# Patient Record
Sex: MALE | Race: WHITE | NOT HISPANIC OR LATINO | Employment: FULL TIME | RURAL
[De-identification: names, ages, dates, MRNs, and addresses within clinical notes are randomized per-mention and may not be internally consistent; named-entity substitution may affect disease eponyms.]

---

## 2017-08-09 ENCOUNTER — OFFICE VISIT (OUTPATIENT)
Dept: FAMILY MEDICINE CLINIC | Facility: CLINIC | Age: 27
End: 2017-08-09

## 2017-08-09 VITALS
SYSTOLIC BLOOD PRESSURE: 122 MMHG | TEMPERATURE: 97.5 F | HEART RATE: 74 BPM | HEIGHT: 71 IN | DIASTOLIC BLOOD PRESSURE: 82 MMHG | WEIGHT: 242 LBS | BODY MASS INDEX: 33.88 KG/M2

## 2017-08-09 DIAGNOSIS — H60.331 ACUTE SWIMMER'S EAR OF RIGHT SIDE: Primary | ICD-10-CM

## 2017-08-09 PROCEDURE — 99213 OFFICE O/P EST LOW 20 MIN: CPT | Performed by: NURSE PRACTITIONER

## 2017-08-09 NOTE — PROGRESS NOTES
"Subjective   Rupert Bean is a 27 y.o. male.   Chief Complaint   Patient presents with   • Earache     Earache    There is pain in the right ear. This is a new problem. The current episode started in the past 7 days. The problem has been waxing and waning. There has been no fever. Pertinent negatives include no abdominal pain, coughing, diarrhea, ear discharge, headaches, neck pain, rash, rhinorrhea, sore throat or vomiting. He has tried ear drops for the symptoms. The treatment provided no relief.        The following portions of the patient's history were reviewed and updated as appropriate: allergies, current medications, past family history, past medical history, past social history, past surgical history and problem list.  /82  Pulse 74  Temp 97.5 °F (36.4 °C) (Oral)   Ht 71\" (180.3 cm)  Wt 242 lb (110 kg)  BMI 33.75 kg/m2  Review of Systems   Constitutional: Negative for chills, fatigue and fever.   HENT: Positive for ear pain. Negative for congestion, ear discharge, rhinorrhea and sore throat.    Respiratory: Negative for cough, shortness of breath and wheezing.    Cardiovascular: Negative for chest pain, palpitations and leg swelling.   Gastrointestinal: Negative for abdominal pain, diarrhea, nausea and vomiting.   Genitourinary: Negative for dysuria.   Musculoskeletal: Negative for back pain, myalgias and neck pain.   Skin: Negative for rash and wound.   Neurological: Negative for dizziness, light-headedness and headaches.   Psychiatric/Behavioral: Negative for sleep disturbance. The patient is not nervous/anxious.        Objective   Physical Exam   Constitutional: He is oriented to person, place, and time. He appears well-developed and well-nourished.   HENT:   Head: Normocephalic and atraumatic.   Left Ear: External ear normal.   Right canal erythematous, edematous, TM NL   Cardiovascular: Normal rate, regular rhythm and normal heart sounds.    Pulmonary/Chest: Effort normal and breath " sounds normal.   Abdominal: Soft. Bowel sounds are normal.   Musculoskeletal: Normal range of motion.   Neurological: He is alert and oriented to person, place, and time.   Skin: Skin is warm and dry.   Psychiatric: He has a normal mood and affect. His behavior is normal.       Assessment/Plan   Rupert was seen today for earache.    Diagnoses and all orders for this visit:    Acute swimmer's ear of right side  -     neomycin-polymyxin-hydrocortisone (CORTISPORIN) 3.5-70856-0 otic solution; Administer 3 drops to the right ear 3 (Three) Times a Day.      Findings consistent with Otitis Externa. Will treat with topical antibiotic. Administration and SE discussed.  OTherwise keep the ear clean and dry. Follow up in 2-3 days if no improvement or if symptoms worsen.

## 2018-04-05 ENCOUNTER — OFFICE VISIT (OUTPATIENT)
Dept: FAMILY MEDICINE CLINIC | Facility: CLINIC | Age: 28
End: 2018-04-05

## 2018-04-05 VITALS
BODY MASS INDEX: 34.64 KG/M2 | HEART RATE: 78 BPM | WEIGHT: 247.4 LBS | TEMPERATURE: 97.8 F | HEIGHT: 71 IN | DIASTOLIC BLOOD PRESSURE: 67 MMHG | SYSTOLIC BLOOD PRESSURE: 123 MMHG

## 2018-04-05 DIAGNOSIS — N50.819 ORCHALGIA: Primary | ICD-10-CM

## 2018-04-05 PROCEDURE — 99213 OFFICE O/P EST LOW 20 MIN: CPT | Performed by: FAMILY MEDICINE

## 2018-04-05 NOTE — PROGRESS NOTES
"Subjective     Chief Complaint   Patient presents with   • Discuss male issues       Rupert Bean is a 27 y.o. male.     History of Present Illness a few weeks of episodic pelvic discomfort with perception of left testicle being smaller maybe a little tender.  Has had no urinary flow symptoms.  No bowel pattern symptoms.  No recent heavy lifting.  Interestingly did have an episode of scrotal trauma a few years ago.    The following portions of the patient's history were reviewed and updated as appropriate: allergies, past family history, past medical history, past social history, past surgical history and problem list.    Review of Systems see the history of present illness    Objective   Physical Exam   Constitutional: He is oriented to person, place, and time. He appears well-developed and well-nourished.   Abdominal: Soft. There is no tenderness. There is no guarding. Hernia confirmed negative in the right inguinal area and confirmed negative in the left inguinal area.   Genitourinary: Penis normal. Right testis shows no mass, no swelling and no tenderness. Left testis shows tenderness. Left testis shows no mass.   Genitourinary Comments: Left testicle is smaller than right testicle.  Less firm in consistency.   Lymphadenopathy: No inguinal adenopathy noted on the right or left side.   Neurological: He is alert and oriented to person, place, and time.   Psychiatric: He has a normal mood and affect.     /67 (BP Location: Left arm, Patient Position: Sitting, Cuff Size: Adult)   Pulse 78   Temp 97.8 °F (36.6 °C) (Oral)   Ht 180.3 cm (70.98\")   Wt 112 kg (247 lb 6.4 oz)   BMI 34.52 kg/m²   Assessment/Plan   Rupert was seen today for discuss male issues.    Diagnoses and all orders for this visit:    Orchalgia     I think you deserve a scrotal ultrasound.  Use OTC analgesia as desired.  You agree but cost is a factor.  Will try to sort out financial considerations and make you aware.  Follow-up " pending.

## 2018-04-06 DIAGNOSIS — N50.819 ORCHALGIA: Primary | ICD-10-CM

## 2018-04-24 ENCOUNTER — HOSPITAL ENCOUNTER (OUTPATIENT)
Dept: ULTRASOUND IMAGING | Facility: HOSPITAL | Age: 28
Discharge: HOME | End: 2018-04-24
Admitting: FAMILY MEDICINE

## 2018-04-24 DIAGNOSIS — N50.819 ORCHALGIA: ICD-10-CM

## 2018-04-24 PROCEDURE — 76870 US EXAM SCROTUM: CPT | Performed by: RADIOLOGY

## 2018-04-24 PROCEDURE — 76870 US EXAM SCROTUM: CPT

## 2018-04-26 NOTE — PROGRESS NOTES
Let him know that the ultrasound was unremarkable.  Did not show problems.  If he continues to have symptoms would recommend referral to urologist for check.

## 2018-09-14 ENCOUNTER — OFFICE VISIT (OUTPATIENT)
Dept: UROLOGY | Facility: CLINIC | Age: 28
End: 2018-09-14

## 2018-09-14 VITALS — HEIGHT: 71 IN | BODY MASS INDEX: 33.6 KG/M2 | WEIGHT: 240 LBS

## 2018-09-14 DIAGNOSIS — N50.819 TESTALGIA: ICD-10-CM

## 2018-09-14 DIAGNOSIS — N20.0 RENAL CALCULUS: Primary | ICD-10-CM

## 2018-09-14 PROCEDURE — 99204 OFFICE O/P NEW MOD 45 MIN: CPT | Performed by: UROLOGY

## 2018-09-14 NOTE — PROGRESS NOTES
Chief Complaint:          Chief Complaint   Patient presents with   • Testicle Pain       HPI:   28 y.o. male.  28-year-old white male is referred for evaluation of testicular pain left greater than right.  He had a normal testicular ultrasound which I personally reviewed.  It has  been off and on for 2 years.  He has pain in the groin.  He has normal erections and ejaculations.  He has no allergies, he's had wisdom teeth in his legs fixed from a fracture, he has a sit down job.  He has no other complaints or problems just a slight disparity of size of left smaller than right.  He's never had an upper tract study he's never had a nerve injury he has normal bowel movements he's been to urologist years ago and was diagnosed with BPH.  He has no stones is no history of significant trauma no history of infections and no sexually-transmitted diseases that he knows of.    Past Medical History:        Past Medical History:   Diagnosis Date   • Broken leg          Current Meds:     Current Outpatient Prescriptions   Medication Sig Dispense Refill   • Aspirin-Acetaminophen-Caffeine (EXCEDRIN PO) Take  by mouth.       No current facility-administered medications for this visit.         Allergies:      No Known Allergies     Past Surgical History:     Past Surgical History:   Procedure Laterality Date   • FRACTURE SURGERY           Social History:     Social History     Social History   • Marital status: Single     Spouse name: N/A   • Number of children: N/A   • Years of education: N/A     Occupational History   • Not on file.     Social History Main Topics   • Smoking status: Never Smoker   • Smokeless tobacco: Never Used   • Alcohol use 0.6 oz/week     1 Cans of beer per week   • Drug use: No   • Sexual activity: Not on file     Other Topics Concern   • Not on file     Social History Narrative   • No narrative on file       Family History:     Family History   Problem Relation Age of Onset   • Hypertension Mother    •  Anuerysm Father    • Diabetes Maternal Grandfather    • Heart disease Maternal Grandfather        Review of Systems:     Review of Systems   Constitutional: Negative.    HENT: Negative.    Eyes: Negative.    Respiratory: Negative.    Cardiovascular: Negative.    Gastrointestinal: Negative.    Endocrine: Negative.    Genitourinary: Positive for testicular pain.   Musculoskeletal: Negative.    Allergic/Immunologic: Negative.    Neurological: Negative.    Hematological: Negative.    Psychiatric/Behavioral: Negative.        Physical Exam:     Physical Exam   Constitutional: He is oriented to person, place, and time. He appears well-developed and well-nourished.   HENT:   Head: Normocephalic and atraumatic.   Eyes: Pupils are equal, round, and reactive to light. Conjunctivae and EOM are normal.   Neck: Normal range of motion.   Cardiovascular: Normal rate, regular rhythm, normal heart sounds and intact distal pulses.    Pulmonary/Chest: Effort normal and breath sounds normal.   Abdominal: Soft. Bowel sounds are normal.   Genitourinary: Penis normal.   Genitourinary Comments: Normal uncircumcised phallus normal testes left smaller than right no varicocele no epididymal cysts or masses.  Normal testis parenchyma   Musculoskeletal: Normal range of motion.   Neurological: He is alert and oriented to person, place, and time. He has normal reflexes.   Skin: Skin is warm and dry.   Psychiatric: He has a normal mood and affect. His behavior is normal. Judgment and thought content normal.   Nursing note and vitals reviewed.      I have reviewed the following portions of the patient's history: allergies, current medications, past family history, past medical history, past social history, past surgical history, problem list and ROS and confirm it's accurate.      Procedure:       Assessment/Plan:   Testalgia-he has a normal ultrasound.  This is very likely referred pain and to complete the workup I'm going to recommend a stone  study which may be part of the differential diagnosis if this is negative I'm do recommend observation and investigation of a probable lumbosacral nerve root irritation.     Patient's Body mass index is 33.49 kg/m². BMI is above normal parameters. Recommendations include: educational material.          This document has been electronically signed by NICKY KANG MD September 14, 2018 8:33 AM

## 2018-09-19 ENCOUNTER — APPOINTMENT (OUTPATIENT)
Dept: CT IMAGING | Facility: HOSPITAL | Age: 28
End: 2018-09-19
Attending: UROLOGY

## 2018-09-25 ENCOUNTER — OFFICE VISIT (OUTPATIENT)
Dept: RETAIL CLINIC | Facility: CLINIC | Age: 28
End: 2018-09-25

## 2018-09-25 VITALS
OXYGEN SATURATION: 99 % | RESPIRATION RATE: 18 BRPM | HEART RATE: 86 BPM | BODY MASS INDEX: 34.19 KG/M2 | WEIGHT: 245 LBS | DIASTOLIC BLOOD PRESSURE: 86 MMHG | SYSTOLIC BLOOD PRESSURE: 142 MMHG | TEMPERATURE: 98.3 F

## 2018-09-25 DIAGNOSIS — H66.93 OTITIS OF BOTH EARS: Primary | ICD-10-CM

## 2018-09-25 DIAGNOSIS — H60.92 OTITIS EXTERNA OF LEFT EAR, UNSPECIFIED CHRONICITY, UNSPECIFIED TYPE: ICD-10-CM

## 2018-09-25 PROCEDURE — 99213 OFFICE O/P EST LOW 20 MIN: CPT | Performed by: NURSE PRACTITIONER

## 2018-09-25 RX ORDER — AMOXICILLIN 875 MG/1
875 TABLET, COATED ORAL 2 TIMES DAILY
Qty: 20 TABLET | Refills: 0 | Status: SHIPPED | OUTPATIENT
Start: 2018-09-25 | End: 2018-10-05

## 2018-09-25 RX ORDER — CIPROFLOXACIN HYDROCHLORIDE 3.5 MG/ML
4 SOLUTION/ DROPS TOPICAL 2 TIMES DAILY
Qty: 10 ML | Refills: 0 | Status: SHIPPED | OUTPATIENT
Start: 2018-09-25 | End: 2018-09-28

## 2018-09-25 NOTE — PATIENT INSTRUCTIONS
"Otitis Externa  Otitis externa is an infection of the outer ear canal. The outer ear canal is the area between the outside of the ear and the eardrum. Otitis externa is sometimes called \"swimmer's ear.\"  Follow these instructions at home:  · If you were given antibiotic ear drops, use them as told by your doctor. Do not stop using them even if your condition gets better.  · Take over-the-counter and prescription medicines only as told by your doctor.  · Keep all follow-up visits as told by your doctor. This is important.  How is this prevented?  · Keep your ear dry. Use the corner of a towel to dry your ear after you swim or bathe.  · Try not to scratch or put things in your ear. Doing these things makes it easier for germs to grow in your ear.  · Avoid swimming in lakes, dirty water, or pools that may not have the right amount of a chemical called chlorine.  · Consider making ear drops and putting 3 or 4 drops in each ear after you swim. Ask your doctor about how you can make ear drops.  Contact a doctor if:  · You have a fever.  · After 3 days your ear is still red, swollen, or painful.  · After 3 days you still have pus coming from your ear.  · Your redness, swelling, or pain gets worse.  · You have a really bad headache.  · You have redness, swelling, pain, or tenderness behind your ear.  This information is not intended to replace advice given to you by your health care provider. Make sure you discuss any questions you have with your health care provider.  Document Released: 06/05/2009 Document Revised: 01/12/2017 Document Reviewed: 09/26/2016  [employer] Interactive Patient Education © 2018 [employer]    Otitis Media, Adult  Otitis media is redness, soreness, and puffiness (swelling) in the space just behind your eardrum (middle ear). It may be caused by allergies or infection. It often happens along with a cold.  Follow these instructions at home:  · Take your medicine as told. Finish it even if you start to " feel better.  · Only take over-the-counter or prescription medicines for pain, discomfort, or fever as told by your doctor.  · Follow up with your doctor as told.  Contact a doctor if:  · You have otitis media only in one ear, or bleeding from your nose, or both.  · You notice a lump on your neck.  · You are not getting better in 3-5 days.  · You feel worse instead of better.  Get help right away if:  · You have pain that is not helped with medicine.  · You have puffiness, redness, or pain around your ear.  · You get a stiff neck.  · You cannot move part of your face (paralysis).  · You notice that the bone behind your ear hurts when you touch it.  This information is not intended to replace advice given to you by your health care provider. Make sure you discuss any questions you have with your health care provider.  Document Released: 06/05/2009 Document Revised: 05/25/2017 Document Reviewed: 07/15/2014  [employer] Interactive Patient Education © 2017 [employer]

## 2018-09-25 NOTE — PROGRESS NOTES
Subjective   Rupert Bean is a 28 y.o. male.   Chief Complaint   Patient presents with   • Earache      Earache    There is pain in both ears. This is a new problem. Episode onset: two days. The problem occurs constantly. The problem has been gradually worsening. There has been no fever. The pain is at a severity of 8/10. The pain is severe. Associated symptoms include ear discharge. He has tried ear drops and NSAIDs for the symptoms. The treatment provided mild relief.        The following portions of the patient's history were reviewed and updated as appropriate: allergies, current medications, past family history, past medical history, past social history, past surgical history and problem list.    Review of Systems   Constitutional: Negative.    HENT: Positive for ear discharge and ear pain.    Eyes: Negative.    Respiratory: Negative.    Gastrointestinal: Negative.    Genitourinary: Negative.    Skin: Negative.    Allergic/Immunologic: Negative.    Psychiatric/Behavioral: Negative.    All other systems reviewed and are negative.      Objective   No Known Allergies    Physical Exam   Constitutional: He is oriented to person, place, and time. He appears well-developed and well-nourished.   HENT:   Right Ear: Tympanic membrane is injected and erythematous.   Left Ear: There is drainage, swelling and tenderness. Tympanic membrane is injected and erythematous.   Nose: Nose normal.   Mouth/Throat: Oropharynx is clear and moist.   Lt EAC macerated and edematous, tragal tenderness   Eyes: Pupils are equal, round, and reactive to light.   Neck: Neck supple.   Cardiovascular: Normal rate and regular rhythm.    Pulmonary/Chest: Effort normal and breath sounds normal.   Lymphadenopathy:     He has no cervical adenopathy.   Neurological: He is alert and oriented to person, place, and time.   Skin: Skin is warm and dry. No rash noted.   Psychiatric: He has a normal mood and affect.   Vitals reviewed.      Assessment/Plan    Rupert was seen today for earache.    Diagnoses and all orders for this visit:    Otitis of both ears    Otitis externa of left ear, unspecified chronicity, unspecified type    Other orders  -     amoxicillin (AMOXIL) 875 MG tablet; Take 1 tablet by mouth 2 (Two) Times a Day for 10 days.  -     ciprofloxacin (CILOXAN) 0.3 % ophthalmic solution; Administer 4 drops into ear(s) as directed by provider 2 (Two) Times a Day for 10 days.                 This document has been electronically signed by MARY Puente September 25, 2018 2:45 PM

## 2018-09-27 ENCOUNTER — HOSPITAL ENCOUNTER (OUTPATIENT)
Dept: CT IMAGING | Facility: HOSPITAL | Age: 28
Discharge: HOME | End: 2018-09-27
Attending: UROLOGY | Admitting: UROLOGY

## 2018-09-27 DIAGNOSIS — N20.0 RENAL CALCULUS: ICD-10-CM

## 2018-09-27 PROCEDURE — 74176 CT ABD & PELVIS W/O CONTRAST: CPT

## 2018-09-27 PROCEDURE — 74176 CT ABD & PELVIS W/O CONTRAST: CPT | Performed by: RADIOLOGY

## 2018-09-28 ENCOUNTER — OFFICE VISIT (OUTPATIENT)
Dept: UROLOGY | Facility: CLINIC | Age: 28
End: 2018-09-28

## 2018-09-28 VITALS — WEIGHT: 245 LBS | HEIGHT: 71 IN | BODY MASS INDEX: 34.3 KG/M2

## 2018-09-28 DIAGNOSIS — N50.819 TESTALGIA: Primary | ICD-10-CM

## 2018-09-28 PROCEDURE — 99213 OFFICE O/P EST LOW 20 MIN: CPT | Performed by: UROLOGY

## 2018-09-28 NOTE — PROGRESS NOTES
Chief Complaint:          Chief Complaint   Patient presents with   • Testalgia     f/u        HPI:   28 y.o. male.  28-year-old white male with testalgia the CT and scrotal US was negative  I believe it's lumbosacral testicular irritation I discussed stretching exercises    Past Medical History:        Past Medical History:   Diagnosis Date   • Broken leg    • Bronchitis    • History of ear infections          Current Meds:     Current Outpatient Prescriptions   Medication Sig Dispense Refill   • amoxicillin (AMOXIL) 875 MG tablet Take 1 tablet by mouth 2 (Two) Times a Day for 10 days. 20 tablet 0   • ciprofloxacin (CILOXAN) 0.3 % ophthalmic solution Administer 4 drops into ear(s) as directed by provider 2 (Two) Times a Day for 10 days. 10 mL 0     No current facility-administered medications for this visit.         Allergies:      No Known Allergies     Past Surgical History:     Past Surgical History:   Procedure Laterality Date   • FRACTURE SURGERY Right 1994         Social History:     Social History     Social History   • Marital status: Single     Spouse name: N/A   • Number of children: N/A   • Years of education: N/A     Occupational History   • Not on file.     Social History Main Topics   • Smoking status: Never Smoker   • Smokeless tobacco: Never Used   • Alcohol use 0.6 oz/week     1 Cans of beer per week   • Drug use: No   • Sexual activity: Defer     Other Topics Concern   • Not on file     Social History Narrative   • No narrative on file       Family History:     Family History   Problem Relation Age of Onset   • Hypertension Mother    • Anuerysm Father    • Diabetes Maternal Grandfather    • Heart disease Maternal Grandfather        Review of Systems:     Review of Systems   Constitutional: Negative.    HENT: Negative.    Eyes: Negative.    Respiratory: Negative.    Cardiovascular: Negative.    Gastrointestinal: Negative.    Endocrine: Negative.    Musculoskeletal: Negative.     Allergic/Immunologic: Negative.    Neurological: Negative.    Hematological: Negative.    Psychiatric/Behavioral: Negative.        Physical Exam:     Physical Exam   Constitutional: He is oriented to person, place, and time. He appears well-developed and well-nourished.   HENT:   Head: Normocephalic and atraumatic.   Eyes: Pupils are equal, round, and reactive to light. Conjunctivae and EOM are normal.   Neck: Normal range of motion.   Cardiovascular: Normal rate, regular rhythm, normal heart sounds and intact distal pulses.    Pulmonary/Chest: Effort normal and breath sounds normal.   Abdominal: Soft. Bowel sounds are normal.   Musculoskeletal: Normal range of motion.   Neurological: He is alert and oriented to person, place, and time. He has normal reflexes.   Skin: Skin is warm and dry.   Psychiatric: He has a normal mood and affect. His behavior is normal. Judgment and thought content normal.   Nursing note and vitals reviewed.      I have reviewed the following portions of the patient's history: allergies, current medications, past family history, past medical history, past social history, past surgical history, problem list and ROS and confirm it's accurate.      Procedure:       Assessment/Plan:   Testalgia-is likely referred pain     Patient's Body mass index is 34.19 kg/m². BMI is above normal parameters. Recommendations include: educational material.          This document has been electronically signed by NICKY KANG MD September 28, 2018 8:15 AM

## 2019-02-11 ENCOUNTER — OFFICE VISIT (OUTPATIENT)
Dept: FAMILY MEDICINE CLINIC | Facility: CLINIC | Age: 29
End: 2019-02-11

## 2019-02-11 VITALS
TEMPERATURE: 97.6 F | BODY MASS INDEX: 33.46 KG/M2 | SYSTOLIC BLOOD PRESSURE: 119 MMHG | WEIGHT: 239 LBS | HEIGHT: 71 IN | DIASTOLIC BLOOD PRESSURE: 69 MMHG | HEART RATE: 76 BPM

## 2019-02-11 DIAGNOSIS — Z83.3 FAMILY HISTORY OF DIABETES MELLITUS (DM): ICD-10-CM

## 2019-02-11 DIAGNOSIS — Z13.220 SCREENING FOR HYPERLIPIDEMIA: ICD-10-CM

## 2019-02-11 DIAGNOSIS — R53.83 FATIGUE, UNSPECIFIED TYPE: Primary | ICD-10-CM

## 2019-02-11 DIAGNOSIS — L98.9 SKIN LESION OF FACE: ICD-10-CM

## 2019-02-11 LAB
25(OH)D3 SERPL-MCNC: 17 NG/ML
ALBUMIN SERPL-MCNC: 4.6 G/DL (ref 3.5–5)
ALBUMIN/GLOB SERPL: 1.4 G/DL (ref 1.5–2.5)
ALP SERPL-CCNC: 80 U/L (ref 40–129)
ALT SERPL W P-5'-P-CCNC: 30 U/L (ref 10–44)
ANION GAP SERPL CALCULATED.3IONS-SCNC: 3.8 MMOL/L (ref 3.6–11.2)
AST SERPL-CCNC: 21 U/L (ref 10–34)
BASOPHILS # BLD AUTO: 0.04 10*3/MM3 (ref 0–0.3)
BASOPHILS NFR BLD AUTO: 0.5 % (ref 0–2)
BILIRUB SERPL-MCNC: 0.4 MG/DL (ref 0.2–1.8)
BUN BLD-MCNC: 14 MG/DL (ref 7–21)
BUN/CREAT SERPL: 14.1 (ref 7–25)
CALCIUM SPEC-SCNC: 9.3 MG/DL (ref 7.7–10)
CHLORIDE SERPL-SCNC: 107 MMOL/L (ref 99–112)
CHOLEST SERPL-MCNC: 158 MG/DL (ref 0–200)
CO2 SERPL-SCNC: 27.2 MMOL/L (ref 24.3–31.9)
CREAT BLD-MCNC: 0.99 MG/DL (ref 0.43–1.29)
DEPRECATED RDW RBC AUTO: 38.6 FL (ref 37–54)
EOSINOPHIL # BLD AUTO: 0.26 10*3/MM3 (ref 0–0.7)
EOSINOPHIL NFR BLD AUTO: 3.1 % (ref 0–5)
ERYTHROCYTE [DISTWIDTH] IN BLOOD BY AUTOMATED COUNT: 12.6 % (ref 11.5–14.5)
GFR SERPL CREATININE-BSD FRML MDRD: 91 ML/MIN/1.73
GLOBULIN UR ELPH-MCNC: 3.4 GM/DL
GLUCOSE BLD-MCNC: 90 MG/DL (ref 70–110)
HBA1C MFR BLD: 5.7 % (ref 4.5–5.7)
HCT VFR BLD AUTO: 43 % (ref 42–52)
HDLC SERPL-MCNC: 35 MG/DL (ref 60–100)
HGB BLD-MCNC: 14.3 G/DL (ref 14–18)
IMM GRANULOCYTES # BLD AUTO: 0.02 10*3/MM3 (ref 0–0.03)
IMM GRANULOCYTES NFR BLD AUTO: 0.2 % (ref 0–0.5)
LDLC SERPL CALC-MCNC: 85 MG/DL (ref 0–100)
LDLC/HDLC SERPL: 2.42 {RATIO}
LYMPHOCYTES # BLD AUTO: 1.89 10*3/MM3 (ref 1–3)
LYMPHOCYTES NFR BLD AUTO: 22.7 % (ref 21–51)
MCH RBC QN AUTO: 28.1 PG (ref 27–33)
MCHC RBC AUTO-ENTMCNC: 33.3 G/DL (ref 33–37)
MCV RBC AUTO: 84.5 FL (ref 80–94)
MONOCYTES # BLD AUTO: 0.69 10*3/MM3 (ref 0.1–0.9)
MONOCYTES NFR BLD AUTO: 8.3 % (ref 0–10)
NEUTROPHILS # BLD AUTO: 5.43 10*3/MM3 (ref 1.4–6.5)
NEUTROPHILS NFR BLD AUTO: 65.2 % (ref 30–70)
OSMOLALITY SERPL CALC.SUM OF ELEC: 275.7 MOSM/KG (ref 273–305)
PLATELET # BLD AUTO: 391 10*3/MM3 (ref 130–400)
PMV BLD AUTO: 9 FL (ref 6–10)
POTASSIUM BLD-SCNC: 4 MMOL/L (ref 3.5–5.3)
PROT SERPL-MCNC: 8 G/DL (ref 6–8)
RBC # BLD AUTO: 5.09 10*6/MM3 (ref 4.7–6.1)
SODIUM BLD-SCNC: 138 MMOL/L (ref 135–153)
TRIGL SERPL-MCNC: 192 MG/DL (ref 0–150)
TSH SERPL DL<=0.05 MIU/L-ACNC: 2.1 MIU/ML (ref 0.55–4.78)
VIT B12 BLD-MCNC: 423 PG/ML (ref 211–911)
VLDLC SERPL-MCNC: 38.4 MG/DL
WBC NRBC COR # BLD: 8.33 10*3/MM3 (ref 4.5–12.5)

## 2019-02-11 PROCEDURE — 36415 COLL VENOUS BLD VENIPUNCTURE: CPT | Performed by: NURSE PRACTITIONER

## 2019-02-11 PROCEDURE — 83036 HEMOGLOBIN GLYCOSYLATED A1C: CPT | Performed by: NURSE PRACTITIONER

## 2019-02-11 PROCEDURE — 84443 ASSAY THYROID STIM HORMONE: CPT | Performed by: NURSE PRACTITIONER

## 2019-02-11 PROCEDURE — 80053 COMPREHEN METABOLIC PANEL: CPT | Performed by: NURSE PRACTITIONER

## 2019-02-11 PROCEDURE — 80061 LIPID PANEL: CPT | Performed by: NURSE PRACTITIONER

## 2019-02-11 PROCEDURE — 99214 OFFICE O/P EST MOD 30 MIN: CPT | Performed by: NURSE PRACTITIONER

## 2019-02-11 PROCEDURE — 82306 VITAMIN D 25 HYDROXY: CPT | Performed by: NURSE PRACTITIONER

## 2019-02-11 PROCEDURE — 85025 COMPLETE CBC W/AUTO DIFF WBC: CPT | Performed by: NURSE PRACTITIONER

## 2019-02-11 PROCEDURE — 82607 VITAMIN B-12: CPT | Performed by: NURSE PRACTITIONER

## 2019-02-11 RX ORDER — NYSTATIN AND TRIAMCINOLONE ACETONIDE 100000; 1 [USP'U]/G; MG/G
OINTMENT TOPICAL 2 TIMES DAILY
Qty: 1 G | Refills: 0 | Status: SHIPPED | OUTPATIENT
Start: 2019-02-11 | End: 2020-01-24

## 2019-02-11 NOTE — PROGRESS NOTES
Subjective   Rupert Bean is a 28 y.o. male.     Patient is presenting today with some new onset symptoms.  He has report of some increased fatigue over the last year. His reporting concerns due to family history of diabetes.  He reports Eats bad diet with fast foods, greasy foods.  Drinks soda sometimes.  On the road and works a lot.  Busy life causes him not to be able to adhere to healthier lifestyle.  He denies any hypoglycemic episodes, however does become irritable and anxious at times.  He has history of anxiety, however is never taken any medication for this  And will exercise or go for run when he feels anxious..  He is also reporting Some blurred vision over the last several months.  Not seen opthalmology. He will schedule.  Also having lesion to right nare for several months.  Has treated with topical ointments and area persists.  Denies any drainage from site or pain or itching.  He has no other complaints today.         The following portions of the patient's history were reviewed and updated as appropriate: allergies, current medications, past family history, past medical history, past surgical history and problem list.    Review of Systems   Constitutional: Positive for fatigue.   HENT: Negative.    Eyes: Positive for visual disturbance (blurring vision ).   Respiratory: Negative.    Cardiovascular: Negative.    Musculoskeletal: Negative.    Skin: Positive for rash (right nostril ).   Allergic/Immunologic: Negative.    Neurological: Negative.    Hematological: Negative.    Psychiatric/Behavioral: The patient is nervous/anxious.        Objective   Physical Exam   Constitutional: He appears well-developed. No distress.   HENT:   Head: Normocephalic and atraumatic.   Eyes: Conjunctivae and EOM are normal.   Neck: Neck supple. No JVD present.   Cardiovascular: Normal rate and normal heart sounds.   Pulmonary/Chest: Effort normal and breath sounds normal. No respiratory distress.   Abdominal: Soft.  Bowel sounds are normal. He exhibits no distension.   Musculoskeletal: Normal range of motion. He exhibits no edema.   Neurological: He is alert.   Skin: Skin is warm. No rash noted.   Psychiatric: He has a normal mood and affect.   Vitals reviewed.      Assessment/Plan   Rupert was seen today for fatigue, blurred vision and skin lesion.  Discussed patient's medical history and complaints at length today.  We will order fasting labs today and also evaluate further complaints of fatigue.  Discussed history of anxiety.  We will await blood work results to discuss any further treatment.  He does not wish to start any medications at this point.  We will order Mycolog ointment for lesion to his nose.  He takes no other medications.  We will follow-up as needed based on lab results.  Instructed on importance of attempting to maintain healthy diet and exercise activity despite lifestyle and schedule.  Discussed importance of taking care of self.  Discussed that he will need routine eye exam related to his blurred vision complaints.  he will schedule  He verbalizes understanding.  Diagnoses and all orders for this visit:    Fatigue, unspecified type  -     CBC & Differential  -     Comprehensive Metabolic Panel  -     TSH  -     Vitamin D 25 Hydroxy  -     Vitamin B12  -     CBC Auto Differential    Family history of diabetes mellitus (DM)  -     CBC & Differential  -     Comprehensive Metabolic Panel  -     Hemoglobin A1c  -     CBC Auto Differential    Screening for hyperlipidemia  -     CBC & Differential  -     Comprehensive Metabolic Panel  -     Lipid Panel  -     CBC Auto Differential    Skin lesion of face    Other orders  -     nystatin-triamcinolone (MYCOLOG) 880037-3.1 UNIT/GM-% ointment; Apply  topically to the appropriate area as directed 2 (Two) Times a Day.        Patient's Body mass index is 33.35 kg/m². BMI is above normal parameters. Recommendations include: exercise counseling and nutrition  counseling.

## 2019-02-12 RX ORDER — ERGOCALCIFEROL 1.25 MG/1
50000 CAPSULE ORAL WEEKLY
Qty: 4 CAPSULE | Refills: 6 | Status: SHIPPED | OUTPATIENT
Start: 2019-02-12 | End: 2021-09-02

## 2019-10-04 ENCOUNTER — OFFICE VISIT (OUTPATIENT)
Dept: FAMILY MEDICINE CLINIC | Facility: CLINIC | Age: 29
End: 2019-10-04

## 2019-10-04 VITALS
BODY MASS INDEX: 35.48 KG/M2 | DIASTOLIC BLOOD PRESSURE: 88 MMHG | HEART RATE: 93 BPM | TEMPERATURE: 98 F | HEIGHT: 71 IN | SYSTOLIC BLOOD PRESSURE: 142 MMHG | WEIGHT: 253.4 LBS | OXYGEN SATURATION: 98 %

## 2019-10-04 DIAGNOSIS — N39.0 URINARY TRACT INFECTION WITHOUT HEMATURIA, SITE UNSPECIFIED: Primary | ICD-10-CM

## 2019-10-04 DIAGNOSIS — N41.0 ACUTE PROSTATITIS: ICD-10-CM

## 2019-10-04 LAB
BILIRUB BLD-MCNC: NEGATIVE MG/DL
CLARITY, POC: CLEAR
COLOR UR: ABNORMAL
GLUCOSE UR STRIP-MCNC: NEGATIVE MG/DL
KETONES UR QL: NEGATIVE
LEUKOCYTE EST, POC: NEGATIVE
NITRITE UR-MCNC: NEGATIVE MG/ML
PH UR: 6.5 [PH] (ref 5–8)
PROT UR STRIP-MCNC: ABNORMAL MG/DL
RBC # UR STRIP: NEGATIVE /UL
SP GR UR: 1.01 (ref 1–1.03)
UROBILINOGEN UR QL: NORMAL

## 2019-10-04 PROCEDURE — 81002 URINALYSIS NONAUTO W/O SCOPE: CPT | Performed by: NURSE PRACTITIONER

## 2019-10-04 PROCEDURE — 99214 OFFICE O/P EST MOD 30 MIN: CPT | Performed by: NURSE PRACTITIONER

## 2019-10-04 RX ORDER — SULFAMETHOXAZOLE AND TRIMETHOPRIM 800; 160 MG/1; MG/1
1 TABLET ORAL 2 TIMES DAILY
Qty: 20 TABLET | Refills: 0 | Status: SHIPPED | OUTPATIENT
Start: 2019-10-04 | End: 2020-01-24

## 2019-10-04 NOTE — PROGRESS NOTES
Subjective   Rupert Bean is a 29 y.o. male.     Chief Complaint   Patient presents with   • Urinary Tract Infection   • Testicle Pain       He presents with c/o dysuria for the past week or so. He c/o clear discharge. He states it feels like he has to push to pee a little bit. He c/o testicular pain mostly on the right in the mornings when he gets up to take a shower. He states he has not taken any medicine. He states he tried to drink some cranberry juice and he drinks a lot of water. He states he has a history of prostate problems. He states he sits all day at work and isn't very active when he gets home. He reports he has a poor diet. He denies fever and chills.     He also presents with c/o rash on his left arm for a couple weeks. He denies itching, pain, and drainage. He states they just stay there and stay red. He states it started at his dad's house over the summer and they use a different detergent.          The following portions of the patient's history were reviewed and updated as appropriate: allergies, current medications, past family history, past medical history, past social history, past surgical history and problem list.    Review of Systems   Constitutional: Negative for fever and unexpected weight change.   HENT: Negative for ear pain, rhinorrhea, sore throat and trouble swallowing.    Eyes: Negative for visual disturbance.   Respiratory: Negative for cough, shortness of breath and wheezing.    Cardiovascular: Negative for chest pain and palpitations.   Gastrointestinal: Positive for abdominal pain (right lower quadrant). Negative for blood in stool, constipation, diarrhea, nausea and vomiting.   Endocrine: Negative for polydipsia, polyphagia and polyuria.   Genitourinary: Positive for difficulty urinating, discharge, dysuria and testicular pain. Negative for penile swelling and scrotal swelling.   Musculoskeletal: Negative for arthralgias and myalgias.   Skin: Positive for rash. Negative for  "color change.   Allergic/Immunologic: Negative for environmental allergies.   Neurological: Negative for dizziness, seizures, syncope and headaches.   Hematological: Negative for adenopathy.   Psychiatric/Behavioral: Negative for sleep disturbance and suicidal ideas. The patient is not nervous/anxious.        Objective     /88 (BP Location: Left arm, Patient Position: Sitting, Cuff Size: Adult)   Pulse 93   Temp 98 °F (36.7 °C) (Oral)   Ht 180.3 cm (70.98\")   Wt 115 kg (253 lb 6.4 oz)   SpO2 98%   BMI 35.36 kg/m²     Physical Exam   Constitutional: He is oriented to person, place, and time. He appears well-developed and well-nourished. No distress.   HENT:   Head: Normocephalic and atraumatic.   Cardiovascular: Normal rate, regular rhythm, normal heart sounds and intact distal pulses. Exam reveals no gallop and no friction rub.   No murmur heard.  Pulmonary/Chest: Effort normal and breath sounds normal. No respiratory distress.   Abdominal: Soft. Bowel sounds are normal. He exhibits no distension. There is no tenderness.   Genitourinary: Testes normal and penis normal. Cremasteric reflex is present.   Genitourinary Comments: Small amount clear discharge noted. Discharge cultured.    Neurological: He is alert and oriented to person, place, and time.   Skin: Skin is warm and dry. Rash noted. Rash is papular. He is not diaphoretic.        Erythematous papular rash left fore arm. Scattered.    Psychiatric: He has a normal mood and affect. His behavior is normal. Judgment and thought content normal.   Vitals reviewed.      Assessment/Plan     Problem List Items Addressed This Visit     None      Visit Diagnoses     Urinary tract infection without hematuria, site unspecified    -  Primary    Relevant Medications    sulfamethoxazole-trimethoprim (BACTRIM DS) 800-160 MG per tablet    Other Relevant Orders    POCT urinalysis dipstick, manual (Completed)    Acute prostatitis              Plan: Urinalysis has a " trace of protein, otherwise is normal. Get genital culture. Bactrim ordered for suspected prostatitis. Drink lots of water. Follow up if no improvement.     Patient's Body mass index is 35.36 kg/m². BMI is above normal parameters. Recommendations include: educational material.   (Normal BMI:  18.5-24.9, OW 25-29.9, Obesity 30 or greater)        Understands disease processes and need for medications.  Understands reasons for urgent and emergent care.  Patient (& family) verbalized agreement for treatment plan.   Emotional support and active listening provided.  Patient provided time to verbalize feelings.               This document has been electronically signed by MARY Rivera   October 4, 2019 10:37 AM

## 2019-10-04 NOTE — PATIENT INSTRUCTIONS

## 2019-10-08 LAB
BACTERIA GENITAL AEROBE CULT: NORMAL
BACTERIA SPEC CULT: NORMAL

## 2020-01-10 ENCOUNTER — OFFICE VISIT (OUTPATIENT)
Dept: UROLOGY | Facility: CLINIC | Age: 30
End: 2020-01-10

## 2020-01-10 DIAGNOSIS — N50.819 TESTALGIA: Primary | ICD-10-CM

## 2020-01-10 PROCEDURE — 99212 OFFICE O/P EST SF 10 MIN: CPT | Performed by: UROLOGY

## 2020-01-10 NOTE — PROGRESS NOTES
Chief Complaint:       Testalgia    HPI:   29 y.o. male returns today with known testalgia he had a CT and a scrotal ultrasound.  I thought it was lumbar his urine is negative he feels great I will see him back on an as-needed basis    Past Medical History:        Past Medical History:   Diagnosis Date   • Broken leg    • Bronchitis    • History of ear infections          Current Meds:     Current Outpatient Medications   Medication Sig Dispense Refill   • nystatin-triamcinolone (MYCOLOG) 588010-7.1 UNIT/GM-% ointment Apply  topically to the appropriate area as directed 2 (Two) Times a Day. 1 g 0   • sulfamethoxazole-trimethoprim (BACTRIM DS) 800-160 MG per tablet Take 1 tablet by mouth 2 (Two) Times a Day. 20 tablet 0   • vitamin D (ERGOCALCIFEROL) 79025 units capsule capsule Take 1 capsule by mouth 1 (One) Time Per Week. 4 capsule 6     No current facility-administered medications for this visit.         Allergies:      No Known Allergies     Past Surgical History:     Past Surgical History:   Procedure Laterality Date   • FRACTURE SURGERY Right 1994         Social History:     Social History     Socioeconomic History   • Marital status: Single     Spouse name: Not on file   • Number of children: Not on file   • Years of education: Not on file   • Highest education level: Not on file   Tobacco Use   • Smoking status: Never Smoker   • Smokeless tobacco: Never Used   Substance and Sexual Activity   • Alcohol use: Yes     Alcohol/week: 1.0 standard drinks     Types: 1 Cans of beer per week   • Drug use: No   • Sexual activity: Defer       Family History:     Family History   Problem Relation Age of Onset   • Hypertension Mother    • Anuerysm Father    • Diabetes Maternal Grandfather    • Heart disease Maternal Grandfather        Review of Systems:     Review of Systems   Constitutional: Negative.  Negative for chills, fatigue and fever.   HENT: Negative.  Negative for congestion and sinus pressure.    Eyes: Negative.     Respiratory: Negative.  Negative for chest tightness and shortness of breath.    Cardiovascular: Negative.  Negative for chest pain.   Gastrointestinal: Negative.  Negative for abdominal pain, constipation, diarrhea, nausea and vomiting.   Endocrine: Negative.    Genitourinary: Positive for testicular pain. Negative for flank pain, frequency, hematuria and urgency.   Musculoskeletal: Negative.  Negative for back pain and neck pain.   Skin: Negative for rash.   Allergic/Immunologic: Negative.    Neurological: Negative.  Negative for dizziness and headaches.   Hematological: Negative.  Does not bruise/bleed easily.   Psychiatric/Behavioral: Negative.  The patient is not nervous/anxious.        Physical Exam:     Physical Exam   Constitutional: He is oriented to person, place, and time. He appears well-developed and well-nourished.   HENT:   Head: Normocephalic and atraumatic.   Eyes: Pupils are equal, round, and reactive to light. Conjunctivae and EOM are normal.   Neck: Normal range of motion.   Cardiovascular: Normal rate, regular rhythm, normal heart sounds and intact distal pulses.   Pulmonary/Chest: Effort normal and breath sounds normal.   Abdominal: Soft. Bowel sounds are normal.   Genitourinary: Penis normal.   Musculoskeletal: Normal range of motion.   Neurological: He is alert and oriented to person, place, and time. He has normal reflexes.   Skin: Skin is warm and dry.   Psychiatric: He has a normal mood and affect. His behavior is normal. Judgment and thought content normal.   Nursing note and vitals reviewed.      I have reviewed the following portions of the patient's history: allergies, current medications, past family history, past medical history, past social history, past surgical history, problem list and ROS and confirm it's accurate.      Procedure:       Assessment/Plan:   Testalgia- resolved we will see back on an as needed basis            Patient's There is no height or weight on file to  calculate BMI. BMI is above normal parameters. Recommendations include: educational material.              This document has been electronically signed by NICKY KANG MD January 10, 2020 1:04 PM

## 2020-01-24 ENCOUNTER — TELEPHONE (OUTPATIENT)
Dept: FAMILY MEDICINE CLINIC | Facility: CLINIC | Age: 30
End: 2020-01-24

## 2020-01-24 ENCOUNTER — OFFICE VISIT (OUTPATIENT)
Dept: FAMILY MEDICINE CLINIC | Facility: CLINIC | Age: 30
End: 2020-01-24

## 2020-01-24 VITALS
TEMPERATURE: 97.1 F | OXYGEN SATURATION: 97 % | HEIGHT: 71 IN | HEART RATE: 92 BPM | SYSTOLIC BLOOD PRESSURE: 122 MMHG | DIASTOLIC BLOOD PRESSURE: 84 MMHG | BODY MASS INDEX: 36.06 KG/M2 | WEIGHT: 257.6 LBS

## 2020-01-24 DIAGNOSIS — R29.818 SUSPECTED SLEEP APNEA: Primary | ICD-10-CM

## 2020-01-24 DIAGNOSIS — F51.3 SLEEP WALKING: ICD-10-CM

## 2020-01-24 PROCEDURE — 99213 OFFICE O/P EST LOW 20 MIN: CPT | Performed by: NURSE PRACTITIONER

## 2020-01-24 NOTE — PROGRESS NOTES
Subjective   Rupert Bean is a 29 y.o. male.     Chief Complaint   Patient presents with   • Sleeping Problem       He presents with c/o sleep walking for the past couple weeks. He states last Saturday he was sleep walking and fell on a sewing table and cut his left arm. He states it happened again Monday. He states he woke up and was sitting in the floor and his head was laying on the chair. He states it has happened in the past. He states sometimes he wakes up trying to scream and can't. He states it is usually a night terror. He doesn't know if it is triggered by stress. He states it happened in 2014. He states he will be asleep and wake up trying to holler. He states he can't really remember what he is dreaming about. He states he has been told he snores. He states he is sleepy during the day. He states he has been sleeping better since taking magnesium and tea. He states he usually goes to bed at 1am and gets up at 6, so typically he does not get a lot of sleep. He states he tries to take a nap when he gets home from work around 5:30. He states he was drinking a lot of coffee throughout the day and he has cut that back. He states he has light palpitations when he drinks alcohol that he has always kind of had, so he has cut back on alcohol.        The following portions of the patient's history were reviewed and updated as appropriate: allergies, current medications, past family history, past medical history, past social history, past surgical history and problem list.    Review of Systems   Constitutional: Positive for fatigue. Negative for fever and unexpected weight change.   HENT: Negative for ear pain, rhinorrhea, sore throat and trouble swallowing.    Eyes: Negative for visual disturbance.   Respiratory: Negative for cough, shortness of breath and wheezing.    Cardiovascular: Positive for palpitations. Negative for chest pain.   Gastrointestinal: Negative for abdominal pain, blood in stool,  "constipation, diarrhea, nausea and vomiting.   Genitourinary: Negative for dysuria and hematuria.   Musculoskeletal: Positive for arthralgias. Negative for myalgias.   Skin: Negative for color change.   Allergic/Immunologic: Negative for environmental allergies.   Neurological: Negative for dizziness, seizures, syncope and headaches.   Hematological: Negative for adenopathy.   Psychiatric/Behavioral: Positive for sleep disturbance. Negative for suicidal ideas. The patient is nervous/anxious.        Objective     /84 (BP Location: Right arm, Patient Position: Sitting, Cuff Size: Adult)   Pulse 92   Temp 97.1 °F (36.2 °C) (Oral)   Ht 179.1 cm (70.5\")   Wt 117 kg (257 lb 9.6 oz)   SpO2 97%   BMI 36.44 kg/m²     Physical Exam   Constitutional: He is oriented to person, place, and time. Vital signs are normal. He appears well-developed and well-nourished. No distress.   HENT:   Head: Normocephalic and atraumatic.   Cardiovascular: Normal rate, regular rhythm, normal heart sounds and intact distal pulses. Exam reveals no gallop and no friction rub.   No murmur heard.  Pulmonary/Chest: Effort normal and breath sounds normal. No respiratory distress.   Abdominal: Soft. Bowel sounds are normal. He exhibits no distension. There is no tenderness.   Neurological: He is alert and oriented to person, place, and time.   Skin: Skin is warm and dry. He is not diaphoretic.   Psychiatric: He has a normal mood and affect. His behavior is normal. Judgment and thought content normal.       Assessment/Plan     Problem List Items Addressed This Visit     None      Visit Diagnoses     Suspected sleep apnea    -  Primary    Relevant Orders    Ambulatory Referral to Sleep Medicine    Sleep walking        Relevant Orders    Ambulatory Referral to Sleep Medicine          Plan: Refer to sleep medicine for sleep study. Follow up as needed.     Patient's Body mass index is 36.44 kg/m². BMI is above normal parameters. Recommendations " include: educational material.   (Normal BMI:  18.5-24.9, OW 25-29.9, Obesity 30 or greater)        Understands disease processes and need for medications.  Understands reasons for urgent and emergent care.  Patient (& family) verbalized agreement for treatment plan.   Emotional support and active listening provided.  Patient provided time to verbalize feelings.               This document has been electronically signed by MARY Rivera   January 24, 2020 10:10 AM

## 2020-01-24 NOTE — PATIENT INSTRUCTIONS

## 2020-01-24 NOTE — TELEPHONE ENCOUNTER
Will you let him know that after doing some research, his problem could have several causes, one of which being sleep deprivation. Medications are not recommended but otc melatonin may help. He should make sure his home is safe and nothing sharp is out in the open for him to fall on if he falls. We will proceed with the referral to sleep medicine.

## 2020-07-17 ENCOUNTER — OFFICE VISIT (OUTPATIENT)
Dept: FAMILY MEDICINE CLINIC | Facility: CLINIC | Age: 30
End: 2020-07-17

## 2020-07-17 VITALS
WEIGHT: 254.8 LBS | OXYGEN SATURATION: 97 % | DIASTOLIC BLOOD PRESSURE: 78 MMHG | SYSTOLIC BLOOD PRESSURE: 120 MMHG | BODY MASS INDEX: 35.67 KG/M2 | HEART RATE: 117 BPM | HEIGHT: 71 IN | TEMPERATURE: 97.8 F

## 2020-07-17 DIAGNOSIS — H65.192 OTHER NON-RECURRENT ACUTE NONSUPPURATIVE OTITIS MEDIA OF LEFT EAR: Primary | ICD-10-CM

## 2020-07-17 PROCEDURE — 99213 OFFICE O/P EST LOW 20 MIN: CPT | Performed by: NURSE PRACTITIONER

## 2020-07-17 RX ORDER — AMOXICILLIN AND CLAVULANATE POTASSIUM 875; 125 MG/1; MG/1
1 TABLET, FILM COATED ORAL 2 TIMES DAILY
Qty: 20 TABLET | Refills: 0 | Status: SHIPPED | OUTPATIENT
Start: 2020-07-17 | End: 2020-07-27

## 2020-07-17 NOTE — PROGRESS NOTES
"Subjective   Rupert Bean is a 30 y.o. male.     Chief Complaint   Patient presents with   • Earache       He presents with c/o earache in the left ear for the past few days. He states he tried some otc meds for swimmers ear that didn't help. He states he couldn't open his jaw fully last night.        The following portions of the patient's history were reviewed and updated as appropriate: allergies, current medications, past family history, past medical history, past social history, past surgical history and problem list.    Review of Systems   Constitutional: Negative for fever and unexpected weight change.   HENT: Positive for ear pain. Negative for rhinorrhea, sore throat and trouble swallowing.    Eyes: Negative for visual disturbance.   Respiratory: Negative for cough, shortness of breath and wheezing.    Cardiovascular: Negative for chest pain and palpitations.   Gastrointestinal: Negative for abdominal pain, blood in stool, constipation, diarrhea, nausea and vomiting.   Genitourinary: Negative for dysuria and hematuria.   Musculoskeletal: Negative for arthralgias and myalgias.   Skin: Negative for color change.   Allergic/Immunologic: Negative for environmental allergies.   Neurological: Negative for dizziness and headaches.   Hematological: Negative for adenopathy.   Psychiatric/Behavioral: Negative for sleep disturbance and suicidal ideas. The patient is not nervous/anxious.        Objective     /78 (BP Location: Right arm, Patient Position: Sitting, Cuff Size: Adult)   Pulse 117   Temp 97.8 °F (36.6 °C) (Temporal)   Ht 179.1 cm (70.51\")   Wt 116 kg (254 lb 12.8 oz)   SpO2 97%   BMI 36.03 kg/m²     Physical Exam   Constitutional: He is oriented to person, place, and time. He appears well-developed and well-nourished. No distress.   HENT:   Head: Normocephalic and atraumatic.   Right Ear: Hearing, tympanic membrane, external ear and ear canal normal.   Left Ear: Hearing, external ear and ear " canal normal. There is tenderness. Tympanic membrane is erythematous and bulging.   Nose: Nose normal. Right sinus exhibits no maxillary sinus tenderness and no frontal sinus tenderness. Left sinus exhibits no maxillary sinus tenderness and no frontal sinus tenderness.   Mouth/Throat: Uvula is midline, oropharynx is clear and moist and mucous membranes are normal.   Eyes: Pupils are equal, round, and reactive to light. Conjunctivae, EOM and lids are normal.   Neck: Trachea normal. No tracheal tenderness present. No tracheal deviation present.   Cardiovascular: Normal rate, regular rhythm, S1 normal, S2 normal, normal heart sounds and intact distal pulses. Exam reveals no gallop and no friction rub.   No murmur heard.  Pulmonary/Chest: Effort normal and breath sounds normal. No respiratory distress.   Abdominal: Soft. Bowel sounds are normal. He exhibits no distension. There is no tenderness.   Neurological: He is alert and oriented to person, place, and time.   Skin: Skin is warm and dry. He is not diaphoretic.   Psychiatric: He has a normal mood and affect. His behavior is normal. Judgment and thought content normal.   Vitals reviewed.      Assessment/Plan     Problem List Items Addressed This Visit     None      Visit Diagnoses     Other non-recurrent acute nonsuppurative otitis media of left ear    -  Primary    Relevant Medications    amoxicillin-clavulanate (Augmentin) 875-125 MG per tablet          Plan: Augmentin ordered for otitis media. Use tylenol for pain. Follow up as needed.     Patient's Body mass index is 36.03 kg/m². BMI is above normal parameters. Recommendations include: educational material.   (Normal BMI:  18.5-24.9, OW 25-29.9, Obesity 30 or greater)             Understands disease processes and need for medications.  Understands reasons for urgent and emergent care.  Patient (& family) verbalized agreement for treatment plan.   Emotional support and active listening provided.  Patient provided  time to verbalize feelings.               This document has been electronically signed by MARY Rivera   July 17, 2020 16:17

## 2020-07-17 NOTE — PATIENT INSTRUCTIONS

## 2020-10-09 ENCOUNTER — OFFICE VISIT (OUTPATIENT)
Dept: FAMILY MEDICINE CLINIC | Facility: CLINIC | Age: 30
End: 2020-10-09

## 2020-10-09 VITALS
BODY MASS INDEX: 35 KG/M2 | WEIGHT: 250 LBS | HEIGHT: 71 IN | OXYGEN SATURATION: 98 % | SYSTOLIC BLOOD PRESSURE: 120 MMHG | RESPIRATION RATE: 18 BRPM | TEMPERATURE: 96.8 F | HEART RATE: 98 BPM | DIASTOLIC BLOOD PRESSURE: 80 MMHG

## 2020-10-09 DIAGNOSIS — H60.501 ACUTE OTITIS EXTERNA OF RIGHT EAR, UNSPECIFIED TYPE: Primary | ICD-10-CM

## 2020-10-09 PROCEDURE — 99213 OFFICE O/P EST LOW 20 MIN: CPT | Performed by: NURSE PRACTITIONER

## 2020-10-09 RX ORDER — AMOXICILLIN AND CLAVULANATE POTASSIUM 875; 125 MG/1; MG/1
1 TABLET, FILM COATED ORAL 2 TIMES DAILY
Qty: 20 TABLET | Refills: 0 | Status: SHIPPED | OUTPATIENT
Start: 2020-10-09 | End: 2020-10-19

## 2020-10-09 RX ORDER — OFLOXACIN 3 MG/ML
5 SOLUTION AURICULAR (OTIC) 2 TIMES DAILY
Qty: 10 ML | Refills: 0 | Status: SHIPPED | OUTPATIENT
Start: 2020-10-09 | End: 2021-09-02

## 2020-10-09 NOTE — PROGRESS NOTES
"Subjective   Rupert Bean is a 30 y.o. male.     Chief Complaint   Patient presents with   • Earache       He presents with c/o an earache that started a couple days ago, worse on the right side. He denies fever or chills. He has recurrent ear infections.       The following portions of the patient's history were reviewed and updated as appropriate: allergies, current medications, past family history, past medical history, past social history, past surgical history and problem list.    Review of Systems   Constitutional: Negative for fever and unexpected weight change.   HENT: Positive for ear pain. Negative for rhinorrhea, sore throat and trouble swallowing.    Eyes: Negative for visual disturbance.   Respiratory: Negative for cough, shortness of breath and wheezing.    Cardiovascular: Negative for chest pain and palpitations.   Gastrointestinal: Negative for abdominal pain, blood in stool, constipation, diarrhea, nausea and vomiting.   Genitourinary: Negative for dysuria and hematuria.   Musculoskeletal: Negative for arthralgias and myalgias.   Skin: Negative for color change.   Allergic/Immunologic: Negative for environmental allergies.   Neurological: Negative for dizziness and headaches.   Hematological: Negative for adenopathy.   Psychiatric/Behavioral: Negative for sleep disturbance and suicidal ideas. The patient is not nervous/anxious.        Objective     /80 (BP Location: Right arm, Patient Position: Sitting, Cuff Size: Adult)   Pulse 98   Temp 96.8 °F (36 °C) (Temporal)   Resp 18   Ht 179.1 cm (70.51\")   Wt 113 kg (250 lb)   SpO2 98%   BMI 35.35 kg/m²     Physical Exam  Vitals signs reviewed.   Constitutional:       General: He is not in acute distress.     Appearance: He is well-developed. He is not diaphoretic.   HENT:      Head: Normocephalic and atraumatic.      Right Ear: Hearing, tympanic membrane and external ear normal. Drainage and swelling present.      Left Ear: Hearing, " tympanic membrane, ear canal and external ear normal.      Nose: Nose normal.      Right Sinus: No maxillary sinus tenderness or frontal sinus tenderness.      Left Sinus: No maxillary sinus tenderness or frontal sinus tenderness.      Mouth/Throat:      Pharynx: Uvula midline.   Eyes:      General: Lids are normal.      Conjunctiva/sclera: Conjunctivae normal.      Pupils: Pupils are equal, round, and reactive to light.   Neck:      Trachea: Trachea normal. No tracheal tenderness or tracheal deviation.   Cardiovascular:      Rate and Rhythm: Normal rate and regular rhythm.      Heart sounds: Normal heart sounds, S1 normal and S2 normal. No murmur. No friction rub. No gallop.    Pulmonary:      Effort: Pulmonary effort is normal. No respiratory distress.      Breath sounds: Normal breath sounds.   Abdominal:      General: Bowel sounds are normal. There is no distension.      Palpations: Abdomen is soft.      Tenderness: There is no abdominal tenderness.   Skin:     General: Skin is warm and dry.   Neurological:      Mental Status: He is alert and oriented to person, place, and time.   Psychiatric:         Behavior: Behavior normal.         Thought Content: Thought content normal.         Judgment: Judgment normal.         Assessment/Plan     Problem List Items Addressed This Visit     None      Visit Diagnoses     Acute otitis externa of right ear, unspecified type    -  Primary    Relevant Medications    amoxicillin-clavulanate (Augmentin) 875-125 MG per tablet    ofloxacin (FLOXIN) 0.3 % otic solution          Plan: Augmentin and floxin otic ordered for otitis externa. You may use sweet oil for pain. Follow up as needed.     Patient's Body mass index is 35.35 kg/m². BMI is above normal parameters. Recommendations include: educational material.   (Normal BMI:  18.5-24.9, OW 25-29.9, Obesity 30 or greater)             Understands disease processes and need for medications.  Understands reasons for urgent and  emergent care.  Patient (& family) verbalized agreement for treatment plan.   Emotional support and active listening provided.  Patient provided time to verbalize feelings.               This document has been electronically signed by MARY Rivera   October 9, 2020 15:36 EDT

## 2020-11-09 ENCOUNTER — OFFICE VISIT (OUTPATIENT)
Dept: PULMONOLOGY | Facility: CLINIC | Age: 30
End: 2020-11-09

## 2020-11-09 VITALS
WEIGHT: 250 LBS | HEIGHT: 70 IN | SYSTOLIC BLOOD PRESSURE: 126 MMHG | HEART RATE: 74 BPM | BODY MASS INDEX: 35.79 KG/M2 | OXYGEN SATURATION: 97 % | DIASTOLIC BLOOD PRESSURE: 86 MMHG | TEMPERATURE: 97.8 F

## 2020-11-09 DIAGNOSIS — G47.33 OSA (OBSTRUCTIVE SLEEP APNEA): Primary | ICD-10-CM

## 2020-11-09 DIAGNOSIS — E66.9 OBESITY (BMI 30-39.9): ICD-10-CM

## 2020-11-09 PROCEDURE — 99213 OFFICE O/P EST LOW 20 MIN: CPT | Performed by: NURSE PRACTITIONER

## 2020-11-09 RX ORDER — CETIRIZINE HYDROCHLORIDE 10 MG/1
10 TABLET ORAL DAILY
COMMUNITY
End: 2021-09-02

## 2020-11-09 NOTE — PROGRESS NOTES
Do you smoke? no      Lung Function Test? no  Chest X-Ray? yes    CT Chest? no Allergy Test? no    Family hx of Lung disease or Lung Cancer?yes    If FHx is posivitive for lung cancer, what is the relationship of the family member? father    Shortness of breath? no    How far can you walk without getting short of breath? No shortness of breath.    Any coughing? no    Any wheezing? no    Acid Reflux? no    Do you snore? yes    Daytime Fatigue? 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 no    What inhalers are you currently using? None    Have you had the Influenza Vaccine? no   Would you like to receive this Vaccine today? no    Have you had the Pneumonia Vaccine?  no  Would you like to receive this Vaccine today? no      Subjective    Rupert Bean presents for the following Sleep Apnea      History of Present Illness     Mr. Bean is a 30-year-old male with no significant medical history.    He presents today for evaluation of sleep apnea.  He complains of daytime fatigue and daytime sleepiness.  He also complains that he snores at night.  He reports waking up several times a night feeling as if he is smothering.  He states that he has nonrestorative sleep.  He also reports falling asleep frequently throughout the day.  He has no witnessed episodes of apnea, however he does live alone.  He is a lifelong non-smoker and voices no other complaints at today's visit.      Review of Systems   Constitutional: Positive for fatigue. Negative for activity change, appetite change, chills and unexpected weight change.   HENT: Negative for congestion, postnasal drip and rhinorrhea.    Respiratory: Negative for apnea, cough, chest tightness, shortness of breath and wheezing.    Cardiovascular: Negative for chest pain, palpitations and leg swelling.   Gastrointestinal: Negative for nausea.   Musculoskeletal: Negative for gait problem.   Skin: Negative for pallor.  "  Allergic/Immunologic: Negative for environmental allergies.   Neurological: Negative for syncope.   Psychiatric/Behavioral: Negative for confusion. The patient is not nervous/anxious.        Active Problems:  Problem List Items Addressed This Visit     None      Visit Diagnoses     DALLIN (obstructive sleep apnea)    -  Primary    Relevant Orders    Home Sleep Study    Obesity (BMI 30-39.9)              Past Medical History:  Past Medical History:   Diagnosis Date   • Broken leg    • Bronchitis    • History of ear infections        Family History:  Family History   Problem Relation Age of Onset   • Hypertension Mother    • Anuerysm Father    • Diabetes Maternal Grandfather    • Heart disease Maternal Grandfather        Social History:  Social History     Tobacco Use   • Smoking status: Never Smoker   • Smokeless tobacco: Never Used   Substance Use Topics   • Alcohol use: Yes     Alcohol/week: 1.0 standard drinks     Types: 1 Cans of beer per week       Current Medications:  Current Outpatient Medications   Medication Sig Dispense Refill   • cetirizine (zyrTEC) 10 MG tablet Take 10 mg by mouth Daily.     • ofloxacin (FLOXIN) 0.3 % otic solution Administer 5 drops to the right ear 2 (Two) Times a Day. 10 mL 0   • vitamin D (ERGOCALCIFEROL) 92857 units capsule capsule Take 1 capsule by mouth 1 (One) Time Per Week. 4 capsule 6     No current facility-administered medications for this visit.        Allergies:  No Known Allergies    Vitals:  /86   Pulse 74   Temp 97.8 °F (36.6 °C) (Temporal)   Ht 177.8 cm (70\")   Wt 113 kg (250 lb)   SpO2 97%   BMI 35.87 kg/m²     Imaging:    Imaging Results (Most Recent)     None          Pulmonary Functions Testing Results:    No results found for: FEV1, FVC, VHU7AOC, TLC, DLCO    Results for orders placed or performed in visit on 10/04/19   Genital Culture - Swab, Penis    Specimen: Penis; Swab    SWAB   Result Value Ref Range    Culture Final report     Result 1 Mixed skin " srini    POCT urinalysis dipstick, manual    Specimen: Urine   Result Value Ref Range    Color Dark Yellow Yellow, Straw, Dark Yellow, Karissa    Clarity, UA Clear Clear    Glucose, UA Negative (A) Negative, 1000 mg/dL (3+) mg/dL    Bilirubin Negative Negative    Ketones, UA Negative Negative    Specific Gravity  1.015 1.005 - 1.030    Blood, UA Negative Negative    pH, Urine 6.5 5.0 - 8.0    Protein, POC Trace (A) Negative mg/dL    Urobilinogen, UA Normal Normal    Leukocytes Negative Negative    Nitrite, UA Negative Negative       Objective   Physical Exam     GENERAL APPEARANCE: Well developed, well nourished, alert and cooperative, and appears to be in no acute distress.    HEAD: normocephalic. Atraumatic.    EYES: PERRL, EOMI. Vision is grossly intact.    THROAT: Oral cavity and pharynx normal. No inflammation, swelling, exudate, or lesions.     NECK: Neck supple.  No thyromegaly.    CARDIAC: Normal S1 and S2. No S3, S4 or murmurs. Rhythm is regular.     RESPIRATORY:Bilateral air entry positive. Bilateral diminished breath sounds. No wheezing, crackles or rhonchi noted.    GI: Positive bowel sounds. Soft, nondistended, nontender.     MUSCULOSKELETAL: No significant deformity or joint abnormality. No edema. Peripheral pulses intact. No varicosities.    NEUROLOGICAL: Strength and sensation symmetric and intact throughout.     PSYCHIATRIC: The mental examination revealed the patient was oriented to person, place, and time.       Assessment/Plan      DALLIN:  -Ordered a home sleep study to evaluate for sleep apnea..  -Patient's Body mass index is 35.87 kg/m². BMI is above normal parameters. Recommendations include: exercise counseling and nutrition counseling.  - Patient was educated on positive airway pressure treatment.  As per CMS guidelines, more than 4 hours on 70% of observed nights is considered adherence. Patient was strongly encouraged to use CPAP as much as possible during sleep as more CPAP use is equal to  more benefit. Use of heated humidification in positive airway pressure treatment to improve the adherence to the device.  In case of claustrophobia, we will provide the patient cognitive behavioral therapy and desensitization. Oral appliances use will be discussed with the patient in case of mild to moderate sleep apnea or if the patient with severe disease fail positive airway pressure treatment.       The patient was extensively educated on the consequences of untreated obstructive sleep apnea namely cardiovascular/metabolic disorder, neurocognitive deficit, daytime sleepiness, motor vehicle accidents, depression, mood disorders and reduced quality of life.  At the end of conversation, the patient voices understanding of the disease process and treatment modality.  Patient also understands the risk of untreated obstructive sleep apnea and benefit benefits of the treatment.    Counseling time was greater than 10 minutes.        ICD-10-CM ICD-9-CM   1. DALLIN (obstructive sleep apnea)  G47.33 327.23   2. Obesity (BMI 30-39.9)  E66.9 278.00       Return in about 3 months (around 2/9/2021).

## 2021-01-04 ENCOUNTER — TELEPHONE (OUTPATIENT)
Dept: PULMONOLOGY | Facility: CLINIC | Age: 31
End: 2021-01-04

## 2021-01-04 DIAGNOSIS — G47.33 OSA (OBSTRUCTIVE SLEEP APNEA): Primary | ICD-10-CM

## 2021-01-04 NOTE — TELEPHONE ENCOUNTER
Patient called and I spoke with him to report his sleep study results and to inquire about DME company. Order was forwarded to [facility] for set up.

## 2021-03-18 ENCOUNTER — BULK ORDERING (OUTPATIENT)
Dept: CASE MANAGEMENT | Facility: OTHER | Age: 31
End: 2021-03-18

## 2021-03-18 DIAGNOSIS — Z23 IMMUNIZATION DUE: ICD-10-CM

## 2021-04-14 ENCOUNTER — IMMUNIZATION (OUTPATIENT)
Dept: VACCINE CLINIC | Facility: HOSPITAL | Age: 31
End: 2021-04-14

## 2021-04-14 PROCEDURE — 91300 HC SARSCOV02 VAC 30MCG/0.3ML IM: CPT | Performed by: INTERNAL MEDICINE

## 2021-04-14 PROCEDURE — 0001A: CPT | Performed by: INTERNAL MEDICINE

## 2021-05-05 ENCOUNTER — IMMUNIZATION (OUTPATIENT)
Dept: VACCINE CLINIC | Facility: HOSPITAL | Age: 31
End: 2021-05-05

## 2021-05-05 PROCEDURE — 91300 HC SARSCOV02 VAC 30MCG/0.3ML IM: CPT | Performed by: INTERNAL MEDICINE

## 2021-05-05 PROCEDURE — 0002A: CPT | Performed by: INTERNAL MEDICINE

## 2021-09-02 ENCOUNTER — OFFICE VISIT (OUTPATIENT)
Dept: FAMILY MEDICINE CLINIC | Facility: CLINIC | Age: 31
End: 2021-09-02

## 2021-09-02 VITALS
SYSTOLIC BLOOD PRESSURE: 130 MMHG | HEART RATE: 114 BPM | BODY MASS INDEX: 36.59 KG/M2 | HEIGHT: 70 IN | DIASTOLIC BLOOD PRESSURE: 82 MMHG | WEIGHT: 255.6 LBS | TEMPERATURE: 97.5 F | OXYGEN SATURATION: 96 %

## 2021-09-02 DIAGNOSIS — Z99.89 OSA ON CPAP: Chronic | ICD-10-CM

## 2021-09-02 DIAGNOSIS — G47.33 OSA ON CPAP: Chronic | ICD-10-CM

## 2021-09-02 DIAGNOSIS — R07.9 CHEST PAIN, UNSPECIFIED TYPE: Primary | ICD-10-CM

## 2021-09-02 PROBLEM — N50.819 TESTALGIA: Status: RESOLVED | Noted: 2018-09-14 | Resolved: 2021-09-02

## 2021-09-02 PROCEDURE — 99214 OFFICE O/P EST MOD 30 MIN: CPT | Performed by: FAMILY MEDICINE

## 2021-09-02 PROCEDURE — 36415 COLL VENOUS BLD VENIPUNCTURE: CPT | Performed by: FAMILY MEDICINE

## 2021-09-02 NOTE — PROGRESS NOTES
Subjective   Rupert Bean is a 31 y.o. male.     History of Present Illness about a 2-month history of episodic evening chest discomfort.  Usually when lies down and in the presleep phase.  Unfortunately because of equipment being recalled has had no use of BiPAP machine for a while.  Confident that it has contributed to the sleep problems.  The chest discomfort occasionally can radiate up toward the jaw.  Nonexertional not associated with palpitations GI respiratory.  Can sometimes positionally move to left shoulder but that is with certain recumbent positions.    The following portions of the patient's history were reviewed and updated as appropriate: allergies, current medications, past medical history, past social history, past surgical history and problem list.    Review of Systems  See history of Present Illness     Objective     Physical Exam  Vitals reviewed.   Constitutional:       Appearance: Normal appearance.   HENT:      Head: Normocephalic.   Eyes:      Conjunctiva/sclera: Conjunctivae normal.   Cardiovascular:      Rate and Rhythm: Normal rate and regular rhythm.      Heart sounds: Normal heart sounds.   Pulmonary:      Effort: Pulmonary effort is normal.      Breath sounds: Normal breath sounds.      Comments: No chest wall tenderness on palpation.  Abdominal:      Tenderness: There is no abdominal tenderness.   Musculoskeletal:         General: Normal range of motion.      Cervical back: Normal range of motion and neck supple.   Skin:     General: Skin is warm and dry.   Neurological:      Mental Status: He is alert and oriented to person, place, and time.   Psychiatric:         Mood and Affect: Mood normal.         PHQ-9 Total Score: 0    Body mass index is 36.67 kg/m².       Assessment/Plan     Diagnoses and all orders for this visit:    1. Chest pain, unspecified type (Primary)  -     CBC & Differential  -     Comprehensive Metabolic Panel  -     Lipid Panel  -     TSH  -     ECG 12 Lead;  Future    2. DALLIN on CPAP    Will check labs and EKG as noted.  Potentially multifactorial based on timing in relationship with sleep challenges.  Family history of heart disease remotely and grandmother.  Will notify of results and make further recommendations.  Maintain pandemic response.                     This document has been electronically signed by Marquis Rahman MD   September 2, 2021 15:47 EDT    Part of this note may be an electronic transcription/translation of spoken language to printed text using the Dragon Dictation System.

## 2021-09-03 LAB
ALBUMIN SERPL-MCNC: 4.9 G/DL (ref 3.5–5.2)
ALBUMIN/GLOB SERPL: 1.6 G/DL
ALP SERPL-CCNC: 75 U/L (ref 39–117)
ALT SERPL-CCNC: 40 U/L (ref 1–41)
AST SERPL-CCNC: 24 U/L (ref 1–40)
BASOPHILS # BLD AUTO: 0.07 10*3/MM3 (ref 0–0.2)
BASOPHILS NFR BLD AUTO: 0.7 % (ref 0–1.5)
BILIRUB SERPL-MCNC: 0.3 MG/DL (ref 0–1.2)
BUN SERPL-MCNC: 17 MG/DL (ref 6–20)
BUN/CREAT SERPL: 17.3 (ref 7–25)
CALCIUM SERPL-MCNC: 9.4 MG/DL (ref 8.6–10.5)
CHLORIDE SERPL-SCNC: 100 MMOL/L (ref 98–107)
CHOLEST SERPL-MCNC: 192 MG/DL (ref 0–200)
CO2 SERPL-SCNC: 22.6 MMOL/L (ref 22–29)
CREAT SERPL-MCNC: 0.98 MG/DL (ref 0.76–1.27)
EOSINOPHIL # BLD AUTO: 0.15 10*3/MM3 (ref 0–0.4)
EOSINOPHIL NFR BLD AUTO: 1.5 % (ref 0.3–6.2)
ERYTHROCYTE [DISTWIDTH] IN BLOOD BY AUTOMATED COUNT: 12.9 % (ref 12.3–15.4)
GLOBULIN SER CALC-MCNC: 3 GM/DL
GLUCOSE SERPL-MCNC: 84 MG/DL (ref 65–99)
HCT VFR BLD AUTO: 46.9 % (ref 37.5–51)
HDLC SERPL-MCNC: 41 MG/DL (ref 40–60)
HGB BLD-MCNC: 14.8 G/DL (ref 13–17.7)
IMM GRANULOCYTES # BLD AUTO: 0.03 10*3/MM3 (ref 0–0.05)
IMM GRANULOCYTES NFR BLD AUTO: 0.3 % (ref 0–0.5)
LDLC SERPL CALC-MCNC: 118 MG/DL (ref 0–100)
LYMPHOCYTES # BLD AUTO: 2.23 10*3/MM3 (ref 0.7–3.1)
LYMPHOCYTES NFR BLD AUTO: 22.1 % (ref 19.6–45.3)
MCH RBC QN AUTO: 27.5 PG (ref 26.6–33)
MCHC RBC AUTO-ENTMCNC: 31.6 G/DL (ref 31.5–35.7)
MCV RBC AUTO: 87.2 FL (ref 79–97)
MONOCYTES # BLD AUTO: 0.71 10*3/MM3 (ref 0.1–0.9)
MONOCYTES NFR BLD AUTO: 7 % (ref 5–12)
NEUTROPHILS # BLD AUTO: 6.92 10*3/MM3 (ref 1.7–7)
NEUTROPHILS NFR BLD AUTO: 68.4 % (ref 42.7–76)
NRBC BLD AUTO-RTO: 0 /100 WBC (ref 0–0.2)
PLATELET # BLD AUTO: 419 10*3/MM3 (ref 140–450)
POTASSIUM SERPL-SCNC: 4.2 MMOL/L (ref 3.5–5.2)
PROT SERPL-MCNC: 7.9 G/DL (ref 6–8.5)
RBC # BLD AUTO: 5.38 10*6/MM3 (ref 4.14–5.8)
SODIUM SERPL-SCNC: 135 MMOL/L (ref 136–145)
TRIGL SERPL-MCNC: 187 MG/DL (ref 0–150)
TSH SERPL DL<=0.005 MIU/L-ACNC: 2.27 UIU/ML (ref 0.27–4.2)
VLDLC SERPL CALC-MCNC: 33 MG/DL (ref 5–40)
WBC # BLD AUTO: 10.11 10*3/MM3 (ref 3.4–10.8)

## 2021-09-03 NOTE — PROGRESS NOTES
Lab testing unremarkable.  Cholesterol profile not ideal with LDL fraction higher than desirable.  Less meats fats dairy foods.  We will notify you of EKG results.

## 2021-11-15 ENCOUNTER — HOSPITAL ENCOUNTER (OUTPATIENT)
Dept: ULTRASOUND IMAGING | Facility: HOSPITAL | Age: 31
Discharge: HOME | End: 2021-11-15
Admitting: INTERNAL MEDICINE

## 2021-11-15 ENCOUNTER — OFFICE VISIT (OUTPATIENT)
Dept: FAMILY MEDICINE CLINIC | Facility: CLINIC | Age: 31
End: 2021-11-15

## 2021-11-15 VITALS
TEMPERATURE: 98.2 F | BODY MASS INDEX: 36.36 KG/M2 | HEART RATE: 70 BPM | OXYGEN SATURATION: 96 % | DIASTOLIC BLOOD PRESSURE: 79 MMHG | HEIGHT: 70 IN | WEIGHT: 254 LBS | SYSTOLIC BLOOD PRESSURE: 116 MMHG

## 2021-11-15 DIAGNOSIS — N50.811 TESTICULAR PAIN, RIGHT: Primary | ICD-10-CM

## 2021-11-15 DIAGNOSIS — N50.811 TESTICULAR PAIN, RIGHT: ICD-10-CM

## 2021-11-15 PROCEDURE — 99214 OFFICE O/P EST MOD 30 MIN: CPT | Performed by: INTERNAL MEDICINE

## 2021-11-15 PROCEDURE — 76870 US EXAM SCROTUM: CPT | Performed by: RADIOLOGY

## 2021-11-15 PROCEDURE — 76870 US EXAM SCROTUM: CPT

## 2021-11-15 RX ORDER — CEFDINIR 300 MG/1
300 CAPSULE ORAL 2 TIMES DAILY
Qty: 20 CAPSULE | Refills: 0 | Status: SHIPPED | OUTPATIENT
Start: 2021-11-15 | End: 2022-05-12

## 2021-11-15 RX ORDER — AZITHROMYCIN 500 MG/1
1000 TABLET, FILM COATED ORAL DAILY
Qty: 2 TABLET | Refills: 0 | Status: SHIPPED | OUTPATIENT
Start: 2021-11-15 | End: 2022-05-12

## 2021-11-15 NOTE — PROGRESS NOTES
Patient Name: Rupert Bean Today's Date: 2021   Patient MRN / CSN: [id number] / [id number] Date of Encounter: 11/15/2021   Patient Age / : 31 y.o. / 1990 Encounter Provider: Carol Boswell DO   Referring Physician: No ref. provider found          Rupert is a 31 y.o. who is being seen today for Groin Swelling (Patient reports pain/swelling in testicle that radiates to right side of groin. Reports symptoms worsened over past 2 days )      History of Present Illness    Allergies include:Patient has no known allergies.  Current Outpatient Medications   Medication Sig Dispense Refill   • azithromycin (Zithromax) 500 MG tablet Take 2 tablets by mouth Daily. 2 tablet 0   • cefdinir (OMNICEF) 300 MG capsule Take 1 capsule by mouth 2 (Two) Times a Day. 20 capsule 0     No current facility-administered medications for this visit.     Past Medical History:   Diagnosis Date   • Broken leg    • Bronchitis    • History of ear infections      Family History   Problem Relation Age of Onset   • Hypertension Mother    • Anuerysm Father    • Diabetes Maternal Grandfather    • Heart disease Maternal Grandfather      Past Surgical History:   Procedure Laterality Date   • FRACTURE SURGERY Right      Social History     Substance and Sexual Activity   Alcohol Use Yes   • Alcohol/week: 1.0 standard drink   • Types: 1 Cans of beer per week     Social History     Tobacco Use   Smoking Status Never Smoker   Smokeless Tobacco Never Used     Social History     Substance and Sexual Activity   Drug Use No     Review of Systems   Constitutional: Positive for chills. Negative for fever.   Gastrointestinal: Negative for abdominal pain, nausea and vomiting.   Genitourinary: Positive for dysuria, frequency, testicular pain and urgency. Negative for hematuria, penile discharge, penile pain and penile swelling.        Right groin pain        Depression Assessment Review:  PHQ-9 Total Score:    Vital Signs & Measurements Taken  "This Encounter  /79 (BP Location: Left arm, Patient Position: Sitting, Cuff Size: Adult)   Pulse 70   Temp 98.2 °F (36.8 °C) (Temporal)   Ht 177.8 cm (70\")   Wt 115 kg (254 lb)   SpO2 96%   BMI 36.45 kg/m²    SpO2 Percentage    11/15/21 1426   SpO2: 96%        Physical Exam         Assessment & Plan  Patient Active Problem List   Diagnosis   • DALLIN on CPAP       ICD-10-CM ICD-9-CM   1. Testicular pain, right  N50.811 608.9     Orders Placed This Encounter   Procedures   • US scrotum and testicles     Standing Status:   Future     Number of Occurrences:   1     Standing Expiration Date:   11/15/2022     Order Specific Question:   Reason for Exam:     Answer:   testicular pain     Order Specific Question:   Release to patient     Answer:   Immediate   • Urinalysis With Culture If Indicated - Urine, Random Void     Order Specific Question:   Release to patient     Answer:   Immediate       Meds Ordered During Visit:  New Medications Ordered This Visit   Medications   • cefdinir (OMNICEF) 300 MG capsule     Sig: Take 1 capsule by mouth 2 (Two) Times a Day.     Dispense:  20 capsule     Refill:  0   • azithromycin (Zithromax) 500 MG tablet     Sig: Take 2 tablets by mouth Daily.     Dispense:  2 tablet     Refill:  0       Return if symptoms worsen or fail to improve, for Next scheduled follow up.          Referring Provider (if known): No ref. provider found      This document has been electronically signed by Carol Boswell DO  November 16, 2021 09:51 EST    Carol Boswell DO, [facility]  [address] [place] [address]  [phone] (office)    Part of this note may be an electronic transcription/translation of spoken language to printed text using the Dragon Dictation System.  "

## 2021-11-15 NOTE — PROGRESS NOTES
Patient Name: Rupert Bean Today's Date: 2021   Patient MRN / CSN: [id number] / [id number] Date of Encounter: 11/15/2021   Patient Age / : 31 y.o. / 1990 Encounter Provider: Carol Boswell DO   Referring Physician: No ref. provider found          Rupert is a 31 y.o. who is being seen today for Groin Swelling (Patient reports pain/swelling in testicle that radiates to right side of groin. Reports symptoms worsened over past 2 days )      Rupert presents today for an acute visit with complaints of right groin swelling with testicular pain.  He reports having such symptoms in the past and has been evaluated by urology for this previously.  He reports the symptoms had been alleviated with exercise until recently.  He noted right testicular pain dramatically worsened over the past 2 days.  He denies any fever.  He denies dysuria or hematuria.  He reports the right testicle pain radiates up into his right groin area.  He denies any palpable masses on his testicle.  He denies any history or family history of testicular torsion.  He denies any discharge from his penis.  He denies any genital lesions.  He reports not being sexually active over the past 2 months.  His sexual activity 2 months ago was protected with condom use.      Allergies include:Patient has no known allergies.  Current Outpatient Medications   Medication Sig Dispense Refill   • azithromycin (Zithromax) 500 MG tablet Take 2 tablets by mouth Daily. 2 tablet 0   • cefdinir (OMNICEF) 300 MG capsule Take 1 capsule by mouth 2 (Two) Times a Day. 20 capsule 0     No current facility-administered medications for this visit.     Past Medical History:   Diagnosis Date   • Broken leg    • Bronchitis    • History of ear infections      Family History   Problem Relation Age of Onset   • Hypertension Mother    • Anuerysm Father    • Diabetes Maternal Grandfather    • Heart disease Maternal Grandfather      Past Surgical History:   Procedure  "Laterality Date   • FRACTURE SURGERY Right 1994     Social History     Substance and Sexual Activity   Alcohol Use Yes   • Alcohol/week: 1.0 standard drink   • Types: 1 Cans of beer per week     Social History     Tobacco Use   Smoking Status Never Smoker   Smokeless Tobacco Never Used     Social History     Substance and Sexual Activity   Drug Use No     Review of Systems   Constitutional: Negative for fever.   Gastrointestinal: Negative for nausea and vomiting.   Genitourinary: Positive for testicular pain. Negative for dysuria, hematuria, penile discharge, penile pain and penile swelling.        Depression Assessment Review:  PHQ-9 Total Score:    Vital Signs & Measurements Taken This Encounter  /79 (BP Location: Left arm, Patient Position: Sitting, Cuff Size: Adult)   Pulse 70   Temp 98.2 °F (36.8 °C) (Temporal)   Ht 177.8 cm (70\")   Wt 115 kg (254 lb)   SpO2 96%   BMI 36.45 kg/m²    SpO2 Percentage    11/15/21 1426   SpO2: 96%        Physical Exam  Vitals reviewed.   Constitutional:       General: He is not in acute distress.  HENT:      Head: Normocephalic and atraumatic.   Cardiovascular:      Rate and Rhythm: Normal rate and regular rhythm.   Pulmonary:      Effort: Pulmonary effort is normal. No respiratory distress.      Breath sounds: Normal breath sounds. No wheezing or rhonchi.   Genitourinary:     Penis: Normal.       Comments: Exam was performed in the presence of Claudia Lilly MA.  No inguinal hernias palpated on exam.  No genital lesions or discharge from penis.  No testicular nodules palpated.  Right testicle is tender to palpation.  Left testicle is nontender.  Musculoskeletal:         General: No swelling.   Skin:     General: Skin is warm and dry.      Coloration: Skin is not jaundiced.   Neurological:      Mental Status: He is alert.   Psychiatric:         Mood and Affect: Mood normal.         Behavior: Behavior normal.              Assessment & Plan  Patient Active Problem List "   Diagnosis   • DALLIN on CPAP       ICD-10-CM ICD-9-CM   1. Testicular pain, right  N50.811 608.9     Orders Placed This Encounter   Procedures   • US scrotum and testicles     Standing Status:   Future     Number of Occurrences:   1     Standing Expiration Date:   11/15/2022     Order Specific Question:   Reason for Exam:     Answer:   testicular pain     Order Specific Question:   Release to patient     Answer:   Immediate   • Urinalysis With Culture If Indicated - Urine, Random Void     Order Specific Question:   Release to patient     Answer:   Immediate       Meds Ordered During Visit:  New Medications Ordered This Visit   Medications   • cefdinir (OMNICEF) 300 MG capsule     Sig: Take 1 capsule by mouth 2 (Two) Times a Day.     Dispense:  20 capsule     Refill:  0   • azithromycin (Zithromax) 500 MG tablet     Sig: Take 2 tablets by mouth Daily.     Dispense:  2 tablet     Refill:  0       I recommended stat testicular ultrasound to rule out testicular torsion. Urine analysis with culture was obtained and pending.   I explained that I would send in antibiotics for him.  We will update him on urine culture results when available.    Return if symptoms worsen or fail to improve, for Next scheduled follow up.          Referring Provider (if known): No ref. provider found      This document has been electronically signed by Carol Boswell DO  November 16, 2021 09:51 EST    Carol Boswell DO, [facility]  [address] [place] [address]  [phone] (office)    Part of this note may be an electronic transcription/translation of spoken language to printed text using the Dragon Dictation System.

## 2021-11-16 LAB
APPEARANCE UR: CLEAR
BACTERIA #/AREA URNS HPF: NORMAL /HPF
BILIRUB UR QL STRIP: NEGATIVE
CASTS URNS QL MICRO: NORMAL /LPF
COLOR UR: YELLOW
EPI CELLS #/AREA URNS HPF: NORMAL /HPF (ref 0–10)
GLUCOSE UR QL: NEGATIVE
HGB UR QL STRIP: NEGATIVE
KETONES UR QL STRIP: NEGATIVE
LEUKOCYTE ESTERASE UR QL STRIP: NEGATIVE
MICRO URNS: NORMAL
MICRO URNS: NORMAL
NITRITE UR QL STRIP: NEGATIVE
PH UR STRIP: 7 [PH] (ref 5–7.5)
PROT UR QL STRIP: NEGATIVE
RBC #/AREA URNS HPF: NORMAL /HPF (ref 0–2)
SP GR UR: 1.02 (ref 1–1.03)
URINALYSIS REFLEX: NORMAL
UROBILINOGEN UR STRIP-MCNC: 1 MG/DL (ref 0.2–1)
WBC #/AREA URNS HPF: NORMAL /HPF (ref 0–5)

## 2022-02-07 ENCOUNTER — CLINICAL SUPPORT (OUTPATIENT)
Dept: FAMILY MEDICINE CLINIC | Facility: CLINIC | Age: 32
End: 2022-02-07

## 2022-02-07 DIAGNOSIS — Z20.822 EXPOSURE TO COVID-19 VIRUS: Primary | ICD-10-CM

## 2022-02-07 LAB
FLUAV SUBTYP SPEC NAA+PROBE: NOT DETECTED
FLUBV RNA ISLT QL NAA+PROBE: NOT DETECTED
SARS-COV-2 RNA PNL SPEC NAA+PROBE: NOT DETECTED

## 2022-02-07 PROCEDURE — 87636 SARSCOV2 & INF A&B AMP PRB: CPT | Performed by: FAMILY MEDICINE

## 2022-05-12 ENCOUNTER — OFFICE VISIT (OUTPATIENT)
Dept: FAMILY MEDICINE CLINIC | Facility: CLINIC | Age: 32
End: 2022-05-12

## 2022-05-12 VITALS
OXYGEN SATURATION: 98 % | HEART RATE: 73 BPM | TEMPERATURE: 98.2 F | HEIGHT: 70 IN | DIASTOLIC BLOOD PRESSURE: 93 MMHG | BODY MASS INDEX: 34.84 KG/M2 | WEIGHT: 243.4 LBS | SYSTOLIC BLOOD PRESSURE: 125 MMHG

## 2022-05-12 DIAGNOSIS — S99.911A INJURY OF RIGHT ANKLE, INITIAL ENCOUNTER: Primary | ICD-10-CM

## 2022-05-12 PROCEDURE — 99213 OFFICE O/P EST LOW 20 MIN: CPT | Performed by: FAMILY MEDICINE

## 2022-05-12 NOTE — PROGRESS NOTES
Subjective   Rupert Bean is a 32 y.o. male.     History of Present Illness right ankle discomfort that started several days ago after attempting to initiate walk around exercise routine.  Transient essentially now resolved discomfort that seem to be in the posterior lateral aspect radiating proximally.  No swelling redness.  No instability.  Felt may be perceived a pop.  No other current concerns.  Desires to try to exercise.    The following portions of the patient's history were reviewed and updated as appropriate: allergies, current medications, past family history, past social history, past surgical history and problem list.    Review of Systems  See history of Present Illness     Objective     Physical Exam  Constitutional:       Appearance: Normal appearance.   Musculoskeletal:      Comments: Exam of ankle shows good pulses.  No skin breakdown.  No swelling.  No redness.  No point tenderness today.  No instability appreciated.  Good proprioception and strength.   Skin:     General: Skin is warm and dry.   Neurological:      Mental Status: He is alert and oriented to person, place, and time.         PHQ-9 Total Score:                Assessment & Plan     Diagnoses and all orders for this visit:    1. Injury of right ankle, initial encounter (Primary)    Exam is presently totally unremarkable to me.  I would encourage you to resume walking activities and consideration of jogging eventually.  I believe you do well.  If you have any challenges whatsoever please contact us and we will be happy to make referral to orthopedist.  A subacute injury could become chronic and you are very desirous of instituting some exercise.  Follow-up pending.  Work on therapeutic lifestyle changes in regard to that mild elevated BP.                     This document has been electronically signed by Marquis Rahman MD   May 12, 2022 16:17 EDT    Part of this note may be an electronic transcription/translation of spoken  language to printed text using the Dragon Dictation System.

## 2022-09-14 ENCOUNTER — HOSPITAL ENCOUNTER (EMERGENCY)
Facility: HOSPITAL | Age: 32
Discharge: HOME | End: 2022-09-14
Attending: STUDENT IN AN ORGANIZED HEALTH CARE EDUCATION/TRAINING PROGRAM | Admitting: STUDENT IN AN ORGANIZED HEALTH CARE EDUCATION/TRAINING PROGRAM

## 2022-09-14 ENCOUNTER — APPOINTMENT (OUTPATIENT)
Dept: CT IMAGING | Facility: HOSPITAL | Age: 32
End: 2022-09-14

## 2022-09-14 ENCOUNTER — APPOINTMENT (OUTPATIENT)
Dept: GENERAL RADIOLOGY | Facility: HOSPITAL | Age: 32
End: 2022-09-14

## 2022-09-14 VITALS
RESPIRATION RATE: 18 BRPM | SYSTOLIC BLOOD PRESSURE: 136 MMHG | WEIGHT: 250 LBS | HEART RATE: 86 BPM | BODY MASS INDEX: 35.79 KG/M2 | TEMPERATURE: 97.5 F | HEIGHT: 70 IN | DIASTOLIC BLOOD PRESSURE: 85 MMHG | OXYGEN SATURATION: 96 %

## 2022-09-14 DIAGNOSIS — R20.0 LEFT ARM NUMBNESS: ICD-10-CM

## 2022-09-14 DIAGNOSIS — G43.809 OTHER MIGRAINE WITHOUT STATUS MIGRAINOSUS, NOT INTRACTABLE: Primary | ICD-10-CM

## 2022-09-14 DIAGNOSIS — R07.89 CHEST PRESSURE: ICD-10-CM

## 2022-09-14 LAB
ALBUMIN SERPL-MCNC: 4.52 G/DL (ref 3.5–5.2)
ALBUMIN/GLOB SERPL: 1.3 G/DL
ALP SERPL-CCNC: 76 U/L (ref 39–117)
ALT SERPL W P-5'-P-CCNC: 51 U/L (ref 1–41)
ANION GAP SERPL CALCULATED.3IONS-SCNC: 10.8 MMOL/L (ref 5–15)
AST SERPL-CCNC: 32 U/L (ref 1–40)
BASOPHILS # BLD AUTO: 0.05 10*3/MM3 (ref 0–0.2)
BASOPHILS NFR BLD AUTO: 0.6 % (ref 0–1.5)
BILIRUB SERPL-MCNC: 0.2 MG/DL (ref 0–1.2)
BUN SERPL-MCNC: 20 MG/DL (ref 6–20)
BUN/CREAT SERPL: 19.2 (ref 7–25)
CALCIUM SPEC-SCNC: 9.6 MG/DL (ref 8.6–10.5)
CHLORIDE SERPL-SCNC: 102 MMOL/L (ref 98–107)
CO2 SERPL-SCNC: 27.2 MMOL/L (ref 22–29)
CREAT SERPL-MCNC: 1.04 MG/DL (ref 0.76–1.27)
DEPRECATED RDW RBC AUTO: 37.2 FL (ref 37–54)
EGFRCR SERPLBLD CKD-EPI 2021: 98.4 ML/MIN/1.73
EOSINOPHIL # BLD AUTO: 0.25 10*3/MM3 (ref 0–0.4)
EOSINOPHIL NFR BLD AUTO: 2.8 % (ref 0.3–6.2)
ERYTHROCYTE [DISTWIDTH] IN BLOOD BY AUTOMATED COUNT: 12.4 % (ref 12.3–15.4)
FLUAV RNA RESP QL NAA+PROBE: NOT DETECTED
FLUBV RNA RESP QL NAA+PROBE: NOT DETECTED
GLOBULIN UR ELPH-MCNC: 3.4 GM/DL
GLUCOSE SERPL-MCNC: 98 MG/DL (ref 65–99)
HCT VFR BLD AUTO: 42.2 % (ref 37.5–51)
HGB BLD-MCNC: 14.3 G/DL (ref 13–17.7)
HOLD SPECIMEN: NORMAL
HOLD SPECIMEN: NORMAL
IMM GRANULOCYTES # BLD AUTO: 0.04 10*3/MM3 (ref 0–0.05)
IMM GRANULOCYTES NFR BLD AUTO: 0.4 % (ref 0–0.5)
LYMPHOCYTES # BLD AUTO: 2.34 10*3/MM3 (ref 0.7–3.1)
LYMPHOCYTES NFR BLD AUTO: 26.2 % (ref 19.6–45.3)
MCH RBC QN AUTO: 28.5 PG (ref 26.6–33)
MCHC RBC AUTO-ENTMCNC: 33.9 G/DL (ref 31.5–35.7)
MCV RBC AUTO: 84.2 FL (ref 79–97)
MONOCYTES # BLD AUTO: 0.82 10*3/MM3 (ref 0.1–0.9)
MONOCYTES NFR BLD AUTO: 9.2 % (ref 5–12)
NEUTROPHILS NFR BLD AUTO: 5.43 10*3/MM3 (ref 1.7–7)
NEUTROPHILS NFR BLD AUTO: 60.8 % (ref 42.7–76)
NRBC BLD AUTO-RTO: 0 /100 WBC (ref 0–0.2)
PLATELET # BLD AUTO: 341 10*3/MM3 (ref 140–450)
PMV BLD AUTO: 8.5 FL (ref 6–12)
POTASSIUM SERPL-SCNC: 4.4 MMOL/L (ref 3.5–5.2)
PROT SERPL-MCNC: 7.9 G/DL (ref 6–8.5)
QT INTERVAL: 374 MS
QTC INTERVAL: 439 MS
RBC # BLD AUTO: 5.01 10*6/MM3 (ref 4.14–5.8)
SARS-COV-2 RNA RESP QL NAA+PROBE: NOT DETECTED
SODIUM SERPL-SCNC: 140 MMOL/L (ref 136–145)
TROPONIN T SERPL-MCNC: <0.01 NG/ML (ref 0–0.03)
WBC NRBC COR # BLD: 8.93 10*3/MM3 (ref 3.4–10.8)
WHOLE BLOOD HOLD COAG: NORMAL
WHOLE BLOOD HOLD SPECIMEN: NORMAL

## 2022-09-14 PROCEDURE — 93010 ELECTROCARDIOGRAM REPORT: CPT | Performed by: INTERNAL MEDICINE

## 2022-09-14 PROCEDURE — 85025 COMPLETE CBC W/AUTO DIFF WBC: CPT | Performed by: STUDENT IN AN ORGANIZED HEALTH CARE EDUCATION/TRAINING PROGRAM

## 2022-09-14 PROCEDURE — 80053 COMPREHEN METABOLIC PANEL: CPT | Performed by: STUDENT IN AN ORGANIZED HEALTH CARE EDUCATION/TRAINING PROGRAM

## 2022-09-14 PROCEDURE — 99283 EMERGENCY DEPT VISIT LOW MDM: CPT

## 2022-09-14 PROCEDURE — 87636 SARSCOV2 & INF A&B AMP PRB: CPT | Performed by: STUDENT IN AN ORGANIZED HEALTH CARE EDUCATION/TRAINING PROGRAM

## 2022-09-14 PROCEDURE — 25010000002 KETOROLAC TROMETHAMINE PER 15 MG: Performed by: STUDENT IN AN ORGANIZED HEALTH CARE EDUCATION/TRAINING PROGRAM

## 2022-09-14 PROCEDURE — 70450 CT HEAD/BRAIN W/O DYE: CPT

## 2022-09-14 PROCEDURE — 84484 ASSAY OF TROPONIN QUANT: CPT | Performed by: STUDENT IN AN ORGANIZED HEALTH CARE EDUCATION/TRAINING PROGRAM

## 2022-09-14 PROCEDURE — 96374 THER/PROPH/DIAG INJ IV PUSH: CPT

## 2022-09-14 PROCEDURE — 71046 X-RAY EXAM CHEST 2 VIEWS: CPT

## 2022-09-14 PROCEDURE — 93005 ELECTROCARDIOGRAM TRACING: CPT | Performed by: STUDENT IN AN ORGANIZED HEALTH CARE EDUCATION/TRAINING PROGRAM

## 2022-09-14 RX ORDER — KETOROLAC TROMETHAMINE 30 MG/ML
15 INJECTION, SOLUTION INTRAMUSCULAR; INTRAVENOUS ONCE
Status: COMPLETED | OUTPATIENT
Start: 2022-09-14 | End: 2022-09-14

## 2022-09-14 RX ORDER — BUTALBITAL, ACETAMINOPHEN AND CAFFEINE 50; 325; 40 MG/1; MG/1; MG/1
1 TABLET ORAL ONCE
Status: COMPLETED | OUTPATIENT
Start: 2022-09-14 | End: 2022-09-14

## 2022-09-14 RX ORDER — KETOROLAC TROMETHAMINE 10 MG/1
10 TABLET, FILM COATED ORAL EVERY 6 HOURS PRN
Status: DISCONTINUED | OUTPATIENT
Start: 2022-09-14 | End: 2022-09-14

## 2022-09-14 RX ORDER — SODIUM CHLORIDE 0.9 % (FLUSH) 0.9 %
10 SYRINGE (ML) INJECTION AS NEEDED
Status: DISCONTINUED | OUTPATIENT
Start: 2022-09-14 | End: 2022-09-14 | Stop reason: HOSPADM

## 2022-09-14 RX ADMIN — BUTALBITAL, ACETAMINOPHEN, AND CAFFEINE 1 TABLET: 50; 325; 40 TABLET ORAL at 03:53

## 2022-09-14 RX ADMIN — SODIUM CHLORIDE 1000 ML: 9 INJECTION, SOLUTION INTRAVENOUS at 04:22

## 2022-09-14 RX ADMIN — KETOROLAC TROMETHAMINE 15 MG: 30 INJECTION, SOLUTION INTRAMUSCULAR; INTRAVENOUS at 04:21

## 2022-09-14 NOTE — ED PROVIDER NOTES
[facility]  emergency department encounter        Pt Name: Rupert Bean  MRN: [id number]  Birthdate 1990  Date of evaluation: 9/14/2022    Chief Complaint   Patient presents with   • Chest Pain   • Headache   • Nausea   • Numbness             HISTORY OF PRESENT ILLNESS:   Rupert Bean is a 32 y.o. male overweight otherwise previously healthy.  Reports taking aspirin frequently for stressor to help him sleep but no other medications.  Non-smoker.  Patient presents for headache, chest pressure left arm numbness.  Patient reports onset 1 hour prior to arrival.  Patient took an aspirin and symptoms have significantly improved.  Had nausea previously which has subsequently resolved.  Headache is predominantly left side, left periorbital radiating to the back.  Is prior headaches but none similar to this 1.  Also endorses numbness sensation throughout the left arm.  Denies weakness, vision changes.  No first-degree family history of cardiac disease at a young age.    No other exacerbating or alleviating factors other than as noted above.  Severity: Severe    PCP: Marquis Rahman MD          REVIEW OF SYSTEMS:     Review of Systems   Constitutional: Negative for fever.   HENT: Negative for congestion and rhinorrhea.    Eyes: Negative for visual disturbance.   Respiratory: Negative for cough and shortness of breath.    Cardiovascular: Negative for chest pain.        Chest pressure   Gastrointestinal: Positive for nausea (Resolved). Negative for abdominal pain and vomiting.   Genitourinary: Negative for dysuria.   Musculoskeletal: Negative for myalgias.   Skin: Negative for rash.   Neurological: Positive for numbness and headaches. Negative for weakness.   Psychiatric/Behavioral: Negative for confusion.       Review of systems otherwise as per HPI.          PREVIOUS HISTORY:         Past Medical History:   Diagnosis Date   • Broken leg    • Bronchitis    • History of ear infections             Past Surgical History:   Procedure Laterality Date   • FRACTURE SURGERY Right 1994           Social History     Socioeconomic History   • Marital status: Single   Tobacco Use   • Smoking status: Never Smoker   • Smokeless tobacco: Never Used   Vaping Use   • Vaping Use: Never used   Substance and Sexual Activity   • Alcohol use: Yes     Alcohol/week: 1.0 standard drink     Types: 1 Cans of beer per week   • Drug use: No   • Sexual activity: Defer           Family History   Problem Relation Age of Onset   • Hypertension Mother    • Anuerysm Father    • Diabetes Maternal Grandfather    • Heart disease Maternal Grandfather          No current outpatient medications      Allergies:  Patient has no known allergies.    Medications, allergies and past medical, surgical, family, and social history reviewed.        PHYSICAL EXAM:     Physical Exam  Vitals and nursing note reviewed.   Constitutional:       General: He is not in acute distress.     Appearance: Normal appearance.   HENT:      Head: Normocephalic and atraumatic.   Eyes:      Extraocular Movements: Extraocular movements intact.      Conjunctiva/sclera: Conjunctivae normal.   Cardiovascular:      Rate and Rhythm: Normal rate and regular rhythm.   Pulmonary:      Effort: Pulmonary effort is normal. No respiratory distress.   Abdominal:      General: Abdomen is flat. There is no distension.   Musculoskeletal:         General: No deformity. Normal range of motion.      Cervical back: Normal range of motion. No rigidity.   Neurological:      General: No focal deficit present.      Mental Status: He is alert and oriented to person, place, and time.      Comments: Alert and oriented including to age and month.  Speech clear, articulate, no aphasia, object naming and repetition intact.  Extraocular movement intact without nystagmus, visual fields full.  No facial asymmetry.  No drift in any extremity.  Sensation intact light touch throughout face and all  extremities without asymmetry.  No extinction.  Following commands well, blink  intact.   Psychiatric:         Mood and Affect: Mood normal.         Behavior: Behavior normal.             COMPLETED DIAGNOSTIC STUDIES AND INTERVENTIONS:     Lab Results (last 24 hours)     Procedure Component Value Units Date/Time    COVID PRE-OP / PRE-PROCEDURE SCREENING ORDER (NO ISOLATION) - Swab, Nasopharynx [[id number]]  (Normal) Collected: 09/14/22 0339    Specimen: Swab from Nasopharynx Updated: 09/14/22 0411    Narrative:      The following orders were created for panel order COVID PRE-OP / PRE-PROCEDURE SCREENING ORDER (NO ISOLATION) - Swab, Nasopharynx.  Procedure                               Abnormality         Status                     ---------                               -----------         ------                     COVID-19 and FLU A/B PCR...[[id number]]  Normal              Final result                 Please view results for these tests on the individual orders.    COVID-19 and FLU A/B PCR - Swab, Nasopharynx [[id number]]  (Normal) Collected: 09/14/22 0339    Specimen: Swab from Nasopharynx Updated: 09/14/22 0411     COVID19 Not Detected     Influenza A PCR Not Detected     Influenza B PCR Not Detected    Narrative:      Fact sheet for providers: https://www.fda.gov/media/066042/download    Fact sheet for patients: https://www.fda.gov/media/274733/download    Test performed by PCR.    CBC & Differential [[id number]]  (Normal) Collected: 09/14/22 0345    Specimen: Blood from Arm, Right Updated: 09/14/22 1110    Narrative:      The following orders were created for panel order CBC & Differential.  Procedure                               Abnormality         Status                     ---------                               -----------         ------                     CBC Auto Differential[[id number]]        Normal              Final result                 Please view results for these tests on the individual  orders.    Comprehensive Metabolic Panel [[id number]]  (Abnormal) Collected: 09/14/22 0345    Specimen: Blood from Arm, Right Updated: 09/14/22 0412     Glucose 98 mg/dL      BUN 20 mg/dL      Creatinine 1.04 mg/dL      Sodium 140 mmol/L      Potassium 4.4 mmol/L      Comment: Slight hemolysis detected by analyzer. Results may be affected.        Chloride 102 mmol/L      CO2 27.2 mmol/L      Calcium 9.6 mg/dL      Total Protein 7.9 g/dL      Albumin 4.52 g/dL      ALT (SGPT) 51 U/L      AST (SGOT) 32 U/L      Alkaline Phosphatase 76 U/L      Total Bilirubin 0.2 mg/dL      Globulin 3.4 gm/dL      A/G Ratio 1.3 g/dL      BUN/Creatinine Ratio 19.2     Anion Gap 10.8 mmol/L      eGFR 98.4 mL/min/1.73      Comment: National Kidney Foundation and American Society of Nephrology (ASN) Task Force recommended calculation based on the Chronic Kidney Disease Epidemiology Collaboration (CKD-EPI) equation refit without adjustment for race.       Narrative:      GFR Normal >60  Chronic Kidney Disease <60  Kidney Failure <15      Troponin [[id number]]  (Normal) Collected: 09/14/22 0345    Specimen: Blood from Arm, Right Updated: 09/14/22 0412     Troponin T <0.010 ng/mL     Narrative:      Troponin T Reference Range:  <= 0.03 ng/mL-   Negative for AMI  >0.03 ng/mL-     Abnormal for myocardial necrosis.  Clinicians would have to utilize clinical acumen, EKG, Troponin and serial changes to determine if it is an Acute Myocardial Infarction or myocardial injury due to an underlying chronic condition.       Results may be falsely decreased if patient taking Biotin.      CBC Auto Differential [[id number]]  (Normal) Collected: 09/14/22 0345    Specimen: Blood from Arm, Right Updated: 09/14/22 0353     WBC 8.93 10*3/mm3      RBC 5.01 10*6/mm3      Hemoglobin 14.3 g/dL      Hematocrit 42.2 %      MCV 84.2 fL      MCH 28.5 pg      MCHC 33.9 g/dL      RDW 12.4 %      RDW-SD 37.2 fl      MPV 8.5 fL      Platelets 341 10*3/mm3      Neutrophil %  60.8 %      Lymphocyte % 26.2 %      Monocyte % 9.2 %      Eosinophil % 2.8 %      Basophil % 0.6 %      Immature Grans % 0.4 %      Neutrophils, Absolute 5.43 10*3/mm3      Lymphocytes, Absolute 2.34 10*3/mm3      Monocytes, Absolute 0.82 10*3/mm3      Eosinophils, Absolute 0.25 10*3/mm3      Basophils, Absolute 0.05 10*3/mm3      Immature Grans, Absolute 0.04 10*3/mm3      nRBC 0.0 /100 WBC             CT Head Without Contrast   Final Result   Normal, negative unenhanced head CT.      Signer Name: Andrew Matamoros MD    Signed: 9/14/2022 2:36 AM    Workstation Name: [facility]     [facility]      XR Chest 2 View   Final Result   No acute cardiopulmonary findings.      Signer Name: Andrew Matamoros MD    Signed: 9/14/2022 2:33 AM    Workstation Name: [facility]     [facility]            New Medications Ordered This Visit   Medications   • sodium chloride 0.9 % flush 10 mL   • butalbital-acetaminophen-caffeine (FIORICET, ESGIC) -40 MG per tablet 1 tablet   • ketorolac (TORADOL) injection 15 mg   • sodium chloride 0.9 % bolus 1,000 mL         Procedures            MEDICAL DECISION-MAKING AND ED COURSE:     ED Course as of 09/14/22 0443   Wed Sep 14, 2022   0201 EKG at 1:52 AM NSR sinus arrhythmia, 83 bpm, , QRS 80, QTc 439, regular axis, no significant ST deviation or T wave abnormalities concerning for acute ischemia.  Delayed R wave progression. [KP]   0406 MDM: 32-year-old male presents with left-sided headache, chest pressure, left arm numbness.  Onset 1 hour prior to arrival.  Likely complex migraine.  NIH score 1 for subjective numbness.  CT head negative.  No significant concern for stroke and patient is not candidate for tPA or thrombectomy due to limited symptoms.  Heart score 1.  Patient is overweight but otherwise no significant risk factors for ACS.  Treat headache with Fioricet and Toradol.  Will reassess. [KP]   3662 COVID19: Not Detected [KP]    0442 Troponin T: <0.010 []   0442 Creatinine: 1.04 []   0442 Patient reports symptoms have significantly improved.  I discussed with him regarding second cardiac enzyme troponin had not been assessed yet.  Patient was feeling significantly better and he is low risk, prefers to go home rather than wait a further work-up.  Believe patient is sufficiently low risk given his age and lack of risk factors that he is safe to follow-up with his primary care provider to discuss further work-up.  Patient is agreeable plan for outpatient follow-up and return here for any new onset or worsening symptoms.  All questions answered. []      ED Course User Index  [] Kg Platt MD       ?      FINAL IMPRESSION:       1. Other migraine without status migrainosus, not intractable    2. Chest pressure    3. Left arm numbness         The complaints listed here are new problems to this examiner.      FOLLOW-UP  Marquis Rahman MD  [address] [address]  [phone]    Schedule an appointment as soon as possible for a visit       [facility]  [address] [place] [id number] [phone]    If symptoms worsen      DISPOSITION  ED Disposition     ED Disposition   Discharge    Condition   Stable    Comment   --                   This care is provided during an unprecedented national emergency due to the Novel Coronavirus (COVID-19). COVID-19 infections and transmission risks place heavy strains on healthcare resources. As this pandemic evolves, the Hospital and providers strive to respond fluidly, to remain operational, and to provide care relative to available resources and information. Outcomes are unpredictable and treatments are without well-defined guidelines. Further, the impact of COVID-19 on all aspects of emergency care, including the impact to patients seeking care for reasons other than COVID-19, is unavoidable during this national  emergency.    This note was dictated using a gitxvp-cm-jcnq tool. Occasional wrong-word or 'sound-a-like' substitutions may have occurred due to the inherent limitations of voice recognition software. ?Read the chart carefully and recognize, using context, where substitutions have occurred.    Kg Platt MD  04:43 EDT  9/14/2022             Kg Platt MD  09/14/22 0443

## 2022-09-14 NOTE — DISCHARGE INSTRUCTIONS
Please follow-up with your primary care provider at the next available appointment.  Recommend discuss potential further work-up for your symptoms.  Call 911 or return here for any new onset or worsening symptoms.

## 2022-10-29 ENCOUNTER — APPOINTMENT (OUTPATIENT)
Dept: CT IMAGING | Facility: HOSPITAL | Age: 32
End: 2022-10-29

## 2022-10-29 ENCOUNTER — APPOINTMENT (OUTPATIENT)
Dept: ULTRASOUND IMAGING | Facility: HOSPITAL | Age: 32
End: 2022-10-29

## 2022-10-29 ENCOUNTER — HOSPITAL ENCOUNTER (EMERGENCY)
Facility: HOSPITAL | Age: 32
Discharge: HOME | End: 2022-10-29
Attending: EMERGENCY MEDICINE | Admitting: EMERGENCY MEDICINE

## 2022-10-29 VITALS
BODY MASS INDEX: 35.79 KG/M2 | RESPIRATION RATE: 16 BRPM | HEIGHT: 70 IN | WEIGHT: 250 LBS | TEMPERATURE: 97.3 F | HEART RATE: 70 BPM | OXYGEN SATURATION: 97 % | SYSTOLIC BLOOD PRESSURE: 138 MMHG | DIASTOLIC BLOOD PRESSURE: 73 MMHG

## 2022-10-29 DIAGNOSIS — N50.812 PAIN IN LEFT TESTICLE: Primary | ICD-10-CM

## 2022-10-29 LAB
ALBUMIN SERPL-MCNC: 4.49 G/DL (ref 3.5–5.2)
ALBUMIN/GLOB SERPL: 1.2 G/DL
ALP SERPL-CCNC: 74 U/L (ref 39–117)
ALT SERPL W P-5'-P-CCNC: 55 U/L (ref 1–41)
ANION GAP SERPL CALCULATED.3IONS-SCNC: 12.9 MMOL/L (ref 5–15)
AST SERPL-CCNC: 34 U/L (ref 1–40)
BASOPHILS # BLD AUTO: 0.06 10*3/MM3 (ref 0–0.2)
BASOPHILS NFR BLD AUTO: 0.7 % (ref 0–1.5)
BILIRUB SERPL-MCNC: 0.4 MG/DL (ref 0–1.2)
BILIRUB UR QL STRIP: NEGATIVE
BUN SERPL-MCNC: 15 MG/DL (ref 6–20)
BUN/CREAT SERPL: 15.5 (ref 7–25)
C TRACH RRNA CVX QL NAA+PROBE: NOT DETECTED
CALCIUM SPEC-SCNC: 9.3 MG/DL (ref 8.6–10.5)
CHLORIDE SERPL-SCNC: 100 MMOL/L (ref 98–107)
CLARITY UR: CLEAR
CO2 SERPL-SCNC: 25.1 MMOL/L (ref 22–29)
COLOR UR: YELLOW
CREAT SERPL-MCNC: 0.97 MG/DL (ref 0.76–1.27)
DEPRECATED RDW RBC AUTO: 38.3 FL (ref 37–54)
EGFRCR SERPLBLD CKD-EPI 2021: 107 ML/MIN/1.73
EOSINOPHIL # BLD AUTO: 0.17 10*3/MM3 (ref 0–0.4)
EOSINOPHIL NFR BLD AUTO: 2 % (ref 0.3–6.2)
ERYTHROCYTE [DISTWIDTH] IN BLOOD BY AUTOMATED COUNT: 12.5 % (ref 12.3–15.4)
GLOBULIN UR ELPH-MCNC: 3.7 GM/DL
GLUCOSE SERPL-MCNC: 82 MG/DL (ref 65–99)
GLUCOSE UR STRIP-MCNC: NEGATIVE MG/DL
HCT VFR BLD AUTO: 43.6 % (ref 37.5–51)
HGB BLD-MCNC: 14.5 G/DL (ref 13–17.7)
HGB UR QL STRIP.AUTO: NEGATIVE
HOLD SPECIMEN: NORMAL
HOLD SPECIMEN: NORMAL
IMM GRANULOCYTES # BLD AUTO: 0.03 10*3/MM3 (ref 0–0.05)
IMM GRANULOCYTES NFR BLD AUTO: 0.4 % (ref 0–0.5)
KETONES UR QL STRIP: NEGATIVE
LEUKOCYTE ESTERASE UR QL STRIP.AUTO: NEGATIVE
LYMPHOCYTES # BLD AUTO: 2.2 10*3/MM3 (ref 0.7–3.1)
LYMPHOCYTES NFR BLD AUTO: 25.9 % (ref 19.6–45.3)
MCH RBC QN AUTO: 28.2 PG (ref 26.6–33)
MCHC RBC AUTO-ENTMCNC: 33.3 G/DL (ref 31.5–35.7)
MCV RBC AUTO: 84.8 FL (ref 79–97)
MONOCYTES # BLD AUTO: 0.69 10*3/MM3 (ref 0.1–0.9)
MONOCYTES NFR BLD AUTO: 8.1 % (ref 5–12)
N GONORRHOEA RRNA SPEC QL NAA+PROBE: NOT DETECTED
NEUTROPHILS NFR BLD AUTO: 5.34 10*3/MM3 (ref 1.7–7)
NEUTROPHILS NFR BLD AUTO: 62.9 % (ref 42.7–76)
NITRITE UR QL STRIP: NEGATIVE
NRBC BLD AUTO-RTO: 0 /100 WBC (ref 0–0.2)
PH UR STRIP.AUTO: 5.5 [PH] (ref 5–8)
PLATELET # BLD AUTO: 378 10*3/MM3 (ref 140–450)
PMV BLD AUTO: 8.4 FL (ref 6–12)
POTASSIUM SERPL-SCNC: 4.2 MMOL/L (ref 3.5–5.2)
PROT SERPL-MCNC: 8.2 G/DL (ref 6–8.5)
PROT UR QL STRIP: NEGATIVE
RBC # BLD AUTO: 5.14 10*6/MM3 (ref 4.14–5.8)
SODIUM SERPL-SCNC: 138 MMOL/L (ref 136–145)
SP GR UR STRIP: 1.02 (ref 1–1.03)
TRICHOMONAS VAGINALIS PCR: NOT DETECTED
UROBILINOGEN UR QL STRIP: NORMAL
WBC NRBC COR # BLD: 8.49 10*3/MM3 (ref 3.4–10.8)
WHOLE BLOOD HOLD COAG: NORMAL
WHOLE BLOOD HOLD SPECIMEN: NORMAL

## 2022-10-29 PROCEDURE — 81003 URINALYSIS AUTO W/O SCOPE: CPT | Performed by: PHYSICIAN ASSISTANT

## 2022-10-29 PROCEDURE — 87491 CHLMYD TRACH DNA AMP PROBE: CPT | Performed by: PHYSICIAN ASSISTANT

## 2022-10-29 PROCEDURE — 25010000002 IOPAMIDOL 61 % SOLUTION: Performed by: EMERGENCY MEDICINE

## 2022-10-29 PROCEDURE — 87661 TRICHOMONAS VAGINALIS AMPLIF: CPT | Performed by: PHYSICIAN ASSISTANT

## 2022-10-29 PROCEDURE — 85025 COMPLETE CBC W/AUTO DIFF WBC: CPT | Performed by: PHYSICIAN ASSISTANT

## 2022-10-29 PROCEDURE — 76870 US EXAM SCROTUM: CPT

## 2022-10-29 PROCEDURE — 74177 CT ABD & PELVIS W/CONTRAST: CPT

## 2022-10-29 PROCEDURE — 87591 N.GONORRHOEAE DNA AMP PROB: CPT | Performed by: PHYSICIAN ASSISTANT

## 2022-10-29 PROCEDURE — 80053 COMPREHEN METABOLIC PANEL: CPT | Performed by: PHYSICIAN ASSISTANT

## 2022-10-29 PROCEDURE — 99283 EMERGENCY DEPT VISIT LOW MDM: CPT

## 2022-10-29 RX ORDER — SODIUM CHLORIDE 0.9 % (FLUSH) 0.9 %
10 SYRINGE (ML) INJECTION AS NEEDED
Status: DISCONTINUED | OUTPATIENT
Start: 2022-10-29 | End: 2022-10-29 | Stop reason: HOSPADM

## 2022-10-29 RX ADMIN — IOPAMIDOL 82 ML: 612 INJECTION, SOLUTION INTRAVENOUS at 17:20

## 2023-03-16 ENCOUNTER — OFFICE VISIT (OUTPATIENT)
Dept: FAMILY MEDICINE CLINIC | Facility: CLINIC | Age: 33
End: 2023-03-16
Payer: COMMERCIAL

## 2023-03-16 VITALS
BODY MASS INDEX: 36.35 KG/M2 | HEART RATE: 92 BPM | SYSTOLIC BLOOD PRESSURE: 126 MMHG | TEMPERATURE: 97.1 F | DIASTOLIC BLOOD PRESSURE: 88 MMHG | WEIGHT: 253.32 LBS | OXYGEN SATURATION: 96 %

## 2023-03-16 DIAGNOSIS — Z00.00 HEALTH CARE MAINTENANCE: ICD-10-CM

## 2023-03-16 DIAGNOSIS — N50.82 SCROTAL PAIN: ICD-10-CM

## 2023-03-16 DIAGNOSIS — L98.9 LESION OF SKIN OF NOSE: ICD-10-CM

## 2023-03-16 DIAGNOSIS — R30.0 DYSURIA: ICD-10-CM

## 2023-03-16 DIAGNOSIS — S86.011D STRAIN OF RIGHT ACHILLES TENDON, SUBSEQUENT ENCOUNTER: ICD-10-CM

## 2023-03-16 DIAGNOSIS — G47.33 OSA ON CPAP: Primary | Chronic | ICD-10-CM

## 2023-03-16 DIAGNOSIS — Z99.89 OSA ON CPAP: Primary | Chronic | ICD-10-CM

## 2023-03-16 LAB
BILIRUB BLD-MCNC: NEGATIVE MG/DL
CLARITY, POC: CLEAR
COLOR UR: YELLOW
GLUCOSE UR STRIP-MCNC: NEGATIVE MG/DL
KETONES UR QL: NEGATIVE
LEUKOCYTE EST, POC: NEGATIVE
NITRITE UR-MCNC: NEGATIVE MG/ML
PH UR: 6 [PH] (ref 5–8)
PROT UR STRIP-MCNC: ABNORMAL MG/DL
RBC # UR STRIP: NEGATIVE /UL
SP GR UR: 1.03 (ref 1–1.03)
UROBILINOGEN UR QL: NORMAL

## 2023-03-16 PROCEDURE — 36415 COLL VENOUS BLD VENIPUNCTURE: CPT | Performed by: INTERNAL MEDICINE

## 2023-03-16 PROCEDURE — 81002 URINALYSIS NONAUTO W/O SCOPE: CPT | Performed by: INTERNAL MEDICINE

## 2023-03-16 PROCEDURE — 99214 OFFICE O/P EST MOD 30 MIN: CPT | Performed by: INTERNAL MEDICINE

## 2023-03-16 PROCEDURE — 87086 URINE CULTURE/COLONY COUNT: CPT | Performed by: INTERNAL MEDICINE

## 2023-03-16 PROCEDURE — 85027 COMPLETE CBC AUTOMATED: CPT | Performed by: INTERNAL MEDICINE

## 2023-03-16 PROCEDURE — 80061 LIPID PANEL: CPT | Performed by: INTERNAL MEDICINE

## 2023-03-16 PROCEDURE — 80053 COMPREHEN METABOLIC PANEL: CPT | Performed by: INTERNAL MEDICINE

## 2023-03-16 PROCEDURE — 86803 HEPATITIS C AB TEST: CPT | Performed by: INTERNAL MEDICINE

## 2023-03-16 PROCEDURE — 84443 ASSAY THYROID STIM HORMONE: CPT | Performed by: INTERNAL MEDICINE

## 2023-03-16 NOTE — PROGRESS NOTES
"I will have you.  Please let Oj Torres  Patient Name: Rupert Bean Today's Date: 3/16/2023   Patient MRN / CSN: [id number] / [id number] Date of Encounter: 3/16/2023   Patient Age / : 32 y.o. / 1990 Encounter Provider: Carol Boswell DO   Referring Physician: No ref. provider found          Rupert is a 32 y.o. male who is being seen today for Urinary Tract Infection and Skin Problem      History of Present Illness    Rupert presents today for a follow up with complaints of urinary symptoms. He reports burning upon urination over the past 2 weeks. No fever or hematuria. He reports suprapubic pain at times with some radiation to the testicles. He reports this is an intermittent problem that he has had for years. He has seen urology in the past, twice, but not seen them in a couple of years. He has had 3 scrotal ultrasounds in the past which were unrevealing for culprit of this problem.      Rupert complains of a skin problem on his right nose and right ear.  He reports that this has been going on for many months.  He has tried multiple creams including moisturizers, lotions, and steroid cream.  He reports the steroid cream will cause the lesion to become more scaly and peel off within it comes right back.  He has noticed the red skin lesion to be rubbing on his nose.  He has not seen a dermatologist for this but is interested in doing so.      Rupert complains of right ankle and Achilles pain since May 2022.  He recalls running on the track in May 2022 when he had sudden pain in his right Achilles region.  He recalls hearing a \"pop\" from this region and having to limp off the track back to his car.  He saw Dr. Rahman at that time without imaging done and supportive care is recommended.  Patient reports that he has been able to walk on his ankle since but he feels that he cannot run or jog because of this intermittent, dull pain in his right Achilles tendon.  He is interested in Ortho evaluation " before trying to increase his activity.    Rupert has obstructive sleep apnea.  He has a CPAP but reports not wearing it regularly recently as he has some difficulty and tolerating it.  He reports having it on APAP settings.    Allergies include:Patient has no known allergies.  No current outpatient medications on file.     No current facility-administered medications for this visit.     Past Medical History:   Diagnosis Date   • Broken leg    • Bronchitis    • History of ear infections      Family History   Problem Relation Age of Onset   • Hypertension Mother    • Anuerysm Father    • Diabetes Brother    • Diabetes Maternal Grandfather    • Heart disease Maternal Grandfather      Past Surgical History:   Procedure Laterality Date   • FRACTURE SURGERY Right 1994     Social History     Substance and Sexual Activity   Alcohol Use Yes   • Alcohol/week: 1.0 standard drink   • Types: 1 Cans of beer per week    Comment: not weekly, once a month     Social History     Tobacco Use   Smoking Status Never   Smokeless Tobacco Never     Social History     Substance and Sexual Activity   Drug Use No     Review of Systems   Constitutional: Negative for fever.   Gastrointestinal: Positive for abdominal pain. Negative for nausea.   Genitourinary: Positive for dysuria and testicular pain. Negative for hematuria.        Depression Assessment Review:  PHQ-9 Total Score: 0  Vital Signs & Measurements Taken This Encounter  /88 (BP Location: Left arm, Patient Position: Sitting, Cuff Size: Adult)   Pulse 92   Temp 97.1 °F (36.2 °C) (Temporal)   Wt 115 kg (253 lb 5.1 oz)   SpO2 96%   BMI 36.35 kg/m²    SpO2 Percentage    03/16/23 0845   SpO2: 96%        Class 2 Severe Obesity (BMI >=35 and <=39.9). Obesity-related health conditions include the following: obstructive sleep apnea. Obesity is worsening. BMI is is above average; BMI management plan is completed.  Nutritional recommendations were given at checkout.      Physical  Exam  Vitals reviewed.   Constitutional:       General: He is not in acute distress.  HENT:      Head: Normocephalic and atraumatic.      Nose:      Comments: Erythematous, macular, scaly rash on the right nose.  He has a similar lesion on his right ear  Eyes:      General: No scleral icterus.     Extraocular Movements: Extraocular movements intact.      Conjunctiva/sclera: Conjunctivae normal.      Pupils: Pupils are equal, round, and reactive to light.   Cardiovascular:      Rate and Rhythm: Normal rate and regular rhythm.   Pulmonary:      Effort: Pulmonary effort is normal. No respiratory distress.      Breath sounds: Normal breath sounds. No wheezing or rhonchi.   Abdominal:      Palpations: Abdomen is soft.      Tenderness: There is no abdominal tenderness. There is no right CVA tenderness, left CVA tenderness, guarding or rebound.   Genitourinary:     Comments: Patient deferred genital exam today.  Musculoskeletal:         General: No swelling.      Cervical back: Neck supple. No tenderness.      Comments: Right ankle is without deformity, edema or warmth.  Patient demonstrates normal active range of motion of the right lower extremity and foot plantarflexion and dorsiflexion inversion and eversion.   Lymphadenopathy:      Cervical: No cervical adenopathy.   Skin:     General: Skin is warm and dry.      Coloration: Skin is not jaundiced.   Neurological:      Mental Status: He is alert.   Psychiatric:         Mood and Affect: Mood normal.         Behavior: Behavior normal.              Assessment & Plan  Patient Active Problem List   Diagnosis   • DALLIN on CPAP   • Health care maintenance   • Scrotal pain   • Dysuria   • Lesion of skin of nose       ICD-10-CM ICD-9-CM   1. DALLIN on CPAP  G47.33 327.23    Z99.89 V46.8   2. Dysuria  R30.0 788.1   3. Scrotal pain  N50.82 608.9   4. Lesion of skin of nose  L98.9 709.9   5. Health care maintenance  Z00.00 V70.0   6. Strain of right Achilles tendon, subsequent encounter   S86.011D V58.89     845.09     Orders Placed This Encounter   Procedures   • Urine Culture - Urine, Urine, Clean Catch   • Hepatitis C Antibody   • Comprehensive Metabolic Panel     Order Specific Question:   Release to patient     Answer:   Routine Release   • CBC (No Diff)     Order Specific Question:   Release to patient     Answer:   Routine Release   • Lipid Panel   • TSH     Order Specific Question:   Release to patient     Answer:   Routine Release   • Ambulatory Referral to Urology     Referral Priority:   Routine     Referral Type:   Consultation     Referral Reason:   Specialty Services Required     Requested Specialty:   Urology     Number of Visits Requested:   1   • Ambulatory Referral to Dermatology     Referral Priority:   Routine     Referral Type:   Consultation     Referral Reason:   Specialty Services Required     Requested Specialty:   Dermatology     Number of Visits Requested:   1   • Ambulatory Referral to Orthopedic Surgery     Referral Priority:   Routine     Referral Type:   Consultation     Referral Reason:   Specialty Services Required     Requested Specialty:   Orthopedic Surgery     Number of Visits Requested:   1   • POC Urinalysis Dipstick     Order Specific Question:   Release to patient     Answer:   Routine Release       Meds Ordered During Visit:  No orders of the defined types were placed in this encounter.    Urine analysis in office today showed trace protein but was negative for glucose, blood, leukocytes, or nitrites.  We will send urine for culture and hold on antibiotics pending culture.  I recommended patient avoid soft drinks and encouraged water intake.  I will refer to urology, dermatology, and Ortho as requested.  We will also update labs today.  Patient declined scrotal ultrasound at this time, given that he has had 3 normals in the past.  He also declined ankle imaging till after he sees Ortho.    Return in about 6 months (around 9/16/2023), or if symptoms worsen or  fail to improve, for Recheck.          Referring Provider (if known): No ref. provider found      This document has been electronically signed by Carol Boswell DO  March 16, 2023 10:07 EDT    Carol Boswell DO, FACOI  [address] [place] [address]  [phone] (office)    Part of this note may be an electronic transcription/translation of spoken language to printed text using the Dragon Dictation System.

## 2023-03-16 NOTE — PROGRESS NOTES
Venipuncture Blood Specimen Collection  Venipuncture performed in RIGHT ARM by Julianna Wallis RN with good hemostasis. Patient tolerated the procedure well without complications.   03/16/23   Julianna Wallis RN

## 2023-03-17 LAB
ALBUMIN SERPL-MCNC: 4.5 G/DL (ref 3.5–5.2)
ALBUMIN/GLOB SERPL: 1.2 G/DL
ALP SERPL-CCNC: 76 U/L (ref 39–117)
ALT SERPL W P-5'-P-CCNC: 51 U/L (ref 1–41)
ANION GAP SERPL CALCULATED.3IONS-SCNC: 11 MMOL/L (ref 5–15)
AST SERPL-CCNC: 27 U/L (ref 1–40)
BACTERIA SPEC AEROBE CULT: NO GROWTH
BILIRUB SERPL-MCNC: 0.2 MG/DL (ref 0–1.2)
BUN SERPL-MCNC: 20 MG/DL (ref 6–20)
BUN/CREAT SERPL: 21.3 (ref 7–25)
CALCIUM SPEC-SCNC: 9.3 MG/DL (ref 8.6–10.5)
CHLORIDE SERPL-SCNC: 102 MMOL/L (ref 98–107)
CHOLEST SERPL-MCNC: 192 MG/DL (ref 0–200)
CO2 SERPL-SCNC: 26 MMOL/L (ref 22–29)
CREAT SERPL-MCNC: 0.94 MG/DL (ref 0.76–1.27)
DEPRECATED RDW RBC AUTO: 39.7 FL (ref 37–54)
EGFRCR SERPLBLD CKD-EPI 2021: 111.1 ML/MIN/1.73
ERYTHROCYTE [DISTWIDTH] IN BLOOD BY AUTOMATED COUNT: 12.9 % (ref 12.3–15.4)
GLOBULIN UR ELPH-MCNC: 3.8 GM/DL
GLUCOSE SERPL-MCNC: 88 MG/DL (ref 65–99)
HCT VFR BLD AUTO: 44 % (ref 37.5–51)
HCV AB SER DONR QL: NORMAL
HDLC SERPL-MCNC: 39 MG/DL (ref 40–60)
HGB BLD-MCNC: 14.2 G/DL (ref 13–17.7)
LDLC SERPL CALC-MCNC: 137 MG/DL (ref 0–100)
LDLC/HDLC SERPL: 3.49 {RATIO}
MCH RBC QN AUTO: 27.2 PG (ref 26.6–33)
MCHC RBC AUTO-ENTMCNC: 32.3 G/DL (ref 31.5–35.7)
MCV RBC AUTO: 84.1 FL (ref 79–97)
PLATELET # BLD AUTO: 425 10*3/MM3 (ref 140–450)
PMV BLD AUTO: 9.3 FL (ref 6–12)
POTASSIUM SERPL-SCNC: 4.7 MMOL/L (ref 3.5–5.2)
PROT SERPL-MCNC: 8.3 G/DL (ref 6–8.5)
RBC # BLD AUTO: 5.23 10*6/MM3 (ref 4.14–5.8)
SODIUM SERPL-SCNC: 139 MMOL/L (ref 136–145)
TRIGL SERPL-MCNC: 85 MG/DL (ref 0–150)
TSH SERPL DL<=0.05 MIU/L-ACNC: 2.37 UIU/ML (ref 0.27–4.2)
VLDLC SERPL-MCNC: 16 MG/DL (ref 5–40)
WBC NRBC COR # BLD: 9.4 10*3/MM3 (ref 3.4–10.8)

## 2023-03-28 ENCOUNTER — OFFICE VISIT (OUTPATIENT)
Dept: ORTHOPEDIC SURGERY | Facility: CLINIC | Age: 33
End: 2023-03-28
Payer: COMMERCIAL

## 2023-03-28 ENCOUNTER — HOSPITAL ENCOUNTER (OUTPATIENT)
Dept: GENERAL RADIOLOGY | Facility: HOSPITAL | Age: 33
Discharge: HOME | End: 2023-03-28
Admitting: PHYSICIAN ASSISTANT
Payer: COMMERCIAL

## 2023-03-28 VITALS
BODY MASS INDEX: 36.3 KG/M2 | WEIGHT: 253.53 LBS | HEART RATE: 94 BPM | DIASTOLIC BLOOD PRESSURE: 79 MMHG | SYSTOLIC BLOOD PRESSURE: 125 MMHG | HEIGHT: 70 IN

## 2023-03-28 DIAGNOSIS — M25.571 RIGHT ANKLE PAIN, UNSPECIFIED CHRONICITY: Primary | ICD-10-CM

## 2023-03-28 DIAGNOSIS — M76.61 ACHILLES TENDINITIS OF RIGHT LOWER EXTREMITY: Primary | ICD-10-CM

## 2023-03-28 PROCEDURE — 73610 X-RAY EXAM OF ANKLE: CPT | Performed by: RADIOLOGY

## 2023-03-28 PROCEDURE — 99203 OFFICE O/P NEW LOW 30 MIN: CPT | Performed by: PHYSICIAN ASSISTANT

## 2023-03-28 PROCEDURE — 73610 X-RAY EXAM OF ANKLE: CPT

## 2023-03-28 RX ORDER — IBUPROFEN 200 MG
200 TABLET ORAL EVERY 6 HOURS PRN
COMMUNITY

## 2023-03-28 NOTE — PROGRESS NOTES
[facility] – [place], [place] Orthopaedic Surgery New Patient Visit          Patient: Rupert Bean  YOB: 1990  Date of Encounter: 03/28/2023  PCP: Carol Boswell DO      Subjective     Chief Complaint   Patient presents with   • Right Ankle - Pain, Edema, Initial Evaluation     DOI June 2022           History of Present Illness:     Rupert Bean is a 32 y.o. male presents today as result of right posterior ankle pain suffered as result of an acute injury in June 2022.  Patient states that he was performing a running routine whenever he felt a popping sensation to the posterior calf and ankle.  He had swelling and redness following this however he was able to continue to ambulate.  He did discontinue his activity and running which did not seem to help alleviate.  The patient states that he has been becoming more active and jujitsu and this is beginning to bother him over the last several weeks.  He feels the discomfort when prolonged walking or stretching.  He reports occasional irritation and redness and swelling into the posterior calf and ankle.  He denies any significant paresthesias.            Patient Active Problem List   Diagnosis   • DALLIN on CPAP   • Health care maintenance   • Scrotal pain   • Dysuria   • Lesion of skin of nose     Past Medical History:   Diagnosis Date   • Broken leg    • Bronchitis    • History of ear infections      Past Surgical History:   Procedure Laterality Date   • FRACTURE SURGERY Right 1994     Social History     Occupational History   • Not on file   Tobacco Use   • Smoking status: Never   • Smokeless tobacco: Never   Vaping Use   • Vaping Use: Never used   Substance and Sexual Activity   • Alcohol use: Yes     Alcohol/week: 1.0 standard drink     Types: 1 Cans of beer per week     Comment: not weekly, once a month   • Drug use: No   • Sexual activity: Not Currently    Rupert Bean  reports that he has never smoked. He has never used smokeless tobacco.. I have educated  "him on the risk of diseases from using tobacco products such as cancer, COPD and heart disease.        Social History     Social History Narrative   • Not on file     Family History   Problem Relation Age of Onset   • Cancer Mother    • Hypertension Mother    • Cancer Father    • Anuerysm Father    • Diabetes Brother    • Diabetes Maternal Grandfather    • Heart disease Maternal Grandfather      Current Outpatient Medications   Medication Sig Dispense Refill   • ibuprofen (ADVIL,MOTRIN) 200 MG tablet Take 1 tablet by mouth Every 6 (Six) Hours As Needed for Mild Pain.       No current facility-administered medications for this visit.     No Known Allergies         Review of Systems   Constitutional: Negative.   HENT: Negative.    Eyes: Negative.    Cardiovascular: Positive for leg swelling.   Respiratory: Negative.    Endocrine: Negative.    Hematologic/Lymphatic: Negative.    Skin: Negative.    Musculoskeletal:        Pertinent positives listed in HPI   Gastrointestinal: Negative.    Genitourinary: Negative.    Neurological: Negative.    Psychiatric/Behavioral: Negative.    Allergic/Immunologic: Negative.          Objective      Vitals:    03/28/23 1314   BP: 125/79   Pulse: 94   Weight: 115 kg (253 lb 8.5 oz)   Height: 177.8 cm (70\")      Class 2 Severe Obesity (BMI >=35 and <=39.9). Obesity-related health conditions include the following: listed in PMH. Obesity is newly identified. BMI is is above average; BMI management plan is completed. We discussed portion control and increasing exercise.      Physical Exam  Vitals and nursing note reviewed.   Constitutional:       General: He is not in acute distress.     Appearance: Normal appearance. He is not ill-appearing.   HENT:      Head: Normocephalic and atraumatic.      Right Ear: External ear normal.      Left Ear: External ear normal.      Nose: Nose normal.      Mouth/Throat:      Mouth: Mucous membranes are moist.      Pharynx: Oropharynx is clear.   Eyes:      " Extraocular Movements: Extraocular movements intact.      Conjunctiva/sclera: Conjunctivae normal.      Pupils: Pupils are equal, round, and reactive to light.   Cardiovascular:      Rate and Rhythm: Normal rate.      Pulses: Normal pulses.   Pulmonary:      Effort: Pulmonary effort is normal.   Abdominal:      General: There is no distension.   Musculoskeletal:      Cervical back: Normal range of motion. No rigidity.      Comments: Examination today of patient's right lower extremity reveals musculotendinous gastrocnemius Achilles tendon pain with palpation with no significant swelling, ecchymosis or erythema.  Canales test negative.  Patient has palpable Achilles tendon on exam.  The patient has full range of motion with no significant swelling, ecchymosis or erythema.  Neurovascular status grossly intact right lower extremity.   Skin:     General: Skin is warm and dry.      Capillary Refill: Capillary refill takes less than 2 seconds.   Neurological:      General: No focal deficit present.      Mental Status: He is alert and oriented to person, place, and time.   Psychiatric:         Mood and Affect: Mood normal.         Behavior: Behavior normal.                 Radiology:      XR Ankle 3+ View Right    Result Date: 3/28/2023    No acute findings in the right ankle.  This report was finalized on 3/28/2023 1:07 PM by Dr. Stone Lewis MD.              Assessment/Plan        ICD-10-CM ICD-9-CM   1. Achilles tendinitis of right lower extremity  M76.61 726.71       32-year-old male with recurrent musculotendinous junction Achilles tendinitis following a previous acute injury in June 2022 with most recent recurrence exacerbation.  The patient has been taking subtherapeutic ibuprofen with no alleviation.  The patient will implement formal outpatient physical therapy over the course of the next 6 weeks with instructions to return back in 4 weeks.  Modalities as deemed appropriate.  Patient was also counseled on usage  of therapeutic dosage 800 mg ibuprofen 3 times daily with food.  Patient is agreeable to current treatment plan of care.  He will return back in 4 weeks for further evaluation.                      This document was signed by Wilmer Starkey PA-C March 28, 2023     CC: Carol Boswell DO      Dictated Utilizing Dragon Dictation:   Please note that portions of this note were completed with a voice recognition program.   Part of this note may be an electronic transcription/translation of spoken language to printed text using the Dragon Dictation System.

## 2023-04-04 ENCOUNTER — OFFICE VISIT (OUTPATIENT)
Dept: UROLOGY | Facility: CLINIC | Age: 33
End: 2023-04-04
Payer: COMMERCIAL

## 2023-04-04 VITALS
DIASTOLIC BLOOD PRESSURE: 101 MMHG | WEIGHT: 253 LBS | HEIGHT: 70 IN | BODY MASS INDEX: 36.22 KG/M2 | HEART RATE: 76 BPM | SYSTOLIC BLOOD PRESSURE: 122 MMHG

## 2023-04-04 DIAGNOSIS — N41.0 ACUTE PROSTATITIS: Primary | ICD-10-CM

## 2023-04-04 LAB
BILIRUB BLD-MCNC: NEGATIVE MG/DL
CLARITY, POC: CLEAR
COLOR UR: YELLOW
EXPIRATION DATE: NORMAL
GLUCOSE UR STRIP-MCNC: NEGATIVE MG/DL
KETONES UR QL: NEGATIVE
LEUKOCYTE EST, POC: NEGATIVE
Lab: NORMAL
NITRITE UR-MCNC: NEGATIVE MG/ML
PH UR: 6 [PH] (ref 5–8)
PROT UR STRIP-MCNC: NEGATIVE MG/DL
RBC # UR STRIP: NEGATIVE /UL
SP GR UR: 1.02 (ref 1–1.03)
UROBILINOGEN UR QL: NORMAL

## 2023-04-04 PROCEDURE — 99203 OFFICE O/P NEW LOW 30 MIN: CPT

## 2023-04-04 PROCEDURE — 81003 URINALYSIS AUTO W/O SCOPE: CPT

## 2023-04-04 RX ORDER — SULFAMETHOXAZOLE AND TRIMETHOPRIM 800; 160 MG/1; MG/1
1 TABLET ORAL 2 TIMES DAILY
Qty: 28 TABLET | Refills: 0 | Status: SHIPPED | OUTPATIENT
Start: 2023-04-04

## 2023-04-04 NOTE — PROGRESS NOTES
"Chief Complaint:    Chief Complaint   Patient presents with   • Difficulty Urinating       Vital Signs:   BP (!) 122/101 (BP Location: Right arm, Patient Position: Sitting)   Pulse 76   Ht 177.8 cm (70\")   Wt 115 kg (253 lb)   BMI 36.30 kg/m²   Body mass index is 36.3 kg/m².      HPI:  Rupert Bean is a 32 y.o. male who presents today for initial evaluation     History of Present Illness  Mr. Bean presents the clinic for difficulty urinating and right testicular pain.  He has been seen by Dr. Raymundo in the past and had multiple scrotal and testicular ultrasounds that have been normal.  States that his testicular pain is intermittent and comes and goes.  Its worse on the right but can radiate to the left.  He endorses some perineum pain as well associated with dysuria.  Denies any scrotal or testicular trauma.  Does report a history of prostatitis in the past.  Last ultrasound in October showed no abnormalities.  He also had a CT completed shortly after that showed no acute findings.  Physical exam today reveals normal penis, scrotum and testicular is bilaterally.  On digital rectal exam patient has significant tenderness to palpation of the prostate with bogginess noted.  This time we will start him on a 2-week course of Bactrim twice daily for acute prostatitis.      Past Medical History:  Past Medical History:   Diagnosis Date   • Broken leg    • Bronchitis    • History of ear infections        Current Meds:  Current Outpatient Medications   Medication Sig Dispense Refill   • ibuprofen (ADVIL,MOTRIN) 200 MG tablet Take 1 tablet by mouth Every 6 (Six) Hours As Needed for Mild Pain.     • sulfamethoxazole-trimethoprim (Bactrim DS) 800-160 MG per tablet Take 1 tablet by mouth 2 (Two) Times a Day. 28 tablet 0     No current facility-administered medications for this visit.        Allergies:   No Known Allergies     Past Surgical History:  Past Surgical History:   Procedure Laterality Date   • FRACTURE " SURGERY Right 1994       Social History:  Social History     Socioeconomic History   • Marital status: Single   Tobacco Use   • Smoking status: Never   • Smokeless tobacco: Never   Vaping Use   • Vaping Use: Never used   Substance and Sexual Activity   • Alcohol use: Yes     Alcohol/week: 1.0 standard drink     Types: 1 Cans of beer per week     Comment: not weekly, once a month   • Drug use: No   • Sexual activity: Not Currently       Family History:  Family History   Problem Relation Age of Onset   • Cancer Mother    • Hypertension Mother    • Cancer Father    • Anuerysm Father    • Diabetes Brother    • Diabetes Maternal Grandfather    • Heart disease Maternal Grandfather        Review of Systems:  Review of Systems   Constitutional: Negative for fatigue, fever and unexpected weight change.   Respiratory: Negative for chest tightness and shortness of breath.    Cardiovascular: Negative for chest pain.   Gastrointestinal: Positive for abdominal pain. Negative for constipation, diarrhea, nausea and vomiting.   Genitourinary: Positive for dysuria and testicular pain. Negative for difficulty urinating, frequency and urgency.   Skin: Negative for rash.   Psychiatric/Behavioral: Negative for confusion and suicidal ideas.       Physical Exam:  Physical Exam  Constitutional:       General: He is not in acute distress.     Appearance: Normal appearance.   HENT:      Head: Normocephalic and atraumatic.      Nose: Nose normal.      Mouth/Throat:      Mouth: Mucous membranes are moist.   Eyes:      Conjunctiva/sclera: Conjunctivae normal.   Cardiovascular:      Rate and Rhythm: Normal rate and regular rhythm.      Pulses: Normal pulses.      Heart sounds: Normal heart sounds.   Pulmonary:      Effort: Pulmonary effort is normal.      Breath sounds: Normal breath sounds.   Abdominal:      General: Bowel sounds are normal.      Palpations: Abdomen is soft.      Tenderness: There is no right CVA tenderness or left CVA  tenderness.   Genitourinary:     Penis: Normal.       Testes: Normal.      Comments: Tender and boggy prostate  Musculoskeletal:         General: Normal range of motion.      Cervical back: Normal range of motion.   Skin:     General: Skin is warm.   Neurological:      General: No focal deficit present.      Mental Status: He is alert and oriented to person, place, and time.   Psychiatric:         Mood and Affect: Mood normal.         Behavior: Behavior normal.         Thought Content: Thought content normal.         Judgment: Judgment normal.         Recent Image (CT and/or KUB):   CT Abdomen and Pelvis: No results found for this or any previous visit.     CT Stone Protocol: Results for orders placed during the hospital encounter of 09/27/18    CT Abdomen Pelvis Stone Protocol    Narrative  CT ABDOMEN PELVIS STONE PROTOCOL-    REASON FOR EXAM: Flank pain, stone disease suspected; N20.0-Calculus of  kidney.    FINDINGS: Spiral scans were obtained through the kidneys, ureter and  bladder. The kidneys were normal in size and shape. There were no  calcifications in the intrarenal collecting systems of the kidneys. The  intrarenal collecting systems were not dilated. The ureters course  normally through the abdomen and pelvis. There were no stones along the  course of the ureters. The urinary bladder was smooth in contour.    Impression  No evidence of stone or obstruction was seen involving the  collecting system of either kidney. A source for the patient's left  flank pain was not identified.    937.14 mGy.cm  The radiation dose reduction device was utilized for each scan per the  ALARA (as low as reasonably achievable) protocol.    This report was finalized on 9/27/2018 9:18 AM by Dr. Fish Coreas II, MD.     KUB: No results found for this or any previous visit.       Labs:  Brief Urine Lab Results  (Last result in the past 365 days)      Color   Clarity   Blood   Leuk Est   Nitrite   Protein   CREAT   Urine HCG         04/04/23 0956 Yellow   Clear   Negative   Negative   Negative   Negative               Office Visit on 04/04/2023   Component Date Value Ref Range Status   • Color 04/04/2023 Yellow  Yellow, Straw, Dark Yellow, Karissa Final   • Clarity, UA 04/04/2023 Clear  Clear Final   • Specific Gravity  04/04/2023 1.025  1.005 - 1.030 Final   • pH, Urine 04/04/2023 6.0  5.0 - 8.0 Final   • Leukocytes 04/04/2023 Negative  Negative Final   • Nitrite, UA 04/04/2023 Negative  Negative Final   • Protein, POC 04/04/2023 Negative  Negative mg/dL Final   • Glucose, UA 04/04/2023 Negative  Negative mg/dL Final   • Ketones, UA 04/04/2023 Negative  Negative Final   • Urobilinogen, UA 04/04/2023 Normal  Normal, 0.2 E.U./dL Final   • Bilirubin 04/04/2023 Negative  Negative Final   • Blood, UA 04/04/2023 Negative  Negative Final   • Lot Number 04/04/2023 [id number]   Final   • Expiration Date 04/04/2023 2/8/24   Final   Office Visit on 03/16/2023   Component Date Value Ref Range Status   • Color 03/16/2023 Yellow  Yellow, Straw, Dark Yellow, Karissa Final   • Clarity, UA 03/16/2023 Clear  Clear Final   • Glucose, UA 03/16/2023 Negative  Negative mg/dL Final   • Bilirubin 03/16/2023 Negative  Negative Final   • Ketones, UA 03/16/2023 Negative  Negative Final   • Specific Gravity  03/16/2023 1.030  1.005 - 1.030 Final   • Blood, UA 03/16/2023 Negative  Negative Final   • pH, Urine 03/16/2023 6.0  5.0 - 8.0 Final   • Protein, POC 03/16/2023 Trace (A)  Negative mg/dL Final   • Urobilinogen, UA 03/16/2023 Normal  Normal, 0.2 E.U./dL Final   • Leukocytes 03/16/2023 Negative  Negative Final   • Nitrite, UA 03/16/2023 Negative  Negative Final   • Urine Culture 03/16/2023 No growth   Final   • Hepatitis C Ab 03/16/2023 Non-Reactive  Non-Reactive Final   • Glucose 03/16/2023 88  65 - 99 mg/dL Final   • BUN 03/16/2023 20  6 - 20 mg/dL Final   • Creatinine 03/16/2023 0.94  0.76 - 1.27 mg/dL Final   • Sodium 03/16/2023 139  136 - 145 mmol/L Final    • Potassium 03/16/2023 4.7  3.5 - 5.2 mmol/L Final   • Chloride 03/16/2023 102  98 - 107 mmol/L Final   • CO2 03/16/2023 26.0  22.0 - 29.0 mmol/L Final   • Calcium 03/16/2023 9.3  8.6 - 10.5 mg/dL Final   • Total Protein 03/16/2023 8.3  6.0 - 8.5 g/dL Final   • Albumin 03/16/2023 4.5  3.5 - 5.2 g/dL Final   • ALT (SGPT) 03/16/2023 51 (H)  1 - 41 U/L Final   • AST (SGOT) 03/16/2023 27  1 - 40 U/L Final   • Alkaline Phosphatase 03/16/2023 76  39 - 117 U/L Final   • Total Bilirubin 03/16/2023 0.2  0.0 - 1.2 mg/dL Final   • Globulin 03/16/2023 3.8  gm/dL Final   • A/G Ratio 03/16/2023 1.2  g/dL Final   • BUN/Creatinine Ratio 03/16/2023 21.3  7.0 - 25.0 Final   • Anion Gap 03/16/2023 11.0  5.0 - 15.0 mmol/L Final   • eGFR 03/16/2023 111.1  >60.0 mL/min/1.73 Final   • WBC 03/16/2023 9.40  3.40 - 10.80 10*3/mm3 Final   • RBC 03/16/2023 5.23  4.14 - 5.80 10*6/mm3 Final   • Hemoglobin 03/16/2023 14.2  13.0 - 17.7 g/dL Final   • Hematocrit 03/16/2023 44.0  37.5 - 51.0 % Final   • MCV 03/16/2023 84.1  79.0 - 97.0 fL Final   • MCH 03/16/2023 27.2  26.6 - 33.0 pg Final   • MCHC 03/16/2023 32.3  31.5 - 35.7 g/dL Final   • RDW 03/16/2023 12.9  12.3 - 15.4 % Final   • RDW-SD 03/16/2023 39.7  37.0 - 54.0 fl Final   • MPV 03/16/2023 9.3  6.0 - 12.0 fL Final   • Platelets 03/16/2023 425  140 - 450 10*3/mm3 Final   • Total Cholesterol 03/16/2023 192  0 - 200 mg/dL Final   • Triglycerides 03/16/2023 85  0 - 150 mg/dL Final   • HDL Cholesterol 03/16/2023 39 (L)  40 - 60 mg/dL Final   • LDL Cholesterol  03/16/2023 137 (H)  0 - 100 mg/dL Final   • VLDL Cholesterol 03/16/2023 16  5 - 40 mg/dL Final   • LDL/HDL Ratio 03/16/2023 3.49   Final   • TSH 03/16/2023 2.370  0.270 - 4.200 uIU/mL Final        Procedure: None  Procedures     I have reviewed and agree with the above PMH, PSH, FMH, social history, medications, allergies, and labs.      Assessment/Plan:   Problem List Items Addressed This Visit        Genitourinary and Reproductive      Acute prostatitis - Primary    Relevant Medications    sulfamethoxazole-trimethoprim (Bactrim DS) 800-160 MG per tablet    Other Relevant Orders    POC Urinalysis Dipstick, Automated (Completed)       Health Maintenance:   Health Maintenance Due   Topic Date Due   • ANNUAL PHYSICAL  Never done   • COVID-19 Vaccine (3 - Booster for Pfizer series) 06/30/2021   • TDAP/TD VACCINES (2 - Td or Tdap) 03/08/2023        Smoking Counseling: Never smoked or use smokeless tobacco    Urine Incontinence: Patient reports that he is not currently experiencing any symptoms of urinary incontinence.    Patient was given instructions and counseling regarding his condition or for health maintenance advice. Please see specific information pulled into the AVS if appropriate.    Patient Education:   Acute prostatitis -I discussed with the patient the pathophysiology of prostatitis.  Discussed with him acute versus chronic.  Advised patient clinical symptoms versus a positive prostate exam is warranted for antimicrobial and conservative treatment methods.  Educated patient on the use of antibiotics such as sulfa versus fluoroquinolones.  Advised the patient the risks of fluoroquinolones associated with tendinitis/tendon rupture.  This time I am recommending to use warm soaks/compresses twice daily for 10 to 15 minutes at a time.  Also start the patient on Bactrim twice daily for 14 days.  Advised patient that if this is not treated appropriate can lead to chronic prostatitis and unwanted sequelae.  I like to follow-up with him in 2 to 4 weeks for reevaluation of symptoms.  Advised him return to clinic sooner if needed.  Patient verbalized understanding and agreed to plan of care    Visit Diagnoses:    ICD-10-CM ICD-9-CM   1. Acute prostatitis  N41.0 601.0       Meds Ordered During Visit:  New Medications Ordered This Visit   Medications   • sulfamethoxazole-trimethoprim (Bactrim DS) 800-160 MG per tablet     Sig: Take 1 tablet by mouth 2  (Two) Times a Day.     Dispense:  28 tablet     Refill:  0       Follow Up Appointment: 4 weeks  No follow-ups on file.      This document has been electronically signed by Angel Spring PA-C   April 4, 2023 13:29 EDT    Part of this note may be an electronic transcription/translation of spoken language to printed text using the Dragon Dictation System.

## 2023-06-14 ENCOUNTER — TELEPHONE (OUTPATIENT)
Dept: ORTHOPEDIC SURGERY | Facility: CLINIC | Age: 33
End: 2023-06-14
Payer: COMMERCIAL

## 2023-06-14 NOTE — TELEPHONE ENCOUNTER
Returned patients call and let him know that Wilmer may be able to see him for the neck that day and if he couldn't we could always make him an appt to be seen for it before he leaves.

## 2023-06-14 NOTE — TELEPHONE ENCOUNTER
Caller: Rupert Bean    Relationship to patient: Self    Best call back number: [phone]    Chief complaint: NECK PAIN    Type of visit: NEW PROBLEM    Requested date: ASAP    Additional notes:PATIENT IS SCHEDULED FOR A FOLLOW UP ON ANKLE ON 06/23/23. HE WOULD LIKE TO GET HIS NECK PAIN LOOKED AT & WONDERED IF ONE OF THE DOCTOR'S COULD SEE HIM FOR THIS. HE STATES HE CALLED EARLIER & SOMEONE WAS CHECKING FOR HIM. PLEASE CALL BACK TO ADVISE

## 2023-06-15 ENCOUNTER — APPOINTMENT (OUTPATIENT)
Dept: GENERAL RADIOLOGY | Facility: HOSPITAL | Age: 33
DRG: 872 | End: 2023-06-15
Payer: COMMERCIAL

## 2023-06-15 ENCOUNTER — HOSPITAL ENCOUNTER (INPATIENT)
Facility: HOSPITAL | Age: 33
LOS: 2 days | Discharge: HOME | DRG: 872 | End: 2023-06-18
Attending: STUDENT IN AN ORGANIZED HEALTH CARE EDUCATION/TRAINING PROGRAM | Admitting: STUDENT IN AN ORGANIZED HEALTH CARE EDUCATION/TRAINING PROGRAM
Payer: COMMERCIAL

## 2023-06-15 ENCOUNTER — OFFICE VISIT (OUTPATIENT)
Dept: FAMILY MEDICINE CLINIC | Facility: CLINIC | Age: 33
End: 2023-06-15
Payer: COMMERCIAL

## 2023-06-15 VITALS
OXYGEN SATURATION: 98 % | BODY MASS INDEX: 35.58 KG/M2 | TEMPERATURE: 98.7 F | DIASTOLIC BLOOD PRESSURE: 82 MMHG | HEART RATE: 116 BPM | WEIGHT: 248 LBS | SYSTOLIC BLOOD PRESSURE: 136 MMHG

## 2023-06-15 DIAGNOSIS — A87.9 VIRAL MENINGITIS: Primary | ICD-10-CM

## 2023-06-15 DIAGNOSIS — R50.9 FEVER, UNSPECIFIED FEVER CAUSE: ICD-10-CM

## 2023-06-15 DIAGNOSIS — R00.0 TACHYCARDIA: ICD-10-CM

## 2023-06-15 DIAGNOSIS — J06.9 URI, ACUTE: Primary | ICD-10-CM

## 2023-06-15 LAB
ALBUMIN SERPL-MCNC: 4.5 G/DL (ref 3.5–5.2)
ALBUMIN/GLOB SERPL: 1.1 G/DL
ALP SERPL-CCNC: 81 U/L (ref 39–117)
ALT SERPL W P-5'-P-CCNC: 26 U/L (ref 1–41)
ANION GAP SERPL CALCULATED.3IONS-SCNC: 13.5 MMOL/L (ref 5–15)
APTT PPP: 33 SECONDS (ref 26.5–34.5)
AST SERPL-CCNC: 23 U/L (ref 1–40)
BACTERIA UR QL AUTO: ABNORMAL /HPF
BASOPHILS # BLD AUTO: 0.05 10*3/MM3 (ref 0–0.2)
BASOPHILS NFR BLD AUTO: 0.3 % (ref 0–1.5)
BILIRUB SERPL-MCNC: 0.5 MG/DL (ref 0–1.2)
BILIRUB UR QL STRIP: ABNORMAL
BUN SERPL-MCNC: 15 MG/DL (ref 6–20)
BUN/CREAT SERPL: 14.4 (ref 7–25)
CALCIUM SPEC-SCNC: 9.7 MG/DL (ref 8.6–10.5)
CHLORIDE SERPL-SCNC: 102 MMOL/L (ref 98–107)
CLARITY UR: CLEAR
CO2 SERPL-SCNC: 22.5 MMOL/L (ref 22–29)
COLOR UR: ABNORMAL
CREAT SERPL-MCNC: 1.04 MG/DL (ref 0.76–1.27)
D-LACTATE SERPL-SCNC: 1.2 MMOL/L (ref 0.5–2)
DEPRECATED RDW RBC AUTO: 38.3 FL (ref 37–54)
EGFRCR SERPLBLD CKD-EPI 2021: 97.8 ML/MIN/1.73
EOSINOPHIL # BLD AUTO: 0.01 10*3/MM3 (ref 0–0.4)
EOSINOPHIL NFR BLD AUTO: 0.1 % (ref 0.3–6.2)
ERYTHROCYTE [DISTWIDTH] IN BLOOD BY AUTOMATED COUNT: 12.2 % (ref 12.3–15.4)
EXPIRATION DATE: NORMAL
FLUAV AG UPPER RESP QL IA.RAPID: NOT DETECTED
FLUBV AG UPPER RESP QL IA.RAPID: NOT DETECTED
GLOBULIN UR ELPH-MCNC: 4.1 GM/DL
GLUCOSE SERPL-MCNC: 115 MG/DL (ref 65–99)
GLUCOSE UR STRIP-MCNC: NEGATIVE MG/DL
HCT VFR BLD AUTO: 43.8 % (ref 37.5–51)
HGB BLD-MCNC: 14.4 G/DL (ref 13–17.7)
HGB UR QL STRIP.AUTO: NEGATIVE
HYALINE CASTS UR QL AUTO: ABNORMAL /LPF
IMM GRANULOCYTES # BLD AUTO: 0.08 10*3/MM3 (ref 0–0.05)
IMM GRANULOCYTES NFR BLD AUTO: 0.5 % (ref 0–0.5)
INR PPP: 0.99 (ref 0.9–1.1)
INTERNAL CONTROL: NORMAL
KETONES UR QL STRIP: ABNORMAL
LEUKOCYTE ESTERASE UR QL STRIP.AUTO: NEGATIVE
LYMPHOCYTES # BLD AUTO: 1.13 10*3/MM3 (ref 0.7–3.1)
LYMPHOCYTES NFR BLD AUTO: 6.9 % (ref 19.6–45.3)
Lab: NORMAL
MCH RBC QN AUTO: 28.1 PG (ref 26.6–33)
MCHC RBC AUTO-ENTMCNC: 32.9 G/DL (ref 31.5–35.7)
MCV RBC AUTO: 85.5 FL (ref 79–97)
MONOCYTES # BLD AUTO: 1.08 10*3/MM3 (ref 0.1–0.9)
MONOCYTES NFR BLD AUTO: 6.6 % (ref 5–12)
NEUTROPHILS NFR BLD AUTO: 14.13 10*3/MM3 (ref 1.7–7)
NEUTROPHILS NFR BLD AUTO: 85.6 % (ref 42.7–76)
NITRITE UR QL STRIP: NEGATIVE
NRBC BLD AUTO-RTO: 0 /100 WBC (ref 0–0.2)
PH UR STRIP.AUTO: 5.5 [PH] (ref 5–8)
PLATELET # BLD AUTO: 371 10*3/MM3 (ref 140–450)
PMV BLD AUTO: 8.4 FL (ref 6–12)
POTASSIUM SERPL-SCNC: 4.2 MMOL/L (ref 3.5–5.2)
PROCALCITONIN SERPL-MCNC: 0.08 NG/ML (ref 0–0.25)
PROT SERPL-MCNC: 8.6 G/DL (ref 6–8.5)
PROT UR QL STRIP: ABNORMAL
PROTHROMBIN TIME: 13.6 SECONDS (ref 12.1–14.7)
RBC # BLD AUTO: 5.12 10*6/MM3 (ref 4.14–5.8)
RBC # UR STRIP: ABNORMAL /HPF
REF LAB TEST METHOD: ABNORMAL
SARS-COV-2 AG UPPER RESP QL IA.RAPID: NOT DETECTED
SODIUM SERPL-SCNC: 138 MMOL/L (ref 136–145)
SP GR UR STRIP: >1.03 (ref 1–1.03)
SQUAMOUS #/AREA URNS HPF: ABNORMAL /HPF
TROPONIN T SERPL HS-MCNC: 6 NG/L
UROBILINOGEN UR QL STRIP: ABNORMAL
WBC # UR STRIP: ABNORMAL /HPF
WBC NRBC COR # BLD: 16.48 10*3/MM3 (ref 3.4–10.8)

## 2023-06-15 PROCEDURE — 85730 THROMBOPLASTIN TIME PARTIAL: CPT | Performed by: STUDENT IN AN ORGANIZED HEALTH CARE EDUCATION/TRAINING PROGRAM

## 2023-06-15 PROCEDURE — 85025 COMPLETE CBC W/AUTO DIFF WBC: CPT | Performed by: INTERNAL MEDICINE

## 2023-06-15 PROCEDURE — 81001 URINALYSIS AUTO W/SCOPE: CPT | Performed by: STUDENT IN AN ORGANIZED HEALTH CARE EDUCATION/TRAINING PROGRAM

## 2023-06-15 PROCEDURE — 83605 ASSAY OF LACTIC ACID: CPT | Performed by: STUDENT IN AN ORGANIZED HEALTH CARE EDUCATION/TRAINING PROGRAM

## 2023-06-15 PROCEDURE — 99285 EMERGENCY DEPT VISIT HI MDM: CPT

## 2023-06-15 PROCEDURE — 84484 ASSAY OF TROPONIN QUANT: CPT | Performed by: STUDENT IN AN ORGANIZED HEALTH CARE EDUCATION/TRAINING PROGRAM

## 2023-06-15 PROCEDURE — 93005 ELECTROCARDIOGRAM TRACING: CPT | Performed by: STUDENT IN AN ORGANIZED HEALTH CARE EDUCATION/TRAINING PROGRAM

## 2023-06-15 PROCEDURE — 84145 PROCALCITONIN (PCT): CPT | Performed by: STUDENT IN AN ORGANIZED HEALTH CARE EDUCATION/TRAINING PROGRAM

## 2023-06-15 PROCEDURE — 71045 X-RAY EXAM CHEST 1 VIEW: CPT

## 2023-06-15 PROCEDURE — 85025 COMPLETE CBC W/AUTO DIFF WBC: CPT | Performed by: STUDENT IN AN ORGANIZED HEALTH CARE EDUCATION/TRAINING PROGRAM

## 2023-06-15 PROCEDURE — 86757 RICKETTSIA ANTIBODY: CPT | Performed by: INTERNAL MEDICINE

## 2023-06-15 PROCEDURE — 36415 COLL VENOUS BLD VENIPUNCTURE: CPT

## 2023-06-15 PROCEDURE — 85610 PROTHROMBIN TIME: CPT | Performed by: STUDENT IN AN ORGANIZED HEALTH CARE EDUCATION/TRAINING PROGRAM

## 2023-06-15 PROCEDURE — 71045 X-RAY EXAM CHEST 1 VIEW: CPT | Performed by: RADIOLOGY

## 2023-06-15 PROCEDURE — 80053 COMPREHEN METABOLIC PANEL: CPT | Performed by: STUDENT IN AN ORGANIZED HEALTH CARE EDUCATION/TRAINING PROGRAM

## 2023-06-15 PROCEDURE — 80053 COMPREHEN METABOLIC PANEL: CPT | Performed by: INTERNAL MEDICINE

## 2023-06-15 PROCEDURE — 87040 BLOOD CULTURE FOR BACTERIA: CPT | Performed by: STUDENT IN AN ORGANIZED HEALTH CARE EDUCATION/TRAINING PROGRAM

## 2023-06-15 PROCEDURE — 86618 LYME DISEASE ANTIBODY: CPT | Performed by: INTERNAL MEDICINE

## 2023-06-15 RX ORDER — KETOROLAC TROMETHAMINE 30 MG/ML
30 INJECTION, SOLUTION INTRAMUSCULAR; INTRAVENOUS ONCE
Status: COMPLETED | OUTPATIENT
Start: 2023-06-15 | End: 2023-06-16

## 2023-06-15 RX ORDER — SODIUM CHLORIDE 0.9 % (FLUSH) 0.9 %
10 SYRINGE (ML) INJECTION AS NEEDED
Status: DISCONTINUED | OUTPATIENT
Start: 2023-06-15 | End: 2023-06-18 | Stop reason: HOSPADM

## 2023-06-15 RX ORDER — DOXYCYCLINE HYCLATE 100 MG/1
100 CAPSULE ORAL 2 TIMES DAILY
Qty: 20 CAPSULE | Refills: 0 | Status: SHIPPED | OUTPATIENT
Start: 2023-06-15 | End: 2023-06-16

## 2023-06-15 RX ORDER — ACETAMINOPHEN 500 MG
1000 TABLET ORAL ONCE
Status: COMPLETED | OUTPATIENT
Start: 2023-06-15 | End: 2023-06-16

## 2023-06-15 RX ORDER — DEXAMETHASONE SODIUM PHOSPHATE 10 MG/ML
5 INJECTION, SOLUTION INTRAMUSCULAR; INTRAVENOUS ONCE
Status: COMPLETED | OUTPATIENT
Start: 2023-06-15 | End: 2023-06-16

## 2023-06-15 RX ORDER — ACETAMINOPHEN 500 MG
500 TABLET ORAL EVERY 6 HOURS PRN
COMMUNITY
End: 2023-06-18 | Stop reason: HOSPADM

## 2023-06-15 NOTE — PROGRESS NOTES
Venipuncture Blood Specimen Collection  Venipuncture performed in Right Arm by Julianna Wallis RN with good hemostasis. Patient tolerated the procedure well without complications.   06/15/23   Julianna Wallis RN

## 2023-06-15 NOTE — PROGRESS NOTES
Patient Name: Rupert Bean Today's Date: 6/15/2023   Patient MRN / CSN: [id number] / [id number] Date of Encounter: 6/15/2023   Patient Age / : 33 y.o. / 1990 Encounter Provider: Carol Boswell DO   Referring Physician: No ref. provider found          Rupert is a 33 y.o. male who is being seen today for Neck Pain, Chills, and Fatigue          History of Present Illness        Rupert presents today for an acute visit with complaints of feeling poorly over the past 1 week.  He reports waking up a week ago with neck pain and stiffness on the right side.  At first, he thought he slept wrong creating this pain.  He had been taking ibuprofen with some relief but his symptoms worsened a couple days ago despite taking ibuprofen.  He noticed last night developing chills and fatigue.  He did not check his temperature but felt feverish.  He is continue to have the symptoms worsen today.  He denies headaches, coughing, congestion, arthralgias, chest pain, nausea/vomiting, or rash.  He has noted some mild shortness of air, especially last night.  He denies any vision changes or photophobia.  He denies any blood in urine or stool.  He denies any known exposure to recent illness.  He denies any known tick bites but reports that he does hike frequently.  No one else in his family is ill at this time.    Allergies include:Patient has no known allergies.  Current Outpatient Medications   Medication Sig Dispense Refill   • acetaminophen (TYLENOL) 500 MG tablet Take 1 tablet by mouth Every 6 (Six) Hours As Needed for Mild Pain.     • ibuprofen (ADVIL,MOTRIN) 200 MG tablet Take 4 tablets by mouth Every 6 (Six) Hours As Needed for Mild Pain.     • doxycycline (VIBRAMYCIN) 100 MG capsule Take 1 capsule by mouth 2 (Two) Times a Day. 20 capsule 0     No current facility-administered medications for this visit.     Past Medical History:   Diagnosis Date   • Broken leg    • Bronchitis    • History of ear infections      Family  History   Problem Relation Age of Onset   • Cancer Mother    • Hypertension Mother    • Cancer Father    • Anuerysm Father    • Diabetes Brother    • Diabetes Maternal Grandfather    • Heart disease Maternal Grandfather      Past Surgical History:   Procedure Laterality Date   • FRACTURE SURGERY Right 1994     Social History     Substance and Sexual Activity   Alcohol Use Yes   • Alcohol/week: 1.0 standard drink   • Types: 1 Cans of beer per week    Comment: not weekly, once a month     Social History     Tobacco Use   Smoking Status Never   Smokeless Tobacco Never     Social History     Substance and Sexual Activity   Drug Use No     Review of Systems   Constitutional: Positive for chills and fatigue.        Patient has not checked temp at home.    HENT: Negative for congestion, ear pain and sore throat.    Eyes: Negative for photophobia and visual disturbance.   Respiratory: Positive for shortness of breath. Negative for cough and wheezing.         A little shortness of air last night.    Cardiovascular: Negative for chest pain.   Gastrointestinal: Negative for abdominal pain, blood in stool, diarrhea, nausea and vomiting.   Genitourinary: Negative for hematuria.   Musculoskeletal: Positive for neck pain and neck stiffness. Negative for arthralgias.   Skin:        Unaware of any tick bites.    Neurological: Negative for light-headedness and headaches.   Psychiatric/Behavioral: Negative for behavioral problems and confusion.        Depression Assessment Review:  PHQ-9 Total Score:    Vital Signs & Measurements Taken This Encounter  /82 (BP Location: Left arm, Patient Position: Sitting, Cuff Size: Adult)   Pulse 116   Temp 98.7 °F (37.1 °C) (Temporal)   Wt 112 kg (248 lb)   SpO2 98%   BMI 35.58 kg/m²    SpO2 Percentage    06/15/23 1508   SpO2: 98%               Physical Exam  Vitals reviewed.   Constitutional:       General: He is not in acute distress.     Appearance: He is not diaphoretic.   HENT:       Head: Normocephalic and atraumatic.      Comments: No maxillary or frontal sinus tenderness to palpation.     Ears:      Comments: TMs have erythema, bulge, and mucoid effusions bilaterally.     Mouth/Throat:      Mouth: Mucous membranes are moist.      Pharynx: Posterior oropharyngeal erythema present. No oropharyngeal exudate.   Eyes:      General: No scleral icterus.     Extraocular Movements: Extraocular movements intact.      Conjunctiva/sclera: Conjunctivae normal.      Pupils: Pupils are equal, round, and reactive to light.   Neck:      Comments: Patient is able to touch chin to chest but reports right, posterior neck pain when doing so.  Cardiovascular:      Rate and Rhythm: Regular rhythm. Tachycardia present.   Pulmonary:      Effort: Pulmonary effort is normal. No respiratory distress.      Breath sounds: Normal breath sounds. No wheezing or rhonchi.   Abdominal:      Palpations: Abdomen is soft.      Tenderness: There is no abdominal tenderness. There is no guarding or rebound.   Musculoskeletal:         General: No swelling.      Cervical back: Neck supple. Tenderness present.   Lymphadenopathy:      Cervical: Cervical adenopathy present.   Skin:     General: Skin is warm and dry.      Coloration: Skin is not jaundiced.      Findings: No rash.   Neurological:      General: No focal deficit present.      Mental Status: He is alert.      Gait: Gait normal.   Psychiatric:         Mood and Affect: Mood normal.         Behavior: Behavior normal.         Thought Content: Thought content normal.         Judgment: Judgment normal.              Assessment & Plan  Patient Active Problem List   Diagnosis   • DALLIN on CPAP   • Health care maintenance   • Scrotal pain   • Dysuria   • Lesion of skin of nose   • Acute prostatitis       ICD-10-CM ICD-9-CM   1. URI, acute  J06.9 465.9   2. Tachycardia  R00.0 785.0   3. Fever, unspecified fever cause  R50.9 780.60     Orders Placed This Encounter   Procedures   •  Comprehensive Metabolic Panel     Order Specific Question:   Release to patient     Answer:   Routine Release   • Lyme Disease Total Antibody With Reflex to Immunoassay     Order Specific Question:   Release to patient     Answer:   Routine Release   • [place] SF (IgG / M)     Order Specific Question:   Release to patient     Answer:   Routine Release   • CBC Auto Differential   • POCT SARS-CoV-2 Antigen MOLLY + Flu     Order Specific Question:   Release to patient     Answer:   Routine Release   • ECG 12 Lead     Order Specific Question:   Reason for Exam:     Answer:   Tachycardia   • CBC & Differential     Order Specific Question:   Manual Differential     Answer:   No       Meds Ordered During Visit:  New Medications Ordered This Visit   Medications   • doxycycline (VIBRAMYCIN) 100 MG capsule     Sig: Take 1 capsule by mouth 2 (Two) Times a Day.     Dispense:  20 capsule     Refill:  0     ECG in office today showed sinus tachycardia, rate at 116 bpm, no axis deviation, no acute ST findings.  Compared to previous ECG done 9/14/2022, sinus tachycardia is now present.    Patient was negative for COVID and flu on today's evaluation.  I expressed my concerns of possible meningitis given patient's neck stiffness.  I discussed emergency department evaluation today.  Patient declines this at this time due to cost but agrees to consider going to the emergency department if symptoms worsen or do not improve.  I recommended starting doxycycline twice daily.  I encouraged him to alternate Tylenol and Motrin as needed for fever or body aches.  We will check labs, including CBC, CMP, Lyme and [place] spotted fever testing today as above.  I have urged patient to go to the emergency department if symptoms worsen or do not improve.      Return if symptoms worsen or fail to improve, for Next scheduled follow up.          Referring Provider (if known): No ref. provider found      This document has been electronically  signed by Carol Boswell DO  Jessica 15, 2023 16:14 EDT    Carol Boswell DO, FACOI  [address] [place] [address]  [phone] (office)    Part of this note may be an electronic transcription/translation of spoken language to printed text using the Dragon Dictation System.

## 2023-06-16 ENCOUNTER — APPOINTMENT (OUTPATIENT)
Dept: CT IMAGING | Facility: HOSPITAL | Age: 33
DRG: 872 | End: 2023-06-16
Payer: COMMERCIAL

## 2023-06-16 PROBLEM — A87.9 VIRAL MENINGITIS: Status: ACTIVE | Noted: 2023-06-16

## 2023-06-16 LAB
ALBUMIN SERPL-MCNC: 4.7 G/DL (ref 3.5–5.2)
ALBUMIN/GLOB SERPL: 1.3 G/DL
ALP SERPL-CCNC: 77 U/L (ref 39–117)
ALT SERPL W P-5'-P-CCNC: 22 U/L (ref 1–41)
ANION GAP SERPL CALCULATED.3IONS-SCNC: 9 MMOL/L (ref 5–15)
APPEARANCE CSF: CLEAR
AST SERPL-CCNC: 16 U/L (ref 1–40)
B PARAPERT DNA SPEC QL NAA+PROBE: NOT DETECTED
B PERT DNA SPEC QL NAA+PROBE: NOT DETECTED
BASOPHILS # BLD AUTO: 0.06 10*3/MM3 (ref 0–0.2)
BASOPHILS NFR BLD AUTO: 0.5 % (ref 0–1.5)
BILIRUB SERPL-MCNC: 0.3 MG/DL (ref 0–1.2)
BUN SERPL-MCNC: 18 MG/DL (ref 6–20)
BUN/CREAT SERPL: 18.8 (ref 7–25)
C GATTII+NEOFOR DNA CSF QL NAA+NON-PROBE: NOT DETECTED
C PNEUM DNA NPH QL NAA+NON-PROBE: NOT DETECTED
CALCIUM SPEC-SCNC: 9.5 MG/DL (ref 8.6–10.5)
CHLORIDE SERPL-SCNC: 103 MMOL/L (ref 98–107)
CK SERPL-CCNC: 160 U/L (ref 20–200)
CMV DNA CSF QL NAA+PROBE: NOT DETECTED
CO2 SERPL-SCNC: 26 MMOL/L (ref 22–29)
COLOR CSF: COLORLESS
CREAT SERPL-MCNC: 0.96 MG/DL (ref 0.76–1.27)
CRP SERPL-MCNC: 10.02 MG/DL (ref 0–0.5)
DEPRECATED RDW RBC AUTO: 37.2 FL (ref 37–54)
E COLI K1 DNA CSF QL NAA+NON-PROBE: NOT DETECTED
EGFRCR SERPLBLD CKD-EPI 2021: 107.7 ML/MIN/1.73
EOSINOPHIL # BLD AUTO: 0.09 10*3/MM3 (ref 0–0.4)
EOSINOPHIL NFR BLD AUTO: 0.7 % (ref 0.3–6.2)
ERYTHROCYTE [DISTWIDTH] IN BLOOD BY AUTOMATED COUNT: 12.5 % (ref 12.3–15.4)
ERYTHROCYTE [SEDIMENTATION RATE] IN BLOOD: 33 MM/HR (ref 0–15)
EV RNA CSF QL NAA+PROBE: NOT DETECTED
FERRITIN SERPL-MCNC: 149.9 NG/ML (ref 30–400)
FLUAV SUBTYP SPEC NAA+PROBE: NOT DETECTED
FLUBV RNA ISLT QL NAA+PROBE: NOT DETECTED
GEN 5 2HR TROPONIN T REFLEX: 6 NG/L
GLOBULIN UR ELPH-MCNC: 3.7 GM/DL
GLUCOSE CSF-MCNC: 71 MG/DL (ref 40–70)
GLUCOSE SERPL-MCNC: 84 MG/DL (ref 65–99)
GP B STREP DNA SPEC QL NAA+PROBE: NOT DETECTED
HADV DNA SPEC NAA+PROBE: NOT DETECTED
HAEM INFLU SEROTYP DNA SPEC NAA+PROBE: NOT DETECTED
HCOV 229E RNA SPEC QL NAA+PROBE: NOT DETECTED
HCOV HKU1 RNA SPEC QL NAA+PROBE: NOT DETECTED
HCOV NL63 RNA SPEC QL NAA+PROBE: NOT DETECTED
HCOV OC43 RNA SPEC QL NAA+PROBE: NOT DETECTED
HCT VFR BLD AUTO: 42.3 % (ref 37.5–51)
HGB BLD-MCNC: 14.1 G/DL (ref 13–17.7)
HHV6 DNA CSF QL NAA+PROBE: NOT DETECTED
HMPV RNA NPH QL NAA+NON-PROBE: NOT DETECTED
HOLD SPECIMEN: NORMAL
HOLD SPECIMEN: NORMAL
HPIV1 RNA ISLT QL NAA+PROBE: NOT DETECTED
HPIV2 RNA SPEC QL NAA+PROBE: NOT DETECTED
HPIV3 RNA NPH QL NAA+PROBE: NOT DETECTED
HPIV4 P GENE NPH QL NAA+PROBE: NOT DETECTED
HSV1 DNA CSF QL NAA+PROBE: NOT DETECTED
HSV2 DNA CSF QL NAA+PROBE: NOT DETECTED
IMM GRANULOCYTES # BLD AUTO: 0.03 10*3/MM3 (ref 0–0.05)
IMM GRANULOCYTES NFR BLD AUTO: 0.2 % (ref 0–0.5)
L MONOCYTOG RRNA SPEC QL PROBE: NOT DETECTED
LYMPHOCYTES # BLD AUTO: 1.4 10*3/MM3 (ref 0.7–3.1)
LYMPHOCYTES NFR BLD AUTO: 10.8 % (ref 19.6–45.3)
M PNEUMO IGG SER IA-ACNC: NOT DETECTED
MCH RBC QN AUTO: 27.9 PG (ref 26.6–33)
MCHC RBC AUTO-ENTMCNC: 33.3 G/DL (ref 31.5–35.7)
MCV RBC AUTO: 83.6 FL (ref 79–97)
MONOCYTES # BLD AUTO: 1.27 10*3/MM3 (ref 0.1–0.9)
MONOCYTES NFR BLD AUTO: 9.8 % (ref 5–12)
N MEN DNA SPEC QL NAA+PROBE: NOT DETECTED
NEUTROPHILS NFR BLD AUTO: 10.14 10*3/MM3 (ref 1.7–7)
NEUTROPHILS NFR BLD AUTO: 78 % (ref 42.7–76)
NRBC BLD AUTO-RTO: 0 /100 WBC (ref 0–0.2)
NUC CELL # CSF MANUAL: 5 /MM3 (ref 0–5)
PARECHOVIRUS A RNA CSF QL NAA+NON-PROBE: NOT DETECTED
PLATELET # BLD AUTO: 381 10*3/MM3 (ref 140–450)
PMV BLD AUTO: 8.8 FL (ref 6–12)
POTASSIUM SERPL-SCNC: 4.2 MMOL/L (ref 3.5–5.2)
PROT CSF-MCNC: 86.5 MG/DL (ref 15–45)
PROT SERPL-MCNC: 8.4 G/DL (ref 6–8.5)
QT INTERVAL: 338 MS
QTC INTERVAL: 438 MS
RBC # BLD AUTO: 5.06 10*6/MM3 (ref 4.14–5.8)
RBC # CSF MANUAL: 0 /MM3 (ref 0–0)
RHINOVIRUS RNA SPEC NAA+PROBE: NOT DETECTED
RSV RNA NPH QL NAA+NON-PROBE: NOT DETECTED
S PNEUM DNA CSF QL NAA+NON-PROBE: NOT DETECTED
SARS-COV-2 RNA NPH QL NAA+NON-PROBE: NOT DETECTED
SODIUM SERPL-SCNC: 138 MMOL/L (ref 136–145)
SPECIMEN VOL CSF: 2 ML
TROPONIN T DELTA: 0 NG/L
TUBE # CSF: 4
VZV DNA CSF QL NAA+PROBE: NOT DETECTED
WBC NRBC COR # BLD: 12.99 10*3/MM3 (ref 3.4–10.8)
WHOLE BLOOD HOLD COAG: NORMAL
WHOLE BLOOD HOLD SPECIMEN: NORMAL

## 2023-06-16 PROCEDURE — 009U3ZX DRAINAGE OF SPINAL CANAL, PERCUTANEOUS APPROACH, DIAGNOSTIC: ICD-10-PCS | Performed by: EMERGENCY MEDICINE

## 2023-06-16 PROCEDURE — 82728 ASSAY OF FERRITIN: CPT | Performed by: STUDENT IN AN ORGANIZED HEALTH CARE EDUCATION/TRAINING PROGRAM

## 2023-06-16 PROCEDURE — 86431 RHEUMATOID FACTOR QUANT: CPT

## 2023-06-16 PROCEDURE — 82945 GLUCOSE OTHER FLUID: CPT | Performed by: EMERGENCY MEDICINE

## 2023-06-16 PROCEDURE — 86618 LYME DISEASE ANTIBODY: CPT | Performed by: STUDENT IN AN ORGANIZED HEALTH CARE EDUCATION/TRAINING PROGRAM

## 2023-06-16 PROCEDURE — 99223 1ST HOSP IP/OBS HIGH 75: CPT

## 2023-06-16 PROCEDURE — 87468 ANAPLSMA PHGCYTOPHLM AMP PRB: CPT | Performed by: STUDENT IN AN ORGANIZED HEALTH CARE EDUCATION/TRAINING PROGRAM

## 2023-06-16 PROCEDURE — 87484 EHRLICHA CHAFFEENSIS AMP PRB: CPT | Performed by: STUDENT IN AN ORGANIZED HEALTH CARE EDUCATION/TRAINING PROGRAM

## 2023-06-16 PROCEDURE — 87070 CULTURE OTHR SPECIMN AEROBIC: CPT | Performed by: EMERGENCY MEDICINE

## 2023-06-16 PROCEDURE — 25010000002 PROPOFOL 10 MG/ML EMULSION: Performed by: EMERGENCY MEDICINE

## 2023-06-16 PROCEDURE — 25010000002 DEXAMETHASONE SODIUM PHOSPHATE 10 MG/ML SOLUTION: Performed by: STUDENT IN AN ORGANIZED HEALTH CARE EDUCATION/TRAINING PROGRAM

## 2023-06-16 PROCEDURE — 25010000002 DIPHENHYDRAMINE PER 50 MG: Performed by: STUDENT IN AN ORGANIZED HEALTH CARE EDUCATION/TRAINING PROGRAM

## 2023-06-16 PROCEDURE — 25010000002 KETOROLAC TROMETHAMINE PER 15 MG: Performed by: STUDENT IN AN ORGANIZED HEALTH CARE EDUCATION/TRAINING PROGRAM

## 2023-06-16 PROCEDURE — 85652 RBC SED RATE AUTOMATED: CPT | Performed by: STUDENT IN AN ORGANIZED HEALTH CARE EDUCATION/TRAINING PROGRAM

## 2023-06-16 PROCEDURE — 84157 ASSAY OF PROTEIN OTHER: CPT | Performed by: EMERGENCY MEDICINE

## 2023-06-16 PROCEDURE — 86140 C-REACTIVE PROTEIN: CPT | Performed by: STUDENT IN AN ORGANIZED HEALTH CARE EDUCATION/TRAINING PROGRAM

## 2023-06-16 PROCEDURE — 87483 CNS DNA AMP PROBE TYPE 12-25: CPT | Performed by: EMERGENCY MEDICINE

## 2023-06-16 PROCEDURE — 0202U NFCT DS 22 TRGT SARS-COV-2: CPT | Performed by: EMERGENCY MEDICINE

## 2023-06-16 PROCEDURE — 25010000002 ENOXAPARIN PER 10 MG: Performed by: STUDENT IN AN ORGANIZED HEALTH CARE EDUCATION/TRAINING PROGRAM

## 2023-06-16 PROCEDURE — 87798 DETECT AGENT NOS DNA AMP: CPT | Performed by: STUDENT IN AN ORGANIZED HEALTH CARE EDUCATION/TRAINING PROGRAM

## 2023-06-16 PROCEDURE — 89050 BODY FLUID CELL COUNT: CPT | Performed by: EMERGENCY MEDICINE

## 2023-06-16 PROCEDURE — 25010000002 METOCLOPRAMIDE PER 10 MG: Performed by: STUDENT IN AN ORGANIZED HEALTH CARE EDUCATION/TRAINING PROGRAM

## 2023-06-16 PROCEDURE — 82550 ASSAY OF CK (CPK): CPT

## 2023-06-16 PROCEDURE — 25010000002 CEFTRIAXONE PER 250 MG: Performed by: STUDENT IN AN ORGANIZED HEALTH CARE EDUCATION/TRAINING PROGRAM

## 2023-06-16 PROCEDURE — 84484 ASSAY OF TROPONIN QUANT: CPT | Performed by: STUDENT IN AN ORGANIZED HEALTH CARE EDUCATION/TRAINING PROGRAM

## 2023-06-16 PROCEDURE — 25010000002 VANCOMYCIN 5 G RECONSTITUTED SOLUTION: Performed by: STUDENT IN AN ORGANIZED HEALTH CARE EDUCATION/TRAINING PROGRAM

## 2023-06-16 PROCEDURE — 93010 ELECTROCARDIOGRAM REPORT: CPT | Performed by: SPECIALIST

## 2023-06-16 PROCEDURE — 87205 SMEAR GRAM STAIN: CPT | Performed by: EMERGENCY MEDICINE

## 2023-06-16 PROCEDURE — 86038 ANTINUCLEAR ANTIBODIES: CPT

## 2023-06-16 PROCEDURE — 70450 CT HEAD/BRAIN W/O DYE: CPT

## 2023-06-16 PROCEDURE — 70450 CT HEAD/BRAIN W/O DYE: CPT | Performed by: RADIOLOGY

## 2023-06-16 PROCEDURE — 25010000002 PIPERACILLIN SOD-TAZOBACTAM PER 1 G: Performed by: STUDENT IN AN ORGANIZED HEALTH CARE EDUCATION/TRAINING PROGRAM

## 2023-06-16 RX ORDER — IBUPROFEN 600 MG/1
600 TABLET ORAL ONCE
Status: COMPLETED | OUTPATIENT
Start: 2023-06-16 | End: 2023-06-16

## 2023-06-16 RX ORDER — PROPOFOL 10 MG/ML
VIAL (ML) INTRAVENOUS
Status: COMPLETED | OUTPATIENT
Start: 2023-06-16 | End: 2023-06-16

## 2023-06-16 RX ORDER — AMOXICILLIN 250 MG
2 CAPSULE ORAL 2 TIMES DAILY
Status: DISCONTINUED | OUTPATIENT
Start: 2023-06-16 | End: 2023-06-18 | Stop reason: HOSPADM

## 2023-06-16 RX ORDER — ACETAMINOPHEN 500 MG
500 TABLET ORAL EVERY 6 HOURS PRN
Status: CANCELLED | OUTPATIENT
Start: 2023-06-16

## 2023-06-16 RX ORDER — ASPIRIN 325 MG
325 TABLET, DELAYED RELEASE (ENTERIC COATED) ORAL EVERY 6 HOURS PRN
COMMUNITY
End: 2023-06-18 | Stop reason: HOSPADM

## 2023-06-16 RX ORDER — SODIUM CHLORIDE 0.9 % (FLUSH) 0.9 %
10 SYRINGE (ML) INJECTION AS NEEDED
Status: DISCONTINUED | OUTPATIENT
Start: 2023-06-16 | End: 2023-06-18 | Stop reason: HOSPADM

## 2023-06-16 RX ORDER — KETOROLAC TROMETHAMINE 30 MG/ML
30 INJECTION, SOLUTION INTRAMUSCULAR; INTRAVENOUS EVERY 6 HOURS PRN
Status: DISCONTINUED | OUTPATIENT
Start: 2023-06-16 | End: 2023-06-18 | Stop reason: HOSPADM

## 2023-06-16 RX ORDER — NAPROXEN SODIUM 220 MG
220 TABLET ORAL 2 TIMES DAILY PRN
COMMUNITY
End: 2023-06-18 | Stop reason: HOSPADM

## 2023-06-16 RX ORDER — BISACODYL 5 MG/1
5 TABLET, DELAYED RELEASE ORAL DAILY PRN
Status: DISCONTINUED | OUTPATIENT
Start: 2023-06-16 | End: 2023-06-18 | Stop reason: HOSPADM

## 2023-06-16 RX ORDER — SODIUM CHLORIDE 9 MG/ML
40 INJECTION, SOLUTION INTRAVENOUS AS NEEDED
Status: DISCONTINUED | OUTPATIENT
Start: 2023-06-16 | End: 2023-06-18 | Stop reason: HOSPADM

## 2023-06-16 RX ORDER — NAPROXEN 250 MG/1
250 TABLET ORAL 2 TIMES DAILY PRN
Status: CANCELLED | OUTPATIENT
Start: 2023-06-16

## 2023-06-16 RX ORDER — ACETAMINOPHEN 325 MG/1
650 TABLET ORAL EVERY 4 HOURS PRN
Status: DISCONTINUED | OUTPATIENT
Start: 2023-06-16 | End: 2023-06-18 | Stop reason: HOSPADM

## 2023-06-16 RX ORDER — DOXYCYCLINE 100 MG/1
100 CAPSULE ORAL EVERY 12 HOURS SCHEDULED
Status: DISCONTINUED | OUTPATIENT
Start: 2023-06-16 | End: 2023-06-18 | Stop reason: HOSPADM

## 2023-06-16 RX ORDER — PROPOFOL 10 MG/ML
200 VIAL (ML) INTRAVENOUS ONCE
Status: COMPLETED | OUTPATIENT
Start: 2023-06-16 | End: 2023-06-16

## 2023-06-16 RX ORDER — NITROGLYCERIN 0.4 MG/1
0.4 TABLET SUBLINGUAL
Status: DISCONTINUED | OUTPATIENT
Start: 2023-06-16 | End: 2023-06-18 | Stop reason: HOSPADM

## 2023-06-16 RX ORDER — ASPIRIN 325 MG
325 TABLET, DELAYED RELEASE (ENTERIC COATED) ORAL EVERY 6 HOURS PRN
Status: CANCELLED | OUTPATIENT
Start: 2023-06-16

## 2023-06-16 RX ORDER — IBUPROFEN 400 MG/1
800 TABLET ORAL EVERY 6 HOURS PRN
Status: CANCELLED | OUTPATIENT
Start: 2023-06-16

## 2023-06-16 RX ORDER — METOCLOPRAMIDE HYDROCHLORIDE 5 MG/ML
5 INJECTION INTRAMUSCULAR; INTRAVENOUS ONCE
Status: COMPLETED | OUTPATIENT
Start: 2023-06-16 | End: 2023-06-16

## 2023-06-16 RX ORDER — SODIUM CHLORIDE 0.9 % (FLUSH) 0.9 %
10 SYRINGE (ML) INJECTION EVERY 12 HOURS SCHEDULED
Status: DISCONTINUED | OUTPATIENT
Start: 2023-06-16 | End: 2023-06-18 | Stop reason: HOSPADM

## 2023-06-16 RX ORDER — ENOXAPARIN SODIUM 100 MG/ML
40 INJECTION SUBCUTANEOUS NIGHTLY
Status: DISCONTINUED | OUTPATIENT
Start: 2023-06-16 | End: 2023-06-18 | Stop reason: HOSPADM

## 2023-06-16 RX ORDER — IBUPROFEN 800 MG/1
800 TABLET ORAL EVERY 4 HOURS PRN
Status: DISCONTINUED | OUTPATIENT
Start: 2023-06-16 | End: 2023-06-16

## 2023-06-16 RX ORDER — POLYETHYLENE GLYCOL 3350 17 G/17G
17 POWDER, FOR SOLUTION ORAL DAILY PRN
Status: DISCONTINUED | OUTPATIENT
Start: 2023-06-16 | End: 2023-06-18 | Stop reason: HOSPADM

## 2023-06-16 RX ORDER — BISACODYL 10 MG
10 SUPPOSITORY, RECTAL RECTAL DAILY PRN
Status: DISCONTINUED | OUTPATIENT
Start: 2023-06-16 | End: 2023-06-18 | Stop reason: HOSPADM

## 2023-06-16 RX ORDER — IBUPROFEN 800 MG/1
800 TABLET ORAL EVERY 6 HOURS PRN
Status: CANCELLED | OUTPATIENT
Start: 2023-06-16

## 2023-06-16 RX ORDER — DOXYCYCLINE 100 MG/1
100 CAPSULE ORAL EVERY 12 HOURS SCHEDULED
Status: CANCELLED | OUTPATIENT
Start: 2023-06-16 | End: 2023-06-20

## 2023-06-16 RX ORDER — DOXYCYCLINE HYCLATE 100 MG/1
100 CAPSULE ORAL 2 TIMES DAILY
COMMUNITY
Start: 2023-06-15 | End: 2023-06-18 | Stop reason: HOSPADM

## 2023-06-16 RX ORDER — DIPHENHYDRAMINE HYDROCHLORIDE 50 MG/ML
12.5 INJECTION INTRAMUSCULAR; INTRAVENOUS ONCE
Status: COMPLETED | OUTPATIENT
Start: 2023-06-16 | End: 2023-06-16

## 2023-06-16 RX ORDER — ONDANSETRON 2 MG/ML
4 INJECTION INTRAMUSCULAR; INTRAVENOUS EVERY 6 HOURS PRN
Status: DISCONTINUED | OUTPATIENT
Start: 2023-06-16 | End: 2023-06-18 | Stop reason: HOSPADM

## 2023-06-16 RX ORDER — SODIUM CHLORIDE, SODIUM LACTATE, POTASSIUM CHLORIDE, CALCIUM CHLORIDE 600; 310; 30; 20 MG/100ML; MG/100ML; MG/100ML; MG/100ML
100 INJECTION, SOLUTION INTRAVENOUS CONTINUOUS
Status: DISCONTINUED | OUTPATIENT
Start: 2023-06-16 | End: 2023-06-18 | Stop reason: HOSPADM

## 2023-06-16 RX ADMIN — ACETAMINOPHEN 650 MG: 325 TABLET ORAL at 18:02

## 2023-06-16 RX ADMIN — KETOROLAC TROMETHAMINE 30 MG: 30 INJECTION, SOLUTION INTRAMUSCULAR; INTRAVENOUS at 00:18

## 2023-06-16 RX ADMIN — DOXYCYCLINE 100 MG: 100 CAPSULE ORAL at 21:30

## 2023-06-16 RX ADMIN — SODIUM CHLORIDE 2190 ML: 9 INJECTION, SOLUTION INTRAVENOUS at 00:21

## 2023-06-16 RX ADMIN — SODIUM CHLORIDE, POTASSIUM CHLORIDE, SODIUM LACTATE AND CALCIUM CHLORIDE 100 ML/HR: 600; 310; 30; 20 INJECTION, SOLUTION INTRAVENOUS at 23:25

## 2023-06-16 RX ADMIN — PROPOFOL 200 MG: 10 INJECTION, EMULSION INTRAVENOUS at 05:51

## 2023-06-16 RX ADMIN — DOCUSATE SODIUM 50 MG AND SENNOSIDES 8.6 MG 2 TABLET: 8.6; 5 TABLET, FILM COATED ORAL at 14:37

## 2023-06-16 RX ADMIN — ENOXAPARIN SODIUM 40 MG: 40 INJECTION SUBCUTANEOUS at 21:30

## 2023-06-16 RX ADMIN — SODIUM CHLORIDE 2 G: 9 INJECTION, SOLUTION INTRAVENOUS at 11:03

## 2023-06-16 RX ADMIN — SODIUM CHLORIDE, POTASSIUM CHLORIDE, SODIUM LACTATE AND CALCIUM CHLORIDE 100 ML/HR: 600; 310; 30; 20 INJECTION, SOLUTION INTRAVENOUS at 14:24

## 2023-06-16 RX ADMIN — VANCOMYCIN HYDROCHLORIDE 1500 MG: 5 INJECTION, POWDER, LYOPHILIZED, FOR SOLUTION INTRAVENOUS at 23:29

## 2023-06-16 RX ADMIN — DEXAMETHASONE SODIUM PHOSPHATE 5 MG: 10 INJECTION INTRAMUSCULAR; INTRAVENOUS at 00:20

## 2023-06-16 RX ADMIN — IBUPROFEN 800 MG: 400 TABLET, FILM COATED ORAL at 09:36

## 2023-06-16 RX ADMIN — VANCOMYCIN HYDROCHLORIDE 2500 MG: 5 INJECTION, POWDER, LYOPHILIZED, FOR SOLUTION INTRAVENOUS at 11:54

## 2023-06-16 RX ADMIN — ACETAMINOPHEN 650 MG: 325 TABLET ORAL at 12:48

## 2023-06-16 RX ADMIN — METOCLOPRAMIDE 5 MG: 5 INJECTION, SOLUTION INTRAMUSCULAR; INTRAVENOUS at 00:20

## 2023-06-16 RX ADMIN — PROPOFOL 60 MG: 10 INJECTION, EMULSION INTRAVENOUS at 05:36

## 2023-06-16 RX ADMIN — KETOROLAC TROMETHAMINE 30 MG: 30 INJECTION, SOLUTION INTRAMUSCULAR; INTRAVENOUS at 17:33

## 2023-06-16 RX ADMIN — ACETAMINOPHEN 1000 MG: 500 TABLET ORAL at 00:26

## 2023-06-16 RX ADMIN — Medication 10 ML: at 21:31

## 2023-06-16 RX ADMIN — PROPOFOL 20 MG: 10 INJECTION, EMULSION INTRAVENOUS at 05:37

## 2023-06-16 RX ADMIN — Medication 10 ML: at 14:24

## 2023-06-16 RX ADMIN — DIPHENHYDRAMINE HYDROCHLORIDE 12.5 MG: 50 INJECTION INTRAMUSCULAR; INTRAVENOUS at 00:34

## 2023-06-16 RX ADMIN — KETOROLAC TROMETHAMINE 30 MG: 30 INJECTION, SOLUTION INTRAMUSCULAR; INTRAVENOUS at 11:11

## 2023-06-16 RX ADMIN — IBUPROFEN 600 MG: 600 TABLET, FILM COATED ORAL at 23:19

## 2023-06-16 RX ADMIN — PIPERACILLIN SODIUM AND TAZOBACTAM SODIUM 3.38 G: 3; .375 INJECTION, POWDER, LYOPHILIZED, FOR SOLUTION INTRAVENOUS at 01:32

## 2023-06-16 NOTE — ED NOTES
MEDICAL SCREENING:    Reason for Visit: Headache and neck pain subjective fever    Patient initially seen in triage.  The patient was advised further evaluation and diagnostic testing will be needed, some of the treatment and testing will be initiated in the lobby in order to begin the process.  The patient will be returned to the waiting area for the time being and possibly be re-assessed by a subsequent ED provider.  The patient will be brought back to the treatment area in as timely manner as possible.       Wally Taylor, DO  06/15/23 0357

## 2023-06-16 NOTE — PROGRESS NOTES
Pharmacokinetics Service Note:    Mr. Bean has been evaluated for vancomycin dosing to treat his possible CNS infection.  Based on his estimated normal renal function, demographics, and predicted pharmacokinetic parameters, will load with 2.5 gm followed by 1.5 gm q12hrs to target an AUC range of 500-600 mg/L*hr.  Will plan to obtain a level in the next 36 hours to determine if any adjustments are needed.    Thank you.  Yuliet Garcia, Pharm.D.  6/16/2023  11:08 EDT

## 2023-06-16 NOTE — H&P
[facility] Hospital Medicine Services  History & Physical    Patient Identification:  Name:  Rupert Bean  Age:  33 y.o.  Sex:  male  :  1990  MRN:  [id number]   Visit Number:  [id number]  Admit Date: 6/15/2023   Primary Care Physician:  Carol Boswell DO    Subjective     Chief complaint: Neck pain/stiffness, fever, and headache    History of presenting illness:      Rupert Bean is a 33 y.o. male with past medical history significant for DALLIN on CPAP and obesity by BMI.  Patient presents to [facility] emergency department today for further evaluation of neck pain/stiffness, low grade fever, and headache. Symptoms have been ongoing for approximately 3 days with no precipitating event. No injury, fall, or known tick bites. Patient states that he has been hiking a lot, but has not noticed any ticks. He denies use of IV drugs or other illicit substances. Denies having any open wounds, sores, or rashes. Due to symptoms concerning for viral meningitis, patient underwent LP in the ED. Meningitis/encephalitis panel negative. CSF + for glucose and protein. CSF culture pending. Patient met SIRS/sepsis criteria with HR>90, WBCs>12, and C-RP elevation. Patient denies any other acute complaints such as chest pain, shortness of breath, cough, sputum production, abdominal pain, N/V/D, constipation, or dysuria. He also denies any peripheral numbness or tingling, vision changes/visual disturbance, dizziness, and syncope. He does report some mild fatigue on exertion. Neck pain/headache is made worse by movement. He states that ibuprofen/tylenol have helped to alleviate pain. He states that neck pain radiates to the right shoulder. HS Troponin T negative x2 and EKG was without evidence of acute ischemia.  He reports strong family history of Rheumatoid arthritis and mother at bedside states that she has it along with patient's brother who was recently diagnosed with RA at a young age (30s).  RF and PEYTON direct ordered as well. We will admit patient to [facility] for continued monitoring, evaluation, and treatment. Discussed with patient and his mother. They voiced agreement and understanding with no further questions or concerns at this time.     Upon arrival to the ED, vital signs were temperature 98.7, pulse 121, respirations 20, blood pressure 119/55 sitting, SPO2 saturation 96% on room air.  High-sensitivity troponin T negative x2.  CMP with glucose 115, total protein 8.6, otherwise unremarkable.  Lactate 1.2.  Procalcitonin 0.08.  PT 13.6, INR 0.99.  CBC with WBC 16.48, RDW 12.2, otherwise unremarkable.  Urinalysis with trace ketones, 2+ protein, 1+ bilirubin, 35 RBCs, 35 WBCs.  Respiratory panel negative.  Peripheral blood cultures x2 pending.  Meningitis/encephalitis panel PCR negative VZV negative.  Body fluid culture from LP pending.  Cerebrospinal fluid clear, Karalis.  71 glucose, 86.5 protein.  Chest x-ray with no acute process in the chest.  No lobar consolidation or edema.  No features of bronchial inflammation or hilar adenopathy.  No free air in the upper abdomen.  No fracture or foreign body.  CT of the head without contrast unremarkable.    Known Emergency Department medications received prior to my evaluation included 1 g oral Tylenol, 2 g IV Rocephin, 5 mg IV dexamethasone sodium phosphate, 12.5 mg IV Benadryl, 30 mg IV Toradol x2, 5 mg IV Reglan, 3.375 g IV Zosyn, 200 mg IV to prevent, 2190 mL normal saline bolus, 2500 mg IV vancomycin, and milligram ibuprofen. Room location at the time of my evaluation was 350B.  Discussed with admitting physician, Ananth Garvey DO.    ---------------------------------------------------------------------------------------------------------------------   Review of Systems   Constitutional:  Positive for chills, fatigue and fever.   HENT:  Negative for congestion, sore throat and trouble swallowing.    Respiratory:  Negative for cough,  shortness of breath and wheezing.    Cardiovascular:  Negative for chest pain, palpitations and leg swelling.   Gastrointestinal:  Negative for abdominal pain, constipation, diarrhea, nausea and vomiting.   Genitourinary:  Negative for difficulty urinating, flank pain, frequency and urgency.   Musculoskeletal:  Positive for arthralgias (right shoulder), neck pain and neck stiffness. Negative for back pain, gait problem and joint swelling.   Skin:  Negative for color change, rash and wound.   Neurological:  Positive for headaches. Negative for dizziness, tremors, seizures, syncope, facial asymmetry, speech difficulty, weakness, light-headedness and numbness.   Hematological:  Negative for adenopathy. Does not bruise/bleed easily.    ---------------------------------------------------------------------------------------------------------------------   Past Medical History:   Diagnosis Date    Broken leg     Bronchitis     History of ear infections      Past Surgical History:   Procedure Laterality Date    FRACTURE SURGERY Right 1994     Family History   Problem Relation Age of Onset    Cancer Mother     Hypertension Mother     Cancer Father     Anuerysm Father     Diabetes Brother     Diabetes Maternal Grandfather     Heart disease Maternal Grandfather      Social History     Socioeconomic History    Marital status: Single   Tobacco Use    Smoking status: Never    Smokeless tobacco: Never   Vaping Use    Vaping Use: Never used   Substance and Sexual Activity    Alcohol use: Yes     Alcohol/week: 1.0 standard drink     Types: 1 Cans of beer per week     Comment: not weekly, once a month    Drug use: No    Sexual activity: Not Currently     ---------------------------------------------------------------------------------------------------------------------   Allergies:  Patient has no known allergies.  ---------------------------------------------------------------------------------------------------------------------    Home medications:    Medications below are reported home medications pulling from within the system; at this time, these medications have not been reconciled unless otherwise specified and are in the verification process for further verifcation as current home medications.  Medications Prior to Admission   Medication Sig Dispense Refill Last Dose    acetaminophen (TYLENOL) 500 MG tablet Take 1 tablet by mouth Every 6 (Six) Hours As Needed for Mild Pain.   6/16/2023    ibuprofen (ADVIL,MOTRIN) 200 MG tablet Take 4 tablets by mouth Every 6 (Six) Hours As Needed for Mild Pain.   6/16/2023    naproxen sodium (ALEVE) 220 MG tablet Take 1 tablet by mouth 2 (Two) Times a Day As Needed for Mild Pain, Fever or Headache.   Past Week    aspirin 325 MG EC tablet Take 1 tablet by mouth Every 6 (Six) Hours As Needed for Mild Pain or Fever.   Unknown       Hospital Scheduled Meds:  cefTRIAXone, 2 g, Intravenous, Q24H  vancomycin, 2,500 mg, Intravenous, Once   Followed by  [START ON 6/17/2023] vancomycin, 1,500 mg, Intravenous, Q12H           Current listed hospital scheduled medications may not yet reflect those currently placed in orders that are signed and held awaiting patient's arrival to floor.   ---------------------------------------------------------------------------------------------------------------------     Objective     Vital Signs:  Temp:  [98.4 °F (36.9 °C)-99 °F (37.2 °C)] 98.6 °F (37 °C)  Heart Rate:  [] 98  Resp:  [18-20] 20  BP: ()/(55-82) 123/68      06/15/23  5287   Weight: 113 kg (250 lb)     Body mass index is 35.87 kg/m².  ---------------------------------------------------------------------------------------------------------------------       Physical Exam  Vitals reviewed.   Constitutional:       General: He is awake. He is not in acute distress.     Appearance: He is obese. He is not ill-appearing or diaphoretic.      Comments: Room air.   HENT:      Head: Normocephalic and atraumatic.       Mouth/Throat:      Mouth: Mucous membranes are moist.      Pharynx: Oropharynx is clear.   Eyes:      Extraocular Movements: Extraocular movements intact.      Pupils: Pupils are equal, round, and reactive to light.   Cardiovascular:      Rate and Rhythm: Normal rate and regular rhythm.      Pulses: Normal pulses.           Dorsalis pedis pulses are 2+ on the right side and 2+ on the left side.      Heart sounds: Normal heart sounds. No murmur heard.    No friction rub.   Pulmonary:      Effort: Pulmonary effort is normal. No accessory muscle usage, respiratory distress or retractions.      Breath sounds: No wheezing, rhonchi or rales.   Abdominal:      General: Bowel sounds are normal. There is no distension.      Palpations: Abdomen is soft.      Tenderness: There is no abdominal tenderness. There is no guarding.   Musculoskeletal:         General: No swelling or signs of injury.      Cervical back: Rigidity and tenderness present.      Right lower leg: No edema.      Left lower leg: No edema.   Lymphadenopathy:      Cervical: No cervical adenopathy.   Skin:     General: Skin is warm and dry.      Capillary Refill: Capillary refill takes 2 to 3 seconds.      Coloration: Skin is not pale.      Findings: No bruising, erythema, lesion or rash.   Neurological:      General: No focal deficit present.      Mental Status: He is alert and oriented to person, place, and time. Mental status is at baseline.      Cranial Nerves: No cranial nerve deficit, dysarthria or facial asymmetry.      Sensory: Sensation is intact. No sensory deficit.      Motor: Motor function is intact. No weakness, tremor or seizure activity.      Coordination: Coordination normal. Finger-Nose-Finger Test normal.      Gait: Gait is intact.   Psychiatric:         Attention and Perception: Attention and perception normal.         Mood and Affect: Mood and affect normal.         Speech: Speech normal.         Behavior: Behavior normal. Behavior is  cooperative.         Thought Content: Thought content normal.         Cognition and Memory: Cognition and memory normal.         Judgment: Judgment normal.       ---------------------------------------------------------------------------------------------------------------------  EKG:  Pending formal cardiology interpretation. Per my review, appears sinus tachycardia 100s with no evidence of acute ischemia.       Telemetry:  Appearing normal sinus rhythm 90s on my exam.   I have personally looked at both the EKG and the telemetry strips.  ---------------------------------------------------------------------------------------------------------------------   Results from last 7 days   Lab Units 06/15/23  2306 06/15/23  1610   LACTATE mmol/L 1.2  --    WBC 10*3/mm3 16.48* 12.99*   HEMOGLOBIN g/dL 14.4 14.1   HEMATOCRIT % 43.8 42.3   MCV fL 85.5 83.6   MCHC g/dL 32.9 33.3   PLATELETS 10*3/mm3 371 381   INR  0.99  --          Results from last 7 days   Lab Units 06/15/23  2306 06/15/23  1610   SODIUM mmol/L 138 138   POTASSIUM mmol/L 4.2 4.2   CHLORIDE mmol/L 102 103   CO2 mmol/L 22.5 26.0   BUN mg/dL 15 18   CREATININE mg/dL 1.04 0.96   CALCIUM mg/dL 9.7 9.5   GLUCOSE mg/dL 115* 84   ALBUMIN g/dL 4.5 4.7   BILIRUBIN mg/dL 0.5 0.3   ALK PHOS U/L 81 77   AST (SGOT) U/L 23 16   ALT (SGPT) U/L 26 22   Estimated Creatinine Clearance: 128.4 mL/min (by C-G formula based on SCr of 1.04 mg/dL).  No results found for: AMMONIA  Results from last 7 days   Lab Units 06/16/23  0138 06/15/23  2306   HSTROP T ng/L 6 6         Lab Results   Component Value Date    HGBA1C 5.70 02/11/2019     Lab Results   Component Value Date    TSH 2.370 03/16/2023     No results found for: PREGTESTUR, PREGSERUM, HCG, HCGQUANT  Pain Management Panel           No data to display                    ---------------------------------------------------------------------------------------------------------------------  Imaging Results (Last 7 Days)        Procedure Component Value Units Date/Time    CT Head Without Contrast [[id number]] Collected: 06/16/23 0053     Updated: 06/16/23 0101    Narrative:      Examination: CT head without contrast     CLINICAL HISTORY: Fever and headache     COMPARISON: None     TECHNIQUE: Contiguous 3 mm thick slices were obtained from the skull  base to the vertex without contrast. Coronal and sagittal reformats were  performed.     FINDINGS: Brain volume is normal for age. There is no acute intracranial  hemorrhage. No acute territorial infarct. No extra-axial collection. No  shift of midline structures. The visualized paranasal sinuses and  mastoid air cells are largely clear.       Impression:      1. No acute intracranial process.     This report was finalized on 6/16/2023 12:59 AM by Asa Mcnamara MD.       XR Chest 1 View [[id number]] Collected: 06/15/23 2319     Updated: 06/15/23 2322    Narrative:      Procedure: Portable chest x-ray examination performed on 06/15/2023.  Single view. Upright position.     HISTORY: Severe sepsis. Evaluate lungs.     FINDINGS:     Normal heart size.  No lobar consolidation or edema.  No pleural effusion or pneumothorax.  Normal cardiac mediastinal size and contour.  No hilar adenopathy.  Very slightly hypoinflated lungs.  No fracture or foreign body.  No free air in the upper abdomen.       Impression:         1.  No acute process seen in the chest.  2.  No lobar consolidation or edema.  3.  No features of bronchial inflammation or hilar adenopathy.  4.  No free air in the upper abdomen.  5.  No fracture or foreign body.     This report was finalized on 6/15/2023 11:20 PM by Andrew Crespo MD.             Last echocardiogram: No previous echo on file.      I have personally reviewed the above radiology images and read the final radiology report on 06/16/23  ---------------------------------------------------------------------------------------------------------------------  Assessment / Plan      Active Hospital Problems    Diagnosis  POA    **Viral meningitis [A87.9]  Yes       ASSESSMENT/PLAN:  -SIRS/sepsis criteria met on admission of unclear etiology  -Symptoms concerning for viral meningitis, effectively ruled out  -Concern for possible tick-borne illness  -Met SIRS criteria with HR>90, WBCs>12, and C-RP elevation. Procal and lactate non-elevated.   -UA overall non concerning for UTI. Chest xray unremarkable. No evidence of consolidations/pneumonia.   -CT head without contrast negative for acute intracranial abnormality.   -Peripheral blood cultures x2 pending.   -Received sepsis fluid bolus and IV abx in the ED. Continued Vancomycin and rocephin on admission (per attending provider) for empiric treatment pending further workup.  -Oral doxycycline for empiric treatment of possible tick-borne illness.   -Patient underwent LP in the ED and tolerated procedure well. CSF culture pending. CSF + for glucose and protein. Meningitis/encephalitis panel negative. Respiratory panel negative.   -LFTs, total bili, PT/INR all within normal limits. Tick-borne panel sent, although patient denies known recent contact with ticks.   -PEYTON direct and RF ordered to rule out autoimmune related process as he has strong family history of RA and Lupus. Sed rate mildly elevated at 33.   -Tylenol or Toradol PRN for pain/mild fever.   -Obtain VS per hospital policy.   -Repeat CBC with differential and CMP in the a.m.   -Infectious Disease consult placed. Greatly appreciate their input and assistance.     -DALLIN, compliant with CPAP  -Obesity by BMI, Body mass index is 37.52 kg/m².  -Affecting all aspects of care.  -Encourage lifestyle modifications.     ----------  -DVT prophylaxis: Lovenox.  -Activity: As tolerated. Fall precautions.   -Expected length of stay:   INPATIENT status due to the need for care which can only be reasonably provided in an hospital setting such as aggressive/expedited ancillary services and/or  consultation services, the necessity for IV medications, close physician monitoring and/or the possible need for procedures.  In such, I feel patient's risk for adverse outcomes and need for care warrant INPATIENT evaluation and predict the patient's care encounter to likely last beyond 2 midnights.   -Disposition plans to return home on discharge once medically stable.    High risk secondary to SIRS/sepsis of unclear etiology with c/o neck stiffness, neck pain, fever, and headache. Meningitis effectively ruled out. Pending further workup.       CODE STATUS: FULL CODE      April Stokes, APRN 06/16/23  12:37 EDT  Pager #[phone]  ---------------------------------------------------------------------------------------------------------------------

## 2023-06-16 NOTE — PLAN OF CARE
Goal Outcome Evaluation:  Plan of Care Reviewed With: patient        Progress: no change  Outcome Evaluation: Pt had complaints of pain, PRN paid med given. Pt temp raised to 100.8, PRN med given. Pt ambulated in room and to bathroom. VSS. Will continue to follow plan of care.

## 2023-06-16 NOTE — ED PROVIDER NOTES
Subjective   History of Present Illness  53-year-old male presents to the ER due to concerns for increasing headache and neck pain.  Symptoms have been present for the last 2 to 3 days.  Patient noted he was evaluated by his PCP and started on doxycycline due to concerns for possible otitis media.  He noted his PCP had negative viral screening results as well.  Patient noted symptoms have been persistent and he was counseled to report to the ER for further work-up.  Patient had subjective fever but is afebrile on arrival.  Patient notes decreased ability to flex his neck forward.  Denied significant nausea or vomiting.  No chest pain or shortness of breath.  Headache noted.  Mild diplopia.  Vital signs stable.  Afebrile    Review of Systems   Musculoskeletal:  Positive for neck pain.   Neurological:  Positive for headaches.   All other systems reviewed and are negative.    Past Medical History:   Diagnosis Date   • Broken leg    • Bronchitis    • History of ear infections        No Known Allergies    Past Surgical History:   Procedure Laterality Date   • FRACTURE SURGERY Right 1994       Family History   Problem Relation Age of Onset   • Cancer Mother    • Hypertension Mother    • Cancer Father    • Anuerysm Father    • Diabetes Brother    • Diabetes Maternal Grandfather    • Heart disease Maternal Grandfather        Social History     Socioeconomic History   • Marital status: Single   Tobacco Use   • Smoking status: Never   • Smokeless tobacco: Never   Vaping Use   • Vaping Use: Never used   Substance and Sexual Activity   • Alcohol use: Yes     Alcohol/week: 1.0 standard drink     Types: 1 Cans of beer per week     Comment: not weekly, once a month   • Drug use: No   • Sexual activity: Not Currently           Objective   Physical Exam  Constitutional:       General: He is not in acute distress.     Appearance: He is well-developed. He is not ill-appearing.   HENT:      Head: Normocephalic and atraumatic.   Eyes:       Extraocular Movements: Extraocular movements intact.      Pupils: Pupils are equal, round, and reactive to light.   Neck:      Vascular: No JVD.   Cardiovascular:      Rate and Rhythm: Normal rate and regular rhythm.      Heart sounds: Normal heart sounds. No murmur heard.  Pulmonary:      Effort: No tachypnea, accessory muscle usage or respiratory distress.      Breath sounds: Normal breath sounds. No stridor. No decreased breath sounds, wheezing, rhonchi or rales.   Chest:      Chest wall: No deformity, tenderness or crepitus.   Abdominal:      General: Bowel sounds are normal.      Palpations: Abdomen is soft.      Tenderness: There is no abdominal tenderness. There is no guarding or rebound.   Musculoskeletal:         General: Normal range of motion.      Cervical back: Normal range of motion and neck supple.      Right lower leg: No tenderness. No edema.      Left lower leg: No tenderness. No edema.   Lymphadenopathy:      Cervical: No cervical adenopathy.   Skin:     General: Skin is warm and dry.      Coloration: Skin is not cyanotic.      Findings: No ecchymosis or erythema.   Neurological:      General: No focal deficit present.      Mental Status: He is alert and oriented to person, place, and time.      Cranial Nerves: No cranial nerve deficit.      Motor: No weakness.      Comments: Patient is able to flex his neck approximately 45 degrees.  Patient notes he is unable to touch his chin to his chest due to neck pain.  Patient has no neck pain with flexion of his hips   Psychiatric:         Mood and Affect: Mood normal. Mood is not anxious.         Behavior: Behavior normal. Behavior is not agitated.       Lumbar Puncture    Date/Time: 6/16/2023 6:28 AM  Performed by: Gomze Bills MD  Authorized by: Gomez Bills MD     Consent:     Consent obtained:  Written    Consent given by:  Patient  Universal protocol:     Patient identity confirmed:  Arm band  Pre-procedure details:      Procedure purpose:  Diagnostic    Preparation: Patient was prepped and draped in usual sterile fashion    Anesthesia:     Anesthesia method:  Local infiltration    Local anesthetic:  Lidocaine 1% w/o epi  Procedure details:     Lumbar space:  L4-L5 interspace    Patient position:  L lateral decubitus    Needle gauge:  22    Needle type:  Spinal needle - Quincke tip    Needle length (in):  3.5    Ultrasound guidance: no      Number of attempts:  1    Fluid appearance:  Clear    Tubes of fluid:  4    Total volume (ml):  8  Post-procedure details:     Puncture site:  Adhesive bandage applied    Procedure completion:  Tolerated well, no immediate complications  Procedural Sedation    Date/Time: 6/16/2023 6:29 AM  Performed by: Gomez Bills MD  Authorized by: Gomez Bills MD     Consent:     Consent obtained:  Written    Consent given by:  Patient    Alternatives discussed:  Analgesia without sedation  Universal protocol:     Patient identity confirmed:  Arm band  Indications:     Procedure performed:  Lumbar puncture    Procedure necessitating sedation performed by:  Physician performing sedation    Intended level of sedation:  Moderate  Pre-sedation assessment:     Time since last food or drink:  4 hrs    ASA classification: class 1 - normal, healthy patient      Mouth opening:  3 or more finger widths    Thyromental distance:  3 finger widths    Mallampati score:  III - soft palate, base of uvula visible    Neck mobility: normal    Immediate pre-procedure details:     Verified: bag valve mask available, emergency equipment available, intubation equipment available and oxygen available    Procedure details (see MAR for exact dosages):     Sedation:  Propofol (80 mg total)    Intra-procedure monitoring:  Blood pressure monitoring, cardiac monitor and continuous pulse oximetry    Intra-procedure events: none    Post-procedure details:     Foreign bodies recovered: Cerebrospinal fluid.    Patient is stable  for discharge or admission: yes      Procedure completion:  Tolerated well, no immediate complications               ED Course  ED Course as of 06/17/23 0626   Fri Jun 16, 2023 0018 EKG notes sinus tachycardia.  101 bpm.  No acute ST elevation.    Electronically signed by Wally Taylor DO, 06/16/23, 12:19 AM EDT.   [SF]   0149 CT Head Without Contrast [SF]   0215 Care transition to Dr. Bills    Electronically signed by Wally Taylor DO, 06/16/23, 2:15 AM EDT.   [SF]   0220 I assumed patient's care from Dr. Taylor at the end of his shift.  Please see his documentation above for further details. [CM]   0504 Patient has been resting comfortably.  He is awake, alert, well-oriented, in no apparent acute distress.  His neck is soft and supple with full range of motion.  There is no rigidity.  There is no Kernig's or Brudzinski sign.  We discussed a lumbar puncture, he does wish to proceed with this.  He would like sedation for the procedure.  Nursing is setting up for this now. [CM]   0657 Case discussed with and care endorsed to Dr. Salazar at shift change.  Pending CSF results and respiratory PCR [CM]   0921 Pt complaining of a headache currently afebrile provided Motrin 800 mg orally x1  Electronically signed by Saray Salazar DO, 06/16/23, 9:22 AM EDT.   [LK]      ED Course User Index  [CM] Gomez Bills MD  [LK] Saray Salazar DO  [SF] Wally Taylor DO                                           Medical Decision Making  Amount and/or Complexity of Data Reviewed  Labs: ordered.  Radiology: ordered. Decision-making details documented in ED Course.  ECG/medicine tests: ordered.    Risk  OTC drugs.  Prescription drug management.        Final diagnoses:   Viral meningitis       ED Disposition  ED Disposition     ED Disposition   Decision to Admit    Condition   --    Comment   Level of Care: Remote Telemetry [26]   Diagnosis: Viral meningitis [200877]   Certification: I Certify That Inpatient Hospital Services  Are Medically Necessary For Greater Than 2 Midnights               Please note that portions of this note were completed with a voice recognition program.                Gomez Bills MD  06/17/23 0622

## 2023-06-17 ENCOUNTER — APPOINTMENT (OUTPATIENT)
Dept: CT IMAGING | Facility: HOSPITAL | Age: 33
DRG: 872 | End: 2023-06-17
Payer: COMMERCIAL

## 2023-06-17 LAB
ALBUMIN SERPL-MCNC: 3.5 G/DL (ref 3.5–5.2)
ALBUMIN/GLOB SERPL: 1 G/DL
ALP SERPL-CCNC: 65 U/L (ref 39–117)
ALT SERPL W P-5'-P-CCNC: 31 U/L (ref 1–41)
ANION GAP SERPL CALCULATED.3IONS-SCNC: 11.7 MMOL/L (ref 5–15)
AST SERPL-CCNC: 23 U/L (ref 1–40)
B BURGDOR IGG+IGM SER QL IA: NEGATIVE
B BURGDOR IGG+IGM SER QL IA: NEGATIVE
BASOPHILS # BLD AUTO: 0.06 10*3/MM3 (ref 0–0.2)
BASOPHILS NFR BLD AUTO: 0.6 % (ref 0–1.5)
BILIRUB SERPL-MCNC: 0.3 MG/DL (ref 0–1.2)
BUN SERPL-MCNC: 15 MG/DL (ref 6–20)
BUN/CREAT SERPL: 14.9 (ref 7–25)
CALCIUM SPEC-SCNC: 8.6 MG/DL (ref 8.6–10.5)
CHLORIDE SERPL-SCNC: 106 MMOL/L (ref 98–107)
CHROMATIN AB SERPL-ACNC: 12 IU/ML (ref 0–14)
CO2 SERPL-SCNC: 21.3 MMOL/L (ref 22–29)
CREAT SERPL-MCNC: 1.01 MG/DL (ref 0.76–1.27)
DEPRECATED RDW RBC AUTO: 39 FL (ref 37–54)
EGFRCR SERPLBLD CKD-EPI 2021: 101.3 ML/MIN/1.73
EOSINOPHIL # BLD AUTO: 0.05 10*3/MM3 (ref 0–0.4)
EOSINOPHIL NFR BLD AUTO: 0.5 % (ref 0.3–6.2)
ERYTHROCYTE [DISTWIDTH] IN BLOOD BY AUTOMATED COUNT: 12.5 % (ref 12.3–15.4)
GLOBULIN UR ELPH-MCNC: 3.5 GM/DL
GLUCOSE SERPL-MCNC: 93 MG/DL (ref 65–99)
HCT VFR BLD AUTO: 36.7 % (ref 37.5–51)
HGB BLD-MCNC: 12 G/DL (ref 13–17.7)
IMM GRANULOCYTES # BLD AUTO: 0.04 10*3/MM3 (ref 0–0.05)
IMM GRANULOCYTES NFR BLD AUTO: 0.4 % (ref 0–0.5)
LYMPHOCYTES # BLD AUTO: 0.98 10*3/MM3 (ref 0.7–3.1)
LYMPHOCYTES NFR BLD AUTO: 10.6 % (ref 19.6–45.3)
MCH RBC QN AUTO: 28.1 PG (ref 26.6–33)
MCHC RBC AUTO-ENTMCNC: 32.7 G/DL (ref 31.5–35.7)
MCV RBC AUTO: 85.9 FL (ref 79–97)
MONOCYTES # BLD AUTO: 0.99 10*3/MM3 (ref 0.1–0.9)
MONOCYTES NFR BLD AUTO: 10.7 % (ref 5–12)
NEUTROPHILS NFR BLD AUTO: 7.13 10*3/MM3 (ref 1.7–7)
NEUTROPHILS NFR BLD AUTO: 77.2 % (ref 42.7–76)
NRBC BLD AUTO-RTO: 0 /100 WBC (ref 0–0.2)
PLATELET # BLD AUTO: 285 10*3/MM3 (ref 140–450)
PMV BLD AUTO: 8.7 FL (ref 6–12)
POTASSIUM SERPL-SCNC: 3.9 MMOL/L (ref 3.5–5.2)
PROCALCITONIN SERPL-MCNC: 0.16 NG/ML (ref 0–0.25)
PROT SERPL-MCNC: 7 G/DL (ref 6–8.5)
RBC # BLD AUTO: 4.27 10*6/MM3 (ref 4.14–5.8)
SODIUM SERPL-SCNC: 139 MMOL/L (ref 136–145)
WBC NRBC COR # BLD: 9.25 10*3/MM3 (ref 3.4–10.8)

## 2023-06-17 PROCEDURE — 85025 COMPLETE CBC W/AUTO DIFF WBC: CPT | Performed by: STUDENT IN AN ORGANIZED HEALTH CARE EDUCATION/TRAINING PROGRAM

## 2023-06-17 PROCEDURE — 25010000002 ENOXAPARIN PER 10 MG: Performed by: STUDENT IN AN ORGANIZED HEALTH CARE EDUCATION/TRAINING PROGRAM

## 2023-06-17 PROCEDURE — 84145 PROCALCITONIN (PCT): CPT | Performed by: STUDENT IN AN ORGANIZED HEALTH CARE EDUCATION/TRAINING PROGRAM

## 2023-06-17 PROCEDURE — 70491 CT SOFT TISSUE NECK W/DYE: CPT

## 2023-06-17 PROCEDURE — 99233 SBSQ HOSP IP/OBS HIGH 50: CPT

## 2023-06-17 PROCEDURE — 70491 CT SOFT TISSUE NECK W/DYE: CPT | Performed by: RADIOLOGY

## 2023-06-17 PROCEDURE — 25510000001 IOPAMIDOL 61 % SOLUTION: Performed by: STUDENT IN AN ORGANIZED HEALTH CARE EDUCATION/TRAINING PROGRAM

## 2023-06-17 PROCEDURE — 87040 BLOOD CULTURE FOR BACTERIA: CPT | Performed by: STUDENT IN AN ORGANIZED HEALTH CARE EDUCATION/TRAINING PROGRAM

## 2023-06-17 PROCEDURE — 25010000002 DEXAMETHASONE SODIUM PHOSPHATE 10 MG/ML SOLUTION: Performed by: STUDENT IN AN ORGANIZED HEALTH CARE EDUCATION/TRAINING PROGRAM

## 2023-06-17 PROCEDURE — 86753 PROTOZOA ANTIBODY NOS: CPT | Performed by: PHYSICIAN ASSISTANT

## 2023-06-17 PROCEDURE — 86611 BARTONELLA ANTIBODY: CPT | Performed by: PHYSICIAN ASSISTANT

## 2023-06-17 PROCEDURE — 80053 COMPREHEN METABOLIC PANEL: CPT | Performed by: STUDENT IN AN ORGANIZED HEALTH CARE EDUCATION/TRAINING PROGRAM

## 2023-06-17 PROCEDURE — 25010000002 KETOROLAC TROMETHAMINE PER 15 MG: Performed by: STUDENT IN AN ORGANIZED HEALTH CARE EDUCATION/TRAINING PROGRAM

## 2023-06-17 PROCEDURE — 99222 1ST HOSP IP/OBS MODERATE 55: CPT | Performed by: PHYSICIAN ASSISTANT

## 2023-06-17 RX ORDER — AZITHROMYCIN 250 MG/1
250 TABLET, FILM COATED ORAL
Status: DISCONTINUED | OUTPATIENT
Start: 2023-06-18 | End: 2023-06-18 | Stop reason: HOSPADM

## 2023-06-17 RX ORDER — CYCLOBENZAPRINE HCL 10 MG
10 TABLET ORAL 3 TIMES DAILY
Status: DISCONTINUED | OUTPATIENT
Start: 2023-06-17 | End: 2023-06-18 | Stop reason: HOSPADM

## 2023-06-17 RX ORDER — AZITHROMYCIN 250 MG/1
500 TABLET, FILM COATED ORAL ONCE
Status: COMPLETED | OUTPATIENT
Start: 2023-06-17 | End: 2023-06-17

## 2023-06-17 RX ORDER — DEXAMETHASONE SODIUM PHOSPHATE 10 MG/ML
10 INJECTION, SOLUTION INTRAMUSCULAR; INTRAVENOUS ONCE
Status: COMPLETED | OUTPATIENT
Start: 2023-06-17 | End: 2023-06-17

## 2023-06-17 RX ADMIN — KETOROLAC TROMETHAMINE 30 MG: 30 INJECTION, SOLUTION INTRAMUSCULAR; INTRAVENOUS at 08:33

## 2023-06-17 RX ADMIN — ACETAMINOPHEN 650 MG: 325 TABLET ORAL at 12:19

## 2023-06-17 RX ADMIN — KETOROLAC TROMETHAMINE 30 MG: 30 INJECTION, SOLUTION INTRAMUSCULAR; INTRAVENOUS at 21:01

## 2023-06-17 RX ADMIN — DOXYCYCLINE 100 MG: 100 CAPSULE ORAL at 08:24

## 2023-06-17 RX ADMIN — CYCLOBENZAPRINE 10 MG: 10 TABLET, FILM COATED ORAL at 20:08

## 2023-06-17 RX ADMIN — SODIUM CHLORIDE, POTASSIUM CHLORIDE, SODIUM LACTATE AND CALCIUM CHLORIDE 100 ML/HR: 600; 310; 30; 20 INJECTION, SOLUTION INTRAVENOUS at 20:08

## 2023-06-17 RX ADMIN — ENOXAPARIN SODIUM 40 MG: 40 INJECTION SUBCUTANEOUS at 20:08

## 2023-06-17 RX ADMIN — Medication 10 ML: at 21:24

## 2023-06-17 RX ADMIN — DOXYCYCLINE 100 MG: 100 CAPSULE ORAL at 20:08

## 2023-06-17 RX ADMIN — KETOROLAC TROMETHAMINE 30 MG: 30 INJECTION, SOLUTION INTRAMUSCULAR; INTRAVENOUS at 14:41

## 2023-06-17 RX ADMIN — DEXAMETHASONE SODIUM PHOSPHATE 10 MG: 10 INJECTION INTRAMUSCULAR; INTRAVENOUS at 18:37

## 2023-06-17 RX ADMIN — SODIUM CHLORIDE, POTASSIUM CHLORIDE, SODIUM LACTATE AND CALCIUM CHLORIDE 100 ML/HR: 600; 310; 30; 20 INJECTION, SOLUTION INTRAVENOUS at 09:31

## 2023-06-17 RX ADMIN — AZITHROMYCIN 500 MG: 250 TABLET, FILM COATED ORAL at 10:25

## 2023-06-17 RX ADMIN — IOPAMIDOL 88 ML: 612 INJECTION, SOLUTION INTRAVENOUS at 13:48

## 2023-06-17 RX ADMIN — DOCUSATE SODIUM 50 MG AND SENNOSIDES 8.6 MG 2 TABLET: 8.6; 5 TABLET, FILM COATED ORAL at 08:24

## 2023-06-17 NOTE — CONSULTS
INFECTIOUS DISEASE CONSULTATION REPORT        Patient Identification:  Name:  Rupert Bean  Age:  33 y.o.  Sex:  male  :  1990  MRN:  [id number]   Visit Number:  [id number]  Primary Care Physician:  Carol Boswell DO  Referring Provider: Ananth Rahman DO  Reason for consult: Concern for meningitis/encephalitis       LOS: 1 day        Subjective       Subjective     History of present illness:      Thank you Dr. Rahman for allowing us to participate in the care of your patient.  As you well know, Mr. Rupert Bean is a 33 y.o. male with past medical history significant for DALLIN on CPAP and obesity, who presented to [facility] Emergency Department on 6/15/2023 for neck pain/stiffness, low-grade fever, and headache.    Today, patient is sitting up in bed on room air in no apparent distress.  Patient reports that neck pain began about a week ago and fever and headache only started 2 days ago.  He initially assumed he may have hurt his neck in [facility], but after fever and headache started, he became more concerned for infection.  Presented to his PCP who prescribed doxycycline.  States that he spends a lot of time outside hiking, but has not noticed any recent tick bites.  Admits to recent cat scratch from his indoor cat, but there is no noted wound or redness.  Denies a history of STDs.  Has a significant family history of autoimmune conditions and primary team have ordered PEYTON.  On physical exam, neck is tender to palpation and there is some swelling but no lymphadenopathy noticeable.  No rigidity.  Lungs clear to auscultation bilaterally.  Abdomen is soft, nontender.  No edema.  Febrile with a Tmax of 102.6 °F yesterday, which has since resolved.  CRP on 2023 was elevated at 10.06.  WBC on 6/15/2023 was elevated at 16.48.  Lactic acid on admission was normal at 1.2.  Procalcitonin on admission was normal at 0.08.  Urinalysis on 6/15/2023 was negative.   Meningitis/encephalitis panel PCR was negative.  5 nucleated cells seen on CSF cell count.  CSF culture is so far showing no growth.  Respiratory panel PCR was negative.  Rickettsia species DNA PCR, Ehrlichia DNA PCR, and Lyme disease total antibody are in progress.  CT head was unremarkable.  Chest x-ray was unremarkable.  Blood cultures on 6/15/2023 are so far showing no growth.    Infectious Disease consultation was requested for antimicrobial management.      ---------------------------------------------------------------------------------------------------------------------     Review Of Systems:    Constitutional: no chills or night sweats. No appetite change or unexpected weight change. No fatigue.  Fever.  Eyes: no eye drainage, itching or redness.  HEENT: no mouth sores, dysphagia or nose bleed.  Neck pain and stiffness.  Respiratory: no for shortness of breath, cough or production of sputum.  Cardiovascular: no chest pain, no palpitations, no orthopnea.  Gastrointestinal: no nausea, vomiting or diarrhea. No abdominal pain, hematemesis or rectal bleeding.  Genitourinary: no dysuria or polyuria.  Hematologic/lymphatic: no lymph node abnormalities, no easy bruising or easy bleeding.  Musculoskeletal: no muscle or joint pain.  Skin: No rash and no itching.  Neurological: no loss of consciousness, no seizure.  Headache.  Behavioral/Psych: no depression or suicidal ideation.  Endocrine: no hot flashes.  Immunologic: negative.    ---------------------------------------------------------------------------------------------------------------------     Past Medical History    Past Medical History:   Diagnosis Date    Broken leg     Bronchitis     History of ear infections        Past Surgical History    Past Surgical History:   Procedure Laterality Date    FRACTURE SURGERY Right 1994       Family History    Family History   Problem Relation Age of Onset    Cancer Mother     Hypertension Mother     Cancer Father      Francesca Father     Diabetes Brother     Diabetes Maternal Grandfather     Heart disease Maternal Grandfather        Social History    Social History     Tobacco Use    Smoking status: Never    Smokeless tobacco: Never   Vaping Use    Vaping Use: Never used   Substance Use Topics    Alcohol use: Yes     Alcohol/week: 1.0 standard drink     Types: 1 Cans of beer per week     Comment: not weekly, once a month    Drug use: No       Allergies    Patient has no known allergies.  ---------------------------------------------------------------------------------------------------------------------     Home Medications:    Prior to Admission Medications       Prescriptions Last Dose Informant Patient Reported? Taking?    acetaminophen (TYLENOL) 500 MG tablet 6/16/2023 Self Yes Yes    Take 1 tablet by mouth Every 6 (Six) Hours As Needed for Mild Pain.    doxycycline (VIBRAMYCIN) 100 MG capsule 6/16/2023 Self, Pharmacy Yes Yes    Take 1 capsule by mouth 2 (Two) Times a Day.    ibuprofen (ADVIL,MOTRIN) 200 MG tablet 6/16/2023 Self Yes Yes    Take 4 tablets by mouth Every 6 (Six) Hours As Needed for Mild Pain.    naproxen sodium (ALEVE) 220 MG tablet Past Week Self Yes Yes    Take 1 tablet by mouth 2 (Two) Times a Day As Needed for Mild Pain, Fever or Headache.    aspirin 325 MG EC tablet Unknown Self Yes No    Take 1 tablet by mouth Every 6 (Six) Hours As Needed for Mild Pain or Fever.          ---------------------------------------------------------------------------------------------------------------------    Objective       Objective     [facility]:  [START ON 6/18/2023] azithromycin, 250 mg, Oral, Q24H  azithromycin, 500 mg, Oral, Once  doxycycline, 100 mg, Oral, Q12H  enoxaparin, 40 mg, Subcutaneous, Nightly  senna-docusate sodium, 2 tablet, Oral, BID  sodium chloride, 10 mL, Intravenous, Q12H      lactated ringers, 100 mL/hr, Last Rate: 100 mL/hr (06/16/23 3543)  Pharmacy Consult,        ---------------------------------------------------------------------------------------------------------------------   Vital Signs:  Temp:  [98.1 °F (36.7 °C)-102.6 °F (39.2 °C)] 98.5 °F (36.9 °C)  Heart Rate:  [] 81  Resp:  [16-20] 16  BP: (105-130)/(61-80) 130/80  Mean Arterial Pressure (Non-Invasive) for the past 24 hrs (Last 3 readings):   Noninvasive MAP (mmHg)   06/16/23 1015 90     SpO2 Percentage    06/16/23 2300 06/17/23 0300 06/17/23 0620   SpO2: 96% 97% 96%     SpO2:  [94 %-100 %] 96 %  on   ;   Device (Oxygen Therapy): room air    Body mass index is 37.52 kg/m².  Wt Readings from Last 3 Encounters:   06/16/23 115 kg (254 lb 1.6 oz)   06/15/23 112 kg (248 lb)   04/04/23 115 kg (253 lb)     ---------------------------------------------------------------------------------------------------------------------     Physical Exam:    Constitutional: Obese  male is sitting up in bed on room air in no apparent distress.  HENT:  Head: Normocephalic and atraumatic.  Mouth:  Moist mucous membranes.    Eyes:  Conjunctivae and EOM are normal.  No scleral icterus.  Neck:  Neck supple.  No JVD present.  Tenderness to palpation and some swelling but no lymphadenopathy noted.  No rigidity.  Cardiovascular:  Normal rate, regular rhythm and normal heart sounds with no murmur. No edema.  Pulmonary/Chest:  No respiratory distress, no wheezes, no crackles, with normal breath sounds and good air movement.  Abdominal:  Soft.  Bowel sounds are normal.  No distension and no tenderness.   Musculoskeletal:  No edema, no tenderness, and no deformity.  No swelling or redness of joints.  Neurological:  Alert and oriented to person, place, and time.  No facial droop.  No slurred speech.   Skin:  Skin is warm and dry.  No rash noted.  No pallor.   Psychiatric:  Normal mood and affect.  Behavior is  normal.    ---------------------------------------------------------------------------------------------------------------------    Results from last 7 days   Lab Units 06/16/23  0138 06/15/23  2306   CK TOTAL U/L 160  --    HSTROP T ng/L 6 6           Results from last 7 days   Lab Units 06/16/23  0138 06/15/23  2306 06/15/23  1610   CRP mg/dL 10.02*  --   --    LACTATE mmol/L  --  1.2  --    WBC 10*3/mm3  --  16.48* 12.99*   HEMOGLOBIN g/dL  --  14.4 14.1   HEMATOCRIT %  --  43.8 42.3   MCV fL  --  85.5 83.6   MCHC g/dL  --  32.9 33.3   PLATELETS 10*3/mm3  --  371 381   INR   --  0.99  --      Results from last 7 days   Lab Units 06/15/23  2306 06/15/23  1610   SODIUM mmol/L 138 138   POTASSIUM mmol/L 4.2 4.2   CHLORIDE mmol/L 102 103   CO2 mmol/L 22.5 26.0   BUN mg/dL 15 18   CREATININE mg/dL 1.04 0.96   CALCIUM mg/dL 9.7 9.5   GLUCOSE mg/dL 115* 84   ALBUMIN g/dL 4.5 4.7   BILIRUBIN mg/dL 0.5 0.3   ALK PHOS U/L 81 77   AST (SGOT) U/L 23 16   ALT (SGPT) U/L 26 22   Estimated Creatinine Clearance: 127.5 mL/min (by C-G formula based on SCr of 1.04 mg/dL).  No results found for: AMMONIA    No results found for: HGBA1C, POCGLU  Lab Results   Component Value Date    HGBA1C 5.70 02/11/2019     Lab Results   Component Value Date    TSH 2.370 03/16/2023       Blood Culture   Date Value Ref Range Status   06/15/2023 No growth at 24 hours  Preliminary   06/15/2023 No growth at 24 hours  Preliminary     No results found for: URINECX  No results found for: WOUNDCX  No results found for: STOOLCX  No results found for: RESPCX  Pain Management Panel           No data to display              I have personally reviewed the above laboratory results.   ---------------------------------------------------------------------------------------------------------------------  Imaging Results (Last 7 Days)       Procedure Component Value Units Date/Time    CT Head Without Contrast [[id number]] Collected: 06/16/23 0053     Updated: 06/16/23  0101    Narrative:      Examination: CT head without contrast     CLINICAL HISTORY: Fever and headache     COMPARISON: None     TECHNIQUE: Contiguous 3 mm thick slices were obtained from the skull  base to the vertex without contrast. Coronal and sagittal reformats were  performed.     FINDINGS: Brain volume is normal for age. There is no acute intracranial  hemorrhage. No acute territorial infarct. No extra-axial collection. No  shift of midline structures. The visualized paranasal sinuses and  mastoid air cells are largely clear.       Impression:      1. No acute intracranial process.     This report was finalized on 6/16/2023 12:59 AM by Asa Mcnamara MD.       XR Chest 1 View [[id number]] Collected: 06/15/23 2319     Updated: 06/15/23 2322    Narrative:      Procedure: Portable chest x-ray examination performed on 06/15/2023.  Single view. Upright position.     HISTORY: Severe sepsis. Evaluate lungs.     FINDINGS:     Normal heart size.  No lobar consolidation or edema.  No pleural effusion or pneumothorax.  Normal cardiac mediastinal size and contour.  No hilar adenopathy.  Very slightly hypoinflated lungs.  No fracture or foreign body.  No free air in the upper abdomen.       Impression:         1.  No acute process seen in the chest.  2.  No lobar consolidation or edema.  3.  No features of bronchial inflammation or hilar adenopathy.  4.  No free air in the upper abdomen.  5.  No fracture or foreign body.     This report was finalized on 6/15/2023 11:20 PM by Andrew Crespo MD.             I have personally reviewed the above radiology results.   ---------------------------------------------------------------------------------------------------------------------      Pertinent Infectious Disease Results    Lactic acid on admission was normal at 1.2.  Procalcitonin on admission was normal at 0.08.  Urinalysis on 6/15/2023 was negative.  Meningitis/encephalitis panel PCR was negative.  5 nucleated cells seen on  CSF cell count.  CSF culture is so far showing no growth.  Respiratory panel PCR was negative.  Rickettsia species DNA PCR, Ehrlichia DNA PCR, and Lyme disease total antibody are in progress.  CT head was unremarkable.  Chest x-ray was unremarkable.  Blood cultures on 6/15/2023 are so far showing no growth.    Assessment & Plan      Assessment      SIRS criteria met on admission of unclear etiology  Fever  Meningitis/encephalitis, effectively ruled out  Concern for possible tickborne illness  Concern for possible Bartonella henselae infection      Plan      Today, patient is sitting up in bed on room air in no apparent distress.  Patient reports that neck pain began about a week ago and fever and headache only started 2 days ago.  He initially assumed he may have hurt his neck in [facility], but after fever and headache started, he became more concerned for infection.  Presented to his PCP who prescribed doxycycline.  States that he spends a lot of time outside hiking, but has not noticed any recent tick bites.  Admits to recent cat scratch from his indoor cat, but there is no noted wound or redness.  Denies a history of STDs.  Has a significant family history of autoimmune conditions and primary team have ordered PEYTON.  On physical exam, neck is tender to palpation and there is some swelling but no lymphadenopathy noticeable.  No rigidity.  Lungs clear to auscultation bilaterally.  Abdomen is soft, nontender.  No edema.  Febrile with a Tmax of 102.6 °F yesterday, which has since resolved.  CRP on 6/16/2023 was elevated at 10.06.  WBC on 6/15/2023 was elevated at 16.48.  Lactic acid on admission was normal at 1.2.  Procalcitonin on admission was normal at 0.08.  Urinalysis on 6/15/2023 was negative.  Meningitis/encephalitis panel PCR was negative.  5 nucleated cells seen on CSF cell count.  CSF culture is so far showing no growth.  Respiratory panel PCR was negative.  Rickettsia species DNA PCR, Ehrlichia DNA PCR, and  Lyme disease total antibody are in progress.  CT head was unremarkable.  Chest x-ray was unremarkable.  Blood cultures on 6/15/2023 are so far showing no growth.    As lumbar puncture was fairly unremarkable and meningitis/encephalitis panel was negative, meningitis/encephalitis has essentially been ruled out.    Awaiting results of tick titers and recommend to continue on doxycycline for the time being.  Would be most concerned for [place] spotted fever with fever and headache.    With complaints of recent cat scratch at home, Bartonella to body panel was ordered and azithromycin was initiated x5 days empirically to cover for cat scratch disease.    Recommend CT neck to rule out occult infection/abscess, especially with high-grade fever yesterday and continued complaints of neck pain.    With high-grade fever after blood cultures were taken, repeat blood cultures ordered today.    Vancomycin and Rocephin were discontinued.    ANTIMICROBIAL THERAPY    azithromycin - 250 MG, 250 MG  doxycycline - 100 MG, 100 MG       Again, thank you Dr. Rahman for allowing us to participate in the care of your patient and please feel free to call for any questions you may have.      Code Status:     Code Status and Medical Interventions:   Ordered at: 06/16/23 1052     Code Status (Patient has no pulse and is not breathing):    CPR (Attempt to Resuscitate)     Medical Interventions (Patient has pulse or is breathing):    Full Support         Neha Foster PA-C  06/17/23  09:30 EDT

## 2023-06-17 NOTE — PROGRESS NOTES
Patient Identification:  Name:  Rupert Bena  Age:  33 y.o.  Sex:  male  :  1990  MRN:  [id number]  Visit Number:  [id number]  Primary Care Provider:  Carol Boswell DO    Length of stay:  1    Subjective/Interval History/Consultants/Procedures     Chief complaint: Follow-up, sepsis of unclear etiology    Subjective/Interval History:  Rupert Bean is our 33 y.o. male patient admitted on 2023 due to SIRS/sepsis. He has a past medical history significant for DALLIN on CPAP and obesity by BMI. For further and complete admitting details, please see admission H&P.    Patient was admitted to the [facility] floor for further monitoring, evaluation, and treatment.  Presenting symptoms were concerning for viral meningitis.  Patient underwent LP in the emergency department.  Encephalitis/meningitis panel negative, effectively ruling out viral meningitis. CSF positive for protein and glucose. Culture remains pending. Peripheral blood cultures x2 obtained. No growth at 24 hours.  UA nonconcerning for UTI. Chest x-ray was unremarkable. There were no evidence of consolidations or pneumonia. CT of the head without contrast was also unremarkable. He received sepsis fluid bolus and IV antibiotics in the ED.  Vancomycin and Rocephin were continued on admission, however ID consult was placed and they recommended oral doxycycline (empiric coverage for possible tickborne illness) and oral azithromycin (due to recent cat scratch).  Tickborne panel and Bartonella panel pending.  Other work-up so far unrevealing.    Procedures/Imaging:  Lumbar puncture on 2023  Chest x-ray  CT of the head without contrast  CT of the neck with contrast, pending    Consults:  Infectious Disease    On today's exam, the patient was sitting up in bed with no signs or symptoms of acute distress noted. He reports ongoing neck stiffness and pain, although he states that pain has improved.  He states that he did have a fever overnight.   Still no shortness of breath, chest pain, cough, sputum production, abdominal pain, nausea, vomiting, or diarrhea.  Per ID recommendations, patient will undergo stat CT of the neck with contrast at this time. Discussed plan with patient.  He voiced agreement with no further questions or concerns at this time.  There are no family members present at bedside during my exam.    Discussed with AM RN Nika with no acute events overnight. Room location at the time of evaluation was 350B.  Discussed with attending physician, Ananth Garvey DO.  ----------------------------------------------------------------------------------------------------------------------  [facility] [place] Meds:  [START ON 6/18/2023] azithromycin, 250 mg, Oral, Q24H  doxycycline, 100 mg, Oral, Q12H  enoxaparin, 40 mg, Subcutaneous, Nightly  senna-docusate sodium, 2 tablet, Oral, BID  sodium chloride, 10 mL, Intravenous, Q12H      lactated ringers, 100 mL/hr, Last Rate: 100 mL/hr (06/17/23 0931)  Pharmacy Consult,       ----------------------------------------------------------------------------------------------------------------------      Objective     Vital Signs:  Temp:  [98.1 °F (36.7 °C)-102.6 °F (39.2 °C)] 98.2 °F (36.8 °C)  Heart Rate:  [] 87  Resp:  [16-20] 17  BP: (105-130)/(61-80) 118/79      06/15/23  2217 06/16/23  1245   Weight: 113 kg (250 lb) 115 kg (254 lb 1.6 oz)     Body mass index is 37.52 kg/m².    Intake/Output Summary (Last 24 hours) at 6/17/2023 1039  Last data filed at 6/17/2023 0300  Gross per 24 hour   Intake 712 ml   Output --   Net 712 ml     No intake/output data recorded.  Diet: Regular/House Diet; Texture: Regular Texture (IDDSI 7); Fluid Consistency: Thin (IDDSI 0)  ----------------------------------------------------------------------------------------------------------------------    Physical Exam  Vitals reviewed.   Constitutional:       General: He is awake. He is not in acute distress.      Appearance: He is obese. He is not ill-appearing or diaphoretic.      Comments: Room air.   HENT:      Head: Normocephalic and atraumatic.      Mouth/Throat:      Mouth: Mucous membranes are moist.      Pharynx: Oropharynx is clear.   Eyes:      Extraocular Movements: Extraocular movements intact.      Pupils: Pupils are equal, round, and reactive to light.   Cardiovascular:      Rate and Rhythm: Normal rate and regular rhythm.      Pulses: Normal pulses.           Dorsalis pedis pulses are 2+ on the right side and 2+ on the left side.      Heart sounds: Normal heart sounds. No murmur heard.    No friction rub.   Pulmonary:      Effort: Pulmonary effort is normal. No accessory muscle usage, respiratory distress or retractions.      Breath sounds: No wheezing, rhonchi or rales.   Abdominal:      General: Bowel sounds are normal. There is no distension.      Palpations: Abdomen is soft.      Tenderness: There is no abdominal tenderness. There is no guarding.   Musculoskeletal:      Cervical back: Rigidity and tenderness present.      Right lower leg: No edema.      Left lower leg: No edema.   Lymphadenopathy:      Cervical: No cervical adenopathy.   Skin:     General: Skin is warm and dry.      Capillary Refill: Capillary refill takes 2 to 3 seconds.      Coloration: Skin is not jaundiced or pale.      Findings: No erythema, lesion or rash.   Neurological:      Mental Status: He is alert and oriented to person, place, and time. Mental status is at baseline.      Cranial Nerves: No dysarthria or facial asymmetry.      Sensory: Sensation is intact. No sensory deficit.      Motor: No weakness or tremor.   Psychiatric:         Attention and Perception: Attention and perception normal.         Mood and Affect: Mood and affect normal.         Speech: Speech normal.         Behavior: Behavior normal. Behavior is cooperative.         Thought Content: Thought content normal.         Cognition and Memory: Cognition and memory  normal.         Judgment: Judgment normal.         ----------------------------------------------------------------------------------------------------------------------  Results from last 7 days   Lab Units 06/16/23  0138 06/15/23  2306   CK TOTAL U/L 160  --    HSTROP T ng/L 6 6     Results from last 7 days   Lab Units 06/16/23  0138 06/15/23  2306 06/15/23  1610   CRP mg/dL 10.02*  --   --    LACTATE mmol/L  --  1.2  --    WBC 10*3/mm3  --  16.48* 12.99*   HEMOGLOBIN g/dL  --  14.4 14.1   HEMATOCRIT %  --  43.8 42.3   MCV fL  --  85.5 83.6   MCHC g/dL  --  32.9 33.3   PLATELETS 10*3/mm3  --  371 381   INR   --  0.99  --          Results from last 7 days   Lab Units 06/15/23  2306 06/15/23  1610   SODIUM mmol/L 138 138   POTASSIUM mmol/L 4.2 4.2   CHLORIDE mmol/L 102 103   CO2 mmol/L 22.5 26.0   BUN mg/dL 15 18   CREATININE mg/dL 1.04 0.96   CALCIUM mg/dL 9.7 9.5   GLUCOSE mg/dL 115* 84   ALBUMIN g/dL 4.5 4.7   BILIRUBIN mg/dL 0.5 0.3   ALK PHOS U/L 81 77   AST (SGOT) U/L 23 16   ALT (SGPT) U/L 26 22   Estimated Creatinine Clearance: 127.5 mL/min (by C-G formula based on SCr of 1.04 mg/dL).  No results found for: AMMONIA      Blood Culture   Date Value Ref Range Status   06/15/2023 No growth at 24 hours  Preliminary   06/15/2023 No growth at 24 hours  Preliminary     ----------------------------------------------------------------------------------------------------------------------  Imaging Results (Last 24 Hours)       ** No results found for the last 24 hours. **          ----------------------------------------------------------------------------------------------------------------------   I have reviewed the above laboratory values for 06/17/23    Assessment/Plan     Active Hospital Problems    Diagnosis  POA    **Viral meningitis [A87.9]  Yes       ASSESSMENT/PLAN:  -SIRS/sepsis criteria met on admission of unclear etiology  -Symptoms concerning for viral meningitis, effectively ruled out  -Concern for  possible tick-borne illness  -Met SIRS criteria with HR>90, WBCs>12, and C-RP elevation. Procal and lactate non-elevated.   -UA overall non concerning for UTI. Chest xray unremarkable. No evidence of consolidations/pneumonia.   -CT head without contrast negative for acute intracranial abnormality.   -Peripheral blood cultures x2 pending.  No growth at 24 hours.  -Received sepsis fluid bolus and IV abx in the ED.   -Placed on oral doxycycline for empiric treatment of possible tick-borne illness.   -Infectious disease consult placed. Greatly appreciate their assistance. Per note, recommend to continue doxycycline. They have discontinued Vancomycin/Rocephin and initiated oral azithromycin due to patient reported recent cat scratch at home.  Bartonella panel ordered and pending. They have also recommended CT of the neck to rule out occult infection/abscess.  CT of the neck with contrast ordered and pending at this time.  Repeat blood cultures were also drawn on 6/17/2023.  -Patient underwent LP in the ED and tolerated procedure well. CSF culture pending. CSF + for glucose and protein. Meningitis/encephalitis panel negative. Respiratory panel also negative.   -LFTs, total bili, PT/INR all within normal limits. Tick-borne panel sent, although patient denies known recent contact with ticks.   -PEYTON direct and RF ordered to rule out autoimmune related process as he has strong family history of RA and Lupus. Sed rate mildly elevated at 33.  RF negative.  PEYTON remains pending.  -Tylenol or Toradol PRN for pain/mild fever.   -Obtain VS per hospital policy.   -Repeat CBC with differential and CMP in the a.m.      -DALLIN, compliant with CPAP  -Obesity by BMI, Body mass index is 37.52 kg/m².  -Affecting all aspects of care.  -Encourage lifestyle modifications.       -----------  -DVT prophylaxis: Lovenox.  -GI prophylaxis: None.  -Activity: Up ad bib.  -Disposition plans/anticipated needs: Plans to return home on discharge once  medically stable.  -Diet: Regular.  -Home supplemental O2: Room air is baseline.      High risk secondary to SIRS/sepsis of unclear etiology with c/o neck stiffness, neck pain, fever, and headache. Meningitis effectively ruled out. Pending further workup.       MARY Gallegos  06/17/23  10:39 EDT  Pager #[phone]

## 2023-06-17 NOTE — PLAN OF CARE
Goal Outcome Evaluation:              Outcome Evaluation: Pt resting in bed at this time. PRN meds given for pain and temp. Pt ambulates independently. Cont IV fluids and abx. Will continue POC.

## 2023-06-18 ENCOUNTER — READMISSION MANAGEMENT (OUTPATIENT)
Dept: CALL CENTER | Facility: HOSPITAL | Age: 33
End: 2023-06-18
Payer: COMMERCIAL

## 2023-06-18 VITALS
WEIGHT: 254.1 LBS | HEART RATE: 56 BPM | OXYGEN SATURATION: 99 % | RESPIRATION RATE: 16 BRPM | DIASTOLIC BLOOD PRESSURE: 62 MMHG | SYSTOLIC BLOOD PRESSURE: 111 MMHG | TEMPERATURE: 98.3 F | HEIGHT: 69 IN | BODY MASS INDEX: 37.64 KG/M2

## 2023-06-18 LAB
BASOPHILS # BLD AUTO: 0.01 10*3/MM3 (ref 0–0.2)
BASOPHILS NFR BLD AUTO: 0.1 % (ref 0–1.5)
DEPRECATED RDW RBC AUTO: 39.2 FL (ref 37–54)
EOSINOPHIL # BLD AUTO: 0 10*3/MM3 (ref 0–0.4)
EOSINOPHIL NFR BLD AUTO: 0 % (ref 0.3–6.2)
ERYTHROCYTE [DISTWIDTH] IN BLOOD BY AUTOMATED COUNT: 12.2 % (ref 12.3–15.4)
HCT VFR BLD AUTO: 38.8 % (ref 37.5–51)
HGB BLD-MCNC: 12.3 G/DL (ref 13–17.7)
IMM GRANULOCYTES # BLD AUTO: 0.01 10*3/MM3 (ref 0–0.05)
IMM GRANULOCYTES NFR BLD AUTO: 0.1 % (ref 0–0.5)
LYMPHOCYTES # BLD AUTO: 0.8 10*3/MM3 (ref 0.7–3.1)
LYMPHOCYTES NFR BLD AUTO: 10.9 % (ref 19.6–45.3)
MCH RBC QN AUTO: 27.5 PG (ref 26.6–33)
MCHC RBC AUTO-ENTMCNC: 31.7 G/DL (ref 31.5–35.7)
MCV RBC AUTO: 86.8 FL (ref 79–97)
MONOCYTES # BLD AUTO: 0.61 10*3/MM3 (ref 0.1–0.9)
MONOCYTES NFR BLD AUTO: 8.3 % (ref 5–12)
NEUTROPHILS NFR BLD AUTO: 5.92 10*3/MM3 (ref 1.7–7)
NEUTROPHILS NFR BLD AUTO: 80.6 % (ref 42.7–76)
NRBC BLD AUTO-RTO: 0 /100 WBC (ref 0–0.2)
PLATELET # BLD AUTO: 315 10*3/MM3 (ref 140–450)
PMV BLD AUTO: 8.9 FL (ref 6–12)
R RICKETTSI IGG SER QL IA: NEGATIVE
R RICKETTSI IGM SER-ACNC: 1.11 INDEX (ref 0–0.89)
RBC # BLD AUTO: 4.47 10*6/MM3 (ref 4.14–5.8)
WBC NRBC COR # BLD: 7.35 10*3/MM3 (ref 3.4–10.8)

## 2023-06-18 PROCEDURE — 93005 ELECTROCARDIOGRAM TRACING: CPT | Performed by: INTERNAL MEDICINE

## 2023-06-18 PROCEDURE — 85025 COMPLETE CBC W/AUTO DIFF WBC: CPT | Performed by: STUDENT IN AN ORGANIZED HEALTH CARE EDUCATION/TRAINING PROGRAM

## 2023-06-18 PROCEDURE — 99232 SBSQ HOSP IP/OBS MODERATE 35: CPT | Performed by: PHYSICIAN ASSISTANT

## 2023-06-18 PROCEDURE — 25010000002 ONDANSETRON PER 1 MG: Performed by: STUDENT IN AN ORGANIZED HEALTH CARE EDUCATION/TRAINING PROGRAM

## 2023-06-18 PROCEDURE — 93010 ELECTROCARDIOGRAM REPORT: CPT | Performed by: INTERNAL MEDICINE

## 2023-06-18 PROCEDURE — 99239 HOSP IP/OBS DSCHRG MGMT >30: CPT

## 2023-06-18 RX ORDER — ACETAMINOPHEN 325 MG/1
650 TABLET ORAL EVERY 4 HOURS PRN
Qty: 60 TABLET | Refills: 0 | Status: SHIPPED | OUTPATIENT
Start: 2023-06-18 | End: 2023-06-25

## 2023-06-18 RX ORDER — CYCLOBENZAPRINE HCL 10 MG
10 TABLET ORAL 3 TIMES DAILY
Qty: 21 TABLET | Refills: 0 | Status: SHIPPED | OUTPATIENT
Start: 2023-06-18

## 2023-06-18 RX ORDER — KETOROLAC TROMETHAMINE 10 MG/1
10 TABLET, FILM COATED ORAL EVERY 6 HOURS PRN
Qty: 21 TABLET | Refills: 0 | Status: SHIPPED | OUTPATIENT
Start: 2023-06-18

## 2023-06-18 RX ORDER — AZITHROMYCIN 250 MG/1
250 TABLET, FILM COATED ORAL DAILY
Qty: 3 TABLET | Refills: 0 | Status: SHIPPED | OUTPATIENT
Start: 2023-06-18 | End: 2023-06-22

## 2023-06-18 RX ORDER — DOXYCYCLINE 100 MG/1
100 CAPSULE ORAL EVERY 12 HOURS SCHEDULED
Qty: 10 CAPSULE | Refills: 0 | Status: SHIPPED | OUTPATIENT
Start: 2023-06-18 | End: 2023-06-23

## 2023-06-18 RX ADMIN — Medication 10 ML: at 08:23

## 2023-06-18 RX ADMIN — DOXYCYCLINE 100 MG: 100 CAPSULE ORAL at 08:23

## 2023-06-18 RX ADMIN — ACETAMINOPHEN 650 MG: 325 TABLET ORAL at 04:30

## 2023-06-18 RX ADMIN — AZITHROMYCIN 250 MG: 250 TABLET, FILM COATED ORAL at 08:23

## 2023-06-18 RX ADMIN — SODIUM CHLORIDE, POTASSIUM CHLORIDE, SODIUM LACTATE AND CALCIUM CHLORIDE 100 ML/HR: 600; 310; 30; 20 INJECTION, SOLUTION INTRAVENOUS at 06:03

## 2023-06-18 RX ADMIN — CYCLOBENZAPRINE 10 MG: 10 TABLET, FILM COATED ORAL at 08:23

## 2023-06-18 RX ADMIN — ONDANSETRON 4 MG: 2 INJECTION INTRAMUSCULAR; INTRAVENOUS at 06:12

## 2023-06-18 NOTE — OUTREACH NOTE
Prep Survey    Flowsheet Row Responses   [facility] facility patient discharged from? [place]   Is LACE score < 7 ? Yes   Eligibility [facility] [place]   Date of Admission 06/15/23   Date of Discharge 06/18/23   Discharge Disposition Home or Self Care   Discharge diagnosis SIRS/sepsis of unclear etiology (viral meningitis effectively ruled out)   Does the patient have one of the following disease processes/diagnoses(primary or secondary)? Sepsis   Does the patient have Home health ordered? No   Is there a DME ordered? No   Prep survey completed? Yes          Sarah GARCIA - Registered Nurse

## 2023-06-18 NOTE — DISCHARGE INSTR - APPOINTMENTS
Follow up appointment with KIKO Mcneill, [phone] June 23, 11:00 a.m.  Please bring all outside imaging (x-ray, CT, MRI that were not done at [facility]) discs and reports along with 's license and insurance cards.           Office visit with Carol Boswell DO, Sep 20th 10:15 a.m. . [address], [place], [place], [phone]  Arrive 15 minutes prior to appointment.

## 2023-06-18 NOTE — PROGRESS NOTES
PROGRESS NOTE         Patient Identification:  Name:  Rupert Bean  Age:  33 y.o.  Sex:  male  :  1990  MRN:  [id number]  Visit Number:  [id number]  Primary Care Provider:  Carol Boswell DO         LOS: 2 days       ----------------------------------------------------------------------------------------------------------------------  Subjective       Chief Complaints:    Fever, Headache, and Neck Pain        Interval History:      Patient is sitting up in bed on room air in no apparent distress.  Reports that he is feeling overall better today with headache and improvement of neck pain.  Denies shortness of breath or cough.  Denies sore throat or difficulty swallowing. Admits to some nausea but denies vomiting.  Admits to some loose bowel movements yesterday after having received laxatives.  Neck is supple but mildly tender to palpation.  Lungs clear to auscultation bilaterally.  Abdomen is soft, nontender.  Fever is resolved.  WBC remains normal at 7.35.  Bartonella antibody panel on 2023 is in progress.  Lyme disease total antibody with reflex was negative.  Rickettsia species DNA PCR on 2023 is in progress.  Ehrlichia profile DNA PCR on 2023 is in progress.  Blood BCF microti antibody panel on 2023 is in progress.  CSF fluid culture on 2023 is so far showing no growth.  CT of the soft tissues of the neck on 2023 showed that there might be minimal left tonsillitis with no evidence of parapharyngeal or peritonsillar abscess.  No lymphadenopathy is identified.  Straightening of the normal cervical lordosis is likely due to muscle spasm.  Blood cultures on 6/15/2023 are so far showing no growth.  Blood cultures on 2023 are in progress.    Review of Systems:    Constitutional: no fever, chills and night sweats.  Generalized fatigue.  Eyes: no eye drainage, itching or redness.  HEENT: no mouth sores, dysphagia or nose bleed.  Neck pain and stiffness,  improved.  Respiratory: no for shortness of breath, cough or production of sputum.  Cardiovascular: no chest pain, no palpitations, no orthopnea.  Gastrointestinal: no nausea, vomiting or diarrhea. No abdominal pain, hematemesis or rectal bleeding.  Genitourinary: no dysuria or polyuria.  Hematologic/lymphatic: no lymph node abnormalities, no easy bruising or easy bleeding.  Musculoskeletal: no muscle or joint pain.  Skin: No rash and no itching.  Neurological: no loss of consciousness, no seizure, no headache.  Behavioral/Psych: no depression or suicidal ideation.  Endocrine: no hot flashes.  Immunologic: negative.    ----------------------------------------------------------------------------------------------------------------------      Objective       [facility] Meds:  azithromycin, 250 mg, Oral, Q24H  cyclobenzaprine, 10 mg, Oral, TID  doxycycline, 100 mg, Oral, Q12H  enoxaparin, 40 mg, Subcutaneous, Nightly  senna-docusate sodium, 2 tablet, Oral, BID  sodium chloride, 10 mL, Intravenous, Q12H      lactated ringers, 100 mL/hr, Last Rate: 100 mL/hr (06/18/23 0603)  Pharmacy Consult,       ----------------------------------------------------------------------------------------------------------------------    Vital Signs:  Temp:  [98.2 °F (36.8 °C)-98.7 °F (37.1 °C)] 98.3 °F (36.8 °C)  Heart Rate:  [] 56  Resp:  [16-20] 16  BP: (111-132)/(55-79) 111/62  No data found.  SpO2 Percentage    06/17/23 2242 06/18/23 0238 06/18/23 0643   SpO2: 99% 99% 99%     SpO2:  [94 %-99 %] 99 %  on   ;   Device (Oxygen Therapy): room air    Body mass index is 37.52 kg/m².  Wt Readings from Last 3 Encounters:   06/16/23 115 kg (254 lb 1.6 oz)   06/15/23 112 kg (248 lb)   04/04/23 115 kg (253 lb)        Intake/Output Summary (Last 24 hours) at 6/18/2023 0900  Last data filed at 6/18/2023 0300  Gross per 24 hour   Intake 4292 ml   Output --   Net 4292 ml     Diet: Regular/House Diet; Texture: Regular Texture (IDDSI 7);  Fluid Consistency: Thin (IDDSI 0)  ----------------------------------------------------------------------------------------------------------------------      Physical Exam:    Constitutional: Obese  male is sitting up in bed on room air in no apparent distress.  Appears comfortable.  HENT:  Head: Normocephalic and atraumatic.  Mouth:  Moist mucous membranes.    Eyes:  Conjunctivae and EOM are normal.  No scleral icterus.  Neck:  Neck supple.  No JVD present.  Some tenderness to palpation.  No rigidity.  Cardiovascular:  Normal rate, regular rhythm and normal heart sounds with no murmur. No edema.  Pulmonary/Chest:  No respiratory distress, no wheezes, no crackles, with normal breath sounds and good air movement.  Abdominal:  Soft.  Bowel sounds are normal.  No distension and no tenderness.   Musculoskeletal:  No edema, no tenderness, and no deformity.  No swelling or redness of joints.  Neurological:  Alert and oriented to person, place, and time.  No facial droop.  No slurred speech.   Skin:  Skin is warm and dry.  No rash noted.  No pallor.   Psychiatric:  Normal mood and affect.  Behavior is normal.  ----------------------------------------------------------------------------------------------------------------------  Results from last 7 days   Lab Units 06/16/23  0138 06/15/23  2306   CK TOTAL U/L 160  --    HSTROP T ng/L 6 6           Results from last 7 days   Lab Units 06/18/23  0523 06/17/23  0952 06/16/23  0138 06/15/23  2306   CRP mg/dL  --   --  10.02*  --    LACTATE mmol/L  --   --   --  1.2   WBC 10*3/mm3 7.35 9.25  --  16.48*   HEMOGLOBIN g/dL 12.3* 12.0*  --  14.4   HEMATOCRIT % 38.8 36.7*  --  43.8   MCV fL 86.8 85.9  --  85.5   MCHC g/dL 31.7 32.7  --  32.9   PLATELETS 10*3/mm3 315 285  --  371   INR   --   --   --  0.99     Results from last 7 days   Lab Units 06/17/23  0952 06/15/23  2306 06/15/23  1610   SODIUM mmol/L 139 138 138   POTASSIUM mmol/L 3.9 4.2 4.2   CHLORIDE mmol/L 106 102  103   CO2 mmol/L 21.3* 22.5 26.0   BUN mg/dL 15 15 18   CREATININE mg/dL 1.01 1.04 0.96   CALCIUM mg/dL 8.6 9.7 9.5   GLUCOSE mg/dL 93 115* 84   ALBUMIN g/dL 3.5 4.5 4.7   BILIRUBIN mg/dL 0.3 0.5 0.3   ALK PHOS U/L 65 81 77   AST (SGOT) U/L 23 23 16   ALT (SGPT) U/L 31 26 22   Estimated Creatinine Clearance: 131.3 mL/min (by C-G formula based on SCr of 1.01 mg/dL).  No results found for: AMMONIA    No results found for: HGBA1C, POCGLU  Lab Results   Component Value Date    HGBA1C 5.70 02/11/2019     Lab Results   Component Value Date    TSH 2.370 03/16/2023       Blood Culture   Date Value Ref Range Status   06/15/2023 No growth at 2 days  Preliminary   06/15/2023 No growth at 2 days  Preliminary     No results found for: URINECX  No results found for: WOUNDCX  No results found for: STOOLCX  No results found for: RESPCX  Pain Management Panel           No data to display                  ----------------------------------------------------------------------------------------------------------------------  Imaging Results (Last 24 Hours)       Procedure Component Value Units Date/Time    CT Soft Tissue Neck With Contrast [[id number]] Collected: 06/17/23 1507     Updated: 06/17/23 1513    Narrative:         Comparison: No previous.     Technique: CT soft tissue neck without IV contrast performed according  to as low as reasonably achieved dose protocol.     Findings: Evaluation for inflammation, infection and mass lesions is  limited by the lack of IV contrast.  Within this limitation, there are  small lymph nodes in the bilateral neck.  No lymphadenopathy is  appreciated.  The adenoid soft tissues appear within normal limits.   There may be minimal enlargement of the left tonsillar pillar raise a  question of minimal tonsillitis.  No evidence of peritonsillar or  parapharyngeal abscess.  The pharynx and larynx are otherwise normal.   The thyroid gland is unremarkable.  The lung apices are clear.     The bilateral  parotid and bilateral submandibular glands are normal and  symmetrical in appearance.  The paranasal sinuses, mastoid air cells and  middle ears are clear.  No acute dental abnormality is present.  No  fracture, dislocation or subluxation is noted.  There is straightening  of the normal cervical lordosis likely due to muscle spasm.  No lytic or  sclerotic destructive bone lesion is noted.  No deformities are noted to  suggest congenital abnormality or poorly healed remote fracture.       Impression:      Impression:  1.  There might be minimal left tonsillitis with no evidence of  parapharyngeal or peritonsillar abscess.  2.  No lymphadenopathy is identified.  3.  Straightening of the normal cervical lordosis is likely due to  muscle spasm.     This report was finalized on 6/17/2023 3:11 PM by Raymond Patel MD.               ----------------------------------------------------------------------------------------------------------------------    Pertinent Infectious Disease Results    Lactic acid on admission was normal at 1.2.  Procalcitonin on admission was normal at 0.08.  Urinalysis on 6/15/2023 was negative.  Meningitis/encephalitis panel PCR was negative.  5 nucleated cells seen on CSF cell count.  CSF culture is so far showing no growth.  Respiratory panel PCR was negative. CT head was unremarkable.  Chest x-ray was unremarkable. Bartonella antibody panel on 6/17/2023 is in progress.  Lyme disease total antibody with reflex was negative.  Rickettsia species DNA PCR on 6/16/2023 is in progress.  Ehrlichia profile DNA PCR on 6/16/2023 is in progress.  Blood BCF microti antibody panel on 6/17/2023 is in progress.  CSF fluid culture on 6/16/2023 is so far showing no growth.  CT of the soft tissues of the neck on 6/17/2023 showed that there might be minimal left tonsillitis with no evidence of parapharyngeal or peritonsillar abscess.  No lymphadenopathy is identified.  Straightening of the normal cervical  lordosis is likely due to muscle spasm.  Blood cultures on 6/15/2023 are so far showing no growth.  Blood cultures on 6/17/2023 are in progress.      Assessment/Plan       Assessment     SIRS criteria met on admission of unclear etiology  Fever  Meningitis/encephalitis, effectively ruled out  Concern for possible tickborne illness  Concern for possible Bartonella henselae infection    Plan      I examined the patient this morning and he is sitting up in bed on room air in no apparent distress.  Reports that he is feeling overall better today with headache and improvement of neck pain.  Denies shortness of breath or cough.  Denies sore throat or difficulty swallowing. Admits to some nausea but denies vomiting.  Admits to some loose bowel movements yesterday after having received laxatives.  Neck is supple but mildly tender to palpation.  Lungs clear to auscultation bilaterally.  Abdomen is soft, nontender.  Fever is resolved.  WBC remains normal at 7.35.  Bartonella antibody panel on 6/17/2023 is in progress.  Lyme disease total antibody with reflex was negative.  Rickettsia species DNA PCR on 6/16/2023 is in progress.  Ehrlichia profile DNA PCR on 6/16/2023 is in progress.  Blood BCF microti antibody panel on 6/17/2023 is in progress.  CSF fluid culture on 6/16/2023 is so far showing no growth.  CT of the soft tissues of the neck on 6/17/2023 showed that there might be minimal left tonsillitis with no evidence of parapharyngeal or peritonsillar abscess.  No lymphadenopathy is identified.  Straightening of the normal cervical lordosis is likely due to muscle spasm.  Blood cultures on 6/15/2023 are so far showing no growth.  Blood cultures on 6/17/2023 are in progress.    Recommend to continue on 7-day course of doxycycline for concern for possible tickborne illness.  Would be most concerned for [place] spotted fever with fever and headache.  Awaiting results of tick titers, but Lyme disease total antibody was  negative.  All other tick titers are pending.    As there was no lymphadenopathy noted on CT of the soft tissues of the neck, I have lower suspicion for cat scratch disease but Bartonella antibody panel is pending at this time and recommend to continue on 5-day course of azithromycin empirically.    Repeat blood cultures remain in progress, but fever has now resolved.    If patient were discharged today, would recommend to follow-up in the outpatient infectious disease clinic later this week to go over results of pending tests.    ANTIMICROBIAL THERAPY    azithromycin - 250 MG  doxycycline - 100 MG, 100 MG     Code Status:   Code Status and Medical Interventions:   Ordered at: 06/16/23 1052     Code Status (Patient has no pulse and is not breathing):    CPR (Attempt to Resuscitate)     Medical Interventions (Patient has pulse or is breathing):    Full Support       Neha Foster PA-C  06/18/23  09:00 EDT

## 2023-06-18 NOTE — DISCHARGE INSTR - LAB
Discharge follow up with Primary Care Provider Carol Boswell DO, [phone], in one week.     Follow up with Dr. Johns in one week

## 2023-06-18 NOTE — PLAN OF CARE
Goal Outcome Evaluation:  Plan of Care Reviewed With: patient       Goals met, patient being discharged.

## 2023-06-18 NOTE — DISCHARGE SUMMARY
[facility]  DISCHARGE SUMMARY    Date of Admission: 6/15/2023    Date of Discharge:  6/18/2023    PCP: Carol Boswell DO    Discharging Provider: MARY Gallegos  Attending Physician on day of DC: Ananth Garvey DO    Admission Diagnosis:   Please see admission H&P    Discharge Diagnosis:   SIRS/sepsis of unclear etiology (viral meningitis effectively ruled out)  Suspect possible tick-borne illness  DALLIN, compliant with CPAP  Obesity by BMI, 37.52 kg/m2    Procedures Performed:  Lumbar puncture on 6/16/2023  Chest x-ray  CT of the head without contrast  CT of the neck with contrast     Consults:   Consults       Date and Time Order Name Status Description    6/16/2023 10:46 AM Inpatient Infectious Diseases Consult Completed             HPI     History of Present Illness:  Rupert Bean is our 33 y.o. male patient admitted on 6/16/2023 due to SIRS/sepsis of uncertain etiology. He has a past medical history significant for DALLIN on CPAP and obesity by BMI. For further and complete admitting details, please see admission H&P.        Hospital Course     Hospital Course  Rupert Bean was admitted as outlined in above HPI.     Patient was admitted to the [facility] floor for further monitoring, evaluation, and treatment.  Presenting symptoms were concerning for viral meningitis.  Patient underwent LP in the emergency department.  Encephalitis/meningitis panel was negative, effectively ruling out viral meningitis. CSF was positive for protein and glucose. Culture remains pending. So far no growth. Peripheral blood cultures x2 obtained on arrival. No growth at 2 days. Repeat peripheral blood cultures were drawn on 6/17/2023. Pending. UA was non-concerning for UTI. Chest x-ray was unremarkable. There were no evidence of consolidations or pneumonia. CT of the head without contrast was also unremarkable.  CT of the neck with contrast noted possible minimal left  tonsillitis with no evidence of parapharyngeal or peritonsillar abscess. No lymphadenopathy. He received sepsis fluid bolus and IV antibiotics in the ED. Vancomycin and Rocephin were continued on admission, however ID consult was placed and they recommended oral doxycycline (empiric coverage for possible tickborne illness) and oral azithromycin (due to recent reported cat scratch). Tickborne panel and Bartonella panel pending although Lyme has resulted negative. Patient reported hiking and being outdoors frequently, although he did not recall any known tick bites. 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 He does have mild neck tenderness on exam today, although no rigidity. He states that Tylenol, Toradol, and Flexeril have helped to alleviate pain/stiffness. He has been afebrile for > 24 hours. VSS. Remaining alert and oriented x4. No neurological deficits. Feel that patient has currently reached maximum benefit of inpatient hospitalization and may be safely discharged home on this date. He will need to follow with ID closely in the outpatient setting and will need to present to ID clinic within 1 week. He will also need follow-up with PCP. Continue 5 day course of azithromycin and 7 day course of oral doxycycline as recommended by ID. They will discuss tickborne panel and other pending results with patient at follow-up. Discussed plans with patient. He voiced agreement and understanding with no further questions or concerns at this time. He denies any new or acute complaints and states that he feels comfortable returning home. Discussed with attending physician, Ananth Garvey DO.      Discussed with CHRISTIANO Smallwood on day of discharge.     Oxygen saturation on the day of discharge was 99% on room air.      Pertinent Laboratory and Radiology Results     Pertinent Test Results:          Results from last 7 days   Lab Units 06/18/23  0751  06/17/23  0952 06/15/23  2306 06/15/23  1610   WBC 10*3/mm3 7.35 9.25 16.48* 12.99*   HEMOGLOBIN g/dL 12.3* 12.0* 14.4 14.1   HEMATOCRIT % 38.8 36.7* 43.8 42.3   PLATELETS 10*3/mm3 315 285 371 381   MCV fL 86.8 85.9 85.5 83.6   SODIUM mmol/L  --  139 138 138   POTASSIUM mmol/L  --  3.9 4.2 4.2   CHLORIDE mmol/L  --  106 102 103   CO2 mmol/L  --  21.3* 22.5 26.0   BUN mg/dL  --  15 15 18   CREATININE mg/dL  --  1.01 1.04 0.96   GLUCOSE mg/dL  --  93 115* 84   CALCIUM mg/dL  --  8.6 9.7 9.5        Results from last 7 days   Lab Units 06/16/23  0138 06/15/23  2306   CK TOTAL U/L 160  --    HSTROP T ng/L 6 6     Results from last 7 days   Lab Units 06/18/23  0523 06/17/23 0952 06/16/23  0138 06/15/23  2306 06/15/23  1610   CRP mg/dL  --   --  10.02*  --   --    LACTATE mmol/L  --   --   --  1.2  --    WBC 10*3/mm3 7.35 9.25  --  16.48* 12.99*     Results from last 7 days   Lab Units 06/17/23  0952 06/15/23  2306 06/15/23  1610   BILIRUBIN mg/dL 0.3 0.5 0.3   ALK PHOS U/L 65 81 77   ALT (SGPT) U/L 31 26 22   AST (SGOT) U/L 23 23 16   PROTIME Seconds  --  13.6  --    INR   --  0.99  --    APTT seconds  --  33.0  --            Invalid input(s): TG, LDLCALC, LDLREALC      Brief Urine Lab Results  (Last result in the past 365 days)        Color   Clarity   Blood   Leuk Est   Nitrite   Protein   CREAT   Urine HCG        06/15/23 2335 Dark Yellow   Clear   Negative   Negative   Negative   100 mg/dL (2+)                               Pending Labs       Order Current Status    Antinuclear Antibodies Direct In process    Babesia Microti Antibody Panel In process    Bartonella Antibody Panel In process    Blood Culture - Blood, Arm, Right In process    Blood Culture - Blood, Arm, Right In process    Ehrlichia Profile DNA PCR In process    Rickettsia Species DNA, Real-Time PCR In process    Blood Culture - Blood, Arm, Left Preliminary result    Blood Culture - Blood, Arm, Right Preliminary result    Body Fluid Culture -  Cerebrospinal Fluid, Lumbar Puncture Preliminary result              ----------------------------------------------------------------------------------------------------------------------  CT Head Without Contrast    Result Date: 6/16/2023  1. No acute intracranial process.  This report was finalized on 6/16/2023 12:59 AM by Asa Mcnamara MD.      CT Soft Tissue Neck With Contrast    Result Date: 6/17/2023  Impression: 1.  There might be minimal left tonsillitis with no evidence of parapharyngeal or peritonsillar abscess. 2.  No lymphadenopathy is identified. 3.  Straightening of the normal cervical lordosis is likely due to muscle spasm.  This report was finalized on 6/17/2023 3:11 PM by Raymond Patel MD.      XR Chest 1 View    Result Date: 6/15/2023   1.  No acute process seen in the chest. 2.  No lobar consolidation or edema. 3.  No features of bronchial inflammation or hilar adenopathy. 4.  No free air in the upper abdomen. 5.  No fracture or foreign body.  This report was finalized on 6/15/2023 11:20 PM by Andrew Crespo MD.         Microbiology Results (last 10 days)       Procedure Component Value - Date/Time    Respiratory Panel PCR w/COVID-19(SARS-CoV-2) EDWIGE/TEJAL/ANASTASIYA/PAD/COR/MAD/UDAY In-House, NP Swab in UTM/VTM, 3-4 HR TAT - Swab, Nasopharynx [[id number]]  (Normal) Collected: 06/16/23 0659    Lab Status: Final result Specimen: Swab from Nasopharynx Updated: 06/16/23 0824     ADENOVIRUS, PCR Not Detected     Coronavirus 229E Not Detected     Coronavirus HKU1 Not Detected     Coronavirus NL63 Not Detected     Coronavirus OC43 Not Detected     COVID19 Not Detected     Human Metapneumovirus Not Detected     Human Rhinovirus/Enterovirus Not Detected     Influenza A PCR Not Detected     Influenza B PCR Not Detected     Parainfluenza Virus 1 Not Detected     Parainfluenza Virus 2 Not Detected     Parainfluenza Virus 3 Not Detected     Parainfluenza Virus 4 Not Detected     RSV, PCR Not Detected     Bordetella  pertussis pcr Not Detected     Bordetella parapertussis PCR Not Detected     Chlamydophila pneumoniae PCR Not Detected     Mycoplasma pneumo by PCR Not Detected    Narrative:      In the setting of a positive respiratory panel with a viral infection PLUS a negative procalcitonin without other underlying concern for bacterial infection, consider observing off antibiotics or discontinuation of antibiotics and continue supportive care. If the respiratory panel is positive for atypical bacterial infection (Bordetella pertussis, Chlamydophila pneumoniae, or Mycoplasma pneumoniae), consider antibiotic de-escalation to target atypical bacterial infection.    Body Fluid Culture - Cerebrospinal Fluid, Lumbar Puncture [[id number]] Collected: 06/16/23 0550    Lab Status: Preliminary result Specimen: Cerebrospinal Fluid from Lumbar Puncture Updated: 06/18/23 0600     Body Fluid Culture No growth at 2 days     Gram Stain No WBCs or organisms seen    Meningitis / Encephalitis Panel, PCR - Cerebrospinal Fluid, Lumbar Puncture [[id number]]  (Normal) Collected: 06/16/23 0550    Lab Status: Final result Specimen: Cerebrospinal Fluid from Lumbar Puncture Updated: 06/16/23 0744     ESCHERICHIA COLI K1, PCR Not Detected     HAEMOPHILUS INFLUENZAE, PCR Not Detected     LISTERIA MONOCYTOGENES, PCR Not Detected     NEISSERIA MENINGITIDIS, PCR Not Detected     STREPTOCOCCUS AGALACTIAE, PCR Not Detected     STREPTOCOCCUS PNEUMONIAE, PCR Not Detected     CYTOMEGALOVIRUS (CMV), PCR Not Detected     ENTEROVIRUS, PCR Not Detected     HERPES SIMPLEX VIRUS 1 (HSV-1), PCR Not Detected     HERPES SIMPLEX VIRUS 2 (HSV-2), PCR Not Detected     HUMAN PARECHOVIRUS, PCR Not Detected     VARICELLA ZOSTER VIRUS (VZV), PCR Not Detected     CRYPTOCOCCUS NEOFORMANS / GATTII, PCR Not Detected     HUMAN HERPES VIRUS 6 PCR Not Detected    Blood Culture - Blood, Arm, Left [[id number]]  (Normal) Collected: 06/15/23 2307    Lab Status: Preliminary result Specimen:  Blood from Arm, Left Updated: 06/17/23 2315     Blood Culture No growth at 2 days    Blood Culture - Blood, Arm, Right [[id number]]  (Normal) Collected: 06/15/23 2306    Lab Status: Preliminary result Specimen: Blood from Arm, Right Updated: 06/17/23 2315     Blood Culture No growth at 2 days            Labs above have been reviewed on the day of discharge.  Radiology images from prior 30 days were reviewed prior to discharge as incorporated into this document.     Discharge Vitals and Physical Examination       Vital Signs  Temp:  [98.2 °F (36.8 °C)-98.7 °F (37.1 °C)] 98.3 °F (36.8 °C)  Heart Rate:  [] 56  Resp:  [16-20] 16  BP: (111-132)/(55-79) 111/62     PHYSICAL EXAMINATION:   Physical Exam  Vitals and nursing note reviewed.   Constitutional:       General: He is awake.      Appearance: He is obese. He is not ill-appearing or diaphoretic.      Comments: Room air.   HENT:      Head: Normocephalic and atraumatic.      Mouth/Throat:      Mouth: Mucous membranes are moist.      Pharynx: Oropharynx is clear.   Eyes:      Extraocular Movements: Extraocular movements intact.      Pupils: Pupils are equal, round, and reactive to light.   Cardiovascular:      Rate and Rhythm: Regular rhythm. Bradycardia present.      Pulses: Normal pulses.           Dorsalis pedis pulses are 2+ on the right side and 2+ on the left side.      Heart sounds: Normal heart sounds. No murmur heard.    No friction rub.   Pulmonary:      Effort: Pulmonary effort is normal. No respiratory distress.      Breath sounds: Normal breath sounds. No wheezing, rhonchi or rales.   Abdominal:      General: Bowel sounds are normal. There is no distension.      Palpations: Abdomen is soft.      Tenderness: There is no abdominal tenderness. There is no guarding or rebound.   Musculoskeletal:      Cervical back: Tenderness present. No rigidity.      Right lower leg: No edema.      Left lower leg: No edema.   Skin:     General: Skin is warm and dry.       Capillary Refill: Capillary refill takes 2 to 3 seconds.      Coloration: Skin is not pale.   Neurological:      General: No focal deficit present.      Mental Status: He is alert and oriented to person, place, and time. Mental status is at baseline.      Cranial Nerves: No dysarthria or facial asymmetry.      Sensory: Sensation is intact. No sensory deficit.      Motor: Motor function is intact. No weakness, tremor or seizure activity.      Gait: Gait normal.   Psychiatric:         Attention and Perception: Attention and perception normal.         Mood and Affect: Mood and affect normal.         Speech: Speech normal.         Behavior: Behavior normal. Behavior is cooperative.         Thought Content: Thought content normal.         Cognition and Memory: Cognition and memory normal.         Judgment: Judgment normal.         Discharge Disposition, Discharge Medications, and Discharge Appointments     Discharge Disposition:   home    Condition on Discharge:  Good    Discharge Medications:     Discharge Medications        New Medications        Instructions Start Date   azithromycin 250 MG tablet  Commonly known as: ZITHROMAX   Take 1 tablet by mouth Daily for 3 days      cyclobenzaprine 10 MG tablet  Commonly known as: FLEXERIL   10 mg, Oral, 3 Times Daily      doxycycline 100 MG capsule  Commonly known as: MONODOX   Take 1 capsule by mouth Every 12 (Twelve) Hours for 10 doses      ketorolac 10 MG tablet  Commonly known as: TORADOL   10 mg, Oral, Every 6 Hours PRN             Changes to Medications        Instructions Start Date   acetaminophen 325 MG tablet  Commonly known as: TYLENOL  What changed:   medication strength  how much to take  when to take this   650 mg, Oral, Every 4 Hours PRN             Stop These Medications      aspirin 325 MG EC tablet     doxycycline 100 MG capsule  Commonly known as: VIBRAMYCIN     ibuprofen 200 MG tablet  Commonly known as: ADVIL,MOTRIN     naproxen sodium 220 MG  tablet  Commonly known as: ALEVE              Discharged medication regimen discussed with attending physician prior to discharge.     Discharge Diet:   regular diet  Dietary Orders (From admission, onward)       Start     Ordered    06/16/23 1243  Diet: Regular/House Diet; Texture: Regular Texture (IDDSI 7); Fluid Consistency: Thin (IDDSI 0)  Diet Effective Now        References:    Diet Order [place]   Question Answer Comment   Diets: Regular/House Diet    Texture: Regular Texture (IDDSI 7)    Fluid Consistency: Thin (IDDSI 0)        06/16/23 1242                    Activity at Discharge:  activity as tolerated         Discharge Disposition:    Home or Self Care        Follow-up Appointments:  Your Scheduled Appointments      Jun 23, 2023 11:00 AM  Follow Up with KIKO Child  [facility] ([place]) [address] [facility] [address] [phone]   Please bring all outside imaging (x-ray, CT, MRI that were not done at a [facility] location) discs and reports along with 's license and insurance cards.         Sep 20, 2023 10:15 AM  Office Visit with Carol Boswell DO  [facility] ([place]) [address]  [facility] [address] [phone]   Arrive 15 minutes prior to appointment.  If you are in need of a language or hearing  please call the Department.                Additional Instructions for the Follow-ups that You Need to Schedule       Discharge Follow-up with PCP   As directed       Currently Documented PCP:    Carol Boswell DO    PCP Phone Number:    [phone]     Follow Up Details: 1 week post hospital discharge follow up         Discharge Follow-up with Specified Provider: Dr. Johns (ID); 1 Week   As directed      To: Dr. Johns (ID)    Follow Up: 1 Week                Follow-up Information       Carol Boswell DO .    Specialties: Family Medicine, Hospitalist  Why: 1 week post  hospital discharge follow up  Contact information:  [address]  [facility] [address]  [phone]               Marquis Rahman MD .    Specialty: Family Medicine  Why: 1 week post hospital discharge follow up  Contact information:  [address] [facility] [address]  [phone]                             Additional Instructions for the Follow-ups that You Need to Schedule       Discharge Follow-up with PCP   As directed       Currently Documented PCP:    Carol Boswell DO    PCP Phone Number:    [phone]     Follow Up Details: 1 week post hospital discharge follow up         Discharge Follow-up with Specified Provider: Dr. Johns (ID); 1 Week   As directed      To: Dr. Johns (ID)    Follow Up: 1 Week                 Test Results Pending at Discharge:    Pending Labs       Order Current Status    Antinuclear Antibodies Direct In process    Babesia Microti Antibody Panel In process    Bartonella Antibody Panel In process    Blood Culture - Blood, Arm, Right In process    Blood Culture - Blood, Arm, Right In process    Ehrlichia Profile DNA PCR In process    Rickettsia Species DNA, Real-Time PCR In process    Blood Culture - Blood, Arm, Left Preliminary result    Blood Culture - Blood, Arm, Right Preliminary result    Body Fluid Culture - Cerebrospinal Fluid, Lumbar Puncture Preliminary result               MARY Gallegos   [facility] Medicine Team  06/18/23  09:30 EDT      Time: Greater than 30 minutes spent on this discharge.  I spent 40 minutes on this discharge activity which included:  Face-to-face encounter with the patient, discussing plan with attending physician, reviewing the data in the system, coordination of the care with the nursing staff as well as consultations, documentation, and entering orders.

## 2023-06-18 NOTE — PLAN OF CARE
Goal Outcome Evaluation:              Outcome Evaluation: Pt resting in bed at this time. PRN medication given for neck pain. Pt ambulates independently. Will continue POC.

## 2023-06-19 ENCOUNTER — TELEPHONE (OUTPATIENT)
Dept: MEDSURG UNIT | Facility: HOSPITAL | Age: 33
End: 2023-06-19
Payer: COMMERCIAL

## 2023-06-19 ENCOUNTER — TRANSITIONAL CARE MANAGEMENT TELEPHONE ENCOUNTER (OUTPATIENT)
Dept: CALL CENTER | Facility: HOSPITAL | Age: 33
End: 2023-06-19
Payer: COMMERCIAL

## 2023-06-19 LAB
ANA SER QL: POSITIVE
QT INTERVAL: 452 MS
QTC INTERVAL: 439 MS

## 2023-06-19 NOTE — OUTREACH NOTE
Call Center TCM Note      Flowsheet Row Responses   [facility] patient discharged from? [facility]   Does the patient have one of the following disease processes/diagnoses(primary or secondary)? Sepsis   TCM attempt successful? Yes   Call start time 1539   Call end time 1546   Discharge diagnosis SIRS/sepsis of unclear etiology (viral meningitis effectively ruled out) Suspect possible tick-borne illness   Person spoke with today (if not patient) and relationship Patient   Meds reviewed with patient/caregiver? Yes  [New: azithromycin, doxycycline monohydrate, cyclobenzaprine, toradol.    Dc'd aspirin, ibuprofen, naproxen, doxycycline hyclate]   Does the patient have all medications related to this admission filled (includes all antibiotics, inhalers, nebulizers,steroids,etc.) Yes   Is the patient taking all medications as directed (includes completed medication regime)? Yes   Comments PCP Dr Boswell. Hospital follow up scheduled for 6/22  1030am with PCP.   Does the patient have an appointment with their PCP within 7 days of discharge? Yes   Has home health visited the patient within 72 hours of discharge? N/A   Psychosocial issues? No   Did the patient receive a copy of their discharge instructions? Yes   Nursing interventions Reviewed instructions with patient   What is the patient's perception of their health status since discharge? Improving  [Patient reports doing Ok, just tired and hurts. ]   Is the patient/caregiver able to teach back TIME? T emperature - higher or lower than normal   Nursing interventions Nurse provided patient education   Is patient/caregiver able to teach back steps to recovery at home? Set small, achievable goals for return to baseline health, Rest and regain strength, Eat a balanced diet  [Patient reports eating and drinking OK.]   Is the patient/caregiver able to teach back signs and symptoms of worsening condition: Fever  [Denies any fever. Advised to monitor with chills, shivers.]    If the patient is a current smoker, are they able to teach back resources for cessation? Not a smoker   Is the patient/caregiver able to teach back the hierarchy of who to call/visit for symptoms/problems? PCP, Specialist, Home health nurse, Urgent Care, ED, 911 Yes   TCM call completed? Yes   Wrap up additional comments Ortho appt 6/23. Patient to contact infectious disease Dr Johns and schedule one week appt.   Call end time 1546   Would this patient benefit from a Referral to [facility]? No   Is the patient interested in additional calls from an ambulatory ?  NOTE:  applies to high risk patients requiring additional follow-up. No            Maida Cano, RN    6/19/2023, 15:50 EDT

## 2023-06-19 NOTE — CASE MANAGEMENT/SOCIAL WORK
Case Management Discharge Note      Final Note: Patient was discharged home on 6/18/23.  No needs identified.         Selected Continued Care - Discharged on 6/18/2023 Admission date: 6/15/2023 - Discharge disposition: Home or Self Care       Final Discharge Disposition Code: 01 - home or self-care

## 2023-06-20 LAB
BACTERIA SPEC AEROBE CULT: NORMAL
BACTERIA SPEC AEROBE CULT: NORMAL

## 2023-06-20 NOTE — PAYOR COMM NOTE
"[facility]  FRANCO BLISS  PHONE  [phone]  FAX  [phone]  NPI:  [id number]    PENDING REF#[id number]    Guanakito Bose (33 y.o. Male)       Date of Birth   1990    Social Security Number       Address   [address] [place] [address]    Home Phone   [phone]    MRN   [id number]       [employer]   None    Marital Status   Single                            Admission Date   6/15/23    Admission Type   Emergency    Admitting Provider   Ananth Rahman DO    Attending Provider       Department, Room/Bed   [address], 3350/2S       Discharge Date   6/18/2023    Discharge Disposition   Home or Self Care    Discharge Destination                                 Attending Provider: (none)   Allergies: No Known Allergies    Isolation: None   Infection: None   Code Status: Prior    Ht: 175.3 cm (69\")   Wt: 115 kg (254 lb 1.6 oz)    Admission Cmt: None   Principal Problem: Viral meningitis [A87.9]                   Active Insurance as of 6/15/2023       Primary Coverage       Payor Plan Insurance Group Employer/Plan Group    [id number]       Payor Plan Address Payor Plan Phone Number Payor Plan Fax Number Effective Dates    [address] [address] [phone]  1/1/2022 - None Entered    [facility] [id number]         Subscriber Name Subscriber Birth Date Member ID       GUANAKITO BOSE 1990 [id number]                     Emergency Contacts        (Rel.) Home Phone Work Phone Mobile Phone    Lisa Bose (Mother) [phone] -- --                 History & Physical        April Stokes APRN at 06/16/23 1237       Attestation signed by Ananth Rahman DO at 06/16/23 1734    I have evaluated the patient independently, I have reviewed H&P, all labs and images from today and evaluated the patient at bedside.  I have discussed marlen/ MARY Gallegos and agree.  33-year-old male with neck pain/stiffness and headache.  Reported a fever but " Tmax 99 degrees at home.  Has not been febrile on this admission.  LP performed in the ER, most likely viral etiology.  Encephalitis/meningitis profile including VZV, HSV all negative.  Leukocytosis noted today most likely from Decadron vs reactive. Empirically continued IV antibiotics along with doxycycline for tickborne serology which still is pending.  ID consulted and appreciate recommendations                      [facility] Medicine Services  History & Physical    Patient Identification:  Name:  Rupert Bean  Age:  33 y.o.  Sex:  male  :  1990  MRN:  [id number]   Visit Number:  [id number]  Admit Date: 6/15/2023   Primary Care Physician:  Carol Boswell DO    Subjective     Chief complaint: Neck pain/stiffness, fever, and headache    History of presenting illness:      Rupert Bean is a 33 y.o. male with past medical history significant for DALLIN on CPAP and obesity by BMI.  Patient presents to [facility] emergency department today for further evaluation of neck pain/stiffness, low grade fever, and headache. Symptoms have been ongoing for approximately 3 days with no precipitating event. No injury, fall, or known tick bites. Patient states that he has been hiking a lot, but has not noticed any ticks. He denies use of IV drugs or other illicit substances. Denies having any open wounds, sores, or rashes. Due to symptoms concerning for viral meningitis, patient underwent LP in the ED. Meningitis/encephalitis panel negative. CSF + for glucose and protein. CSF culture pending. Patient met SIRS/sepsis criteria with HR>90, WBCs>12, and C-RP elevation. Patient denies any other acute complaints such as chest pain, shortness of breath, cough, sputum production, abdominal pain, N/V/D, constipation, or dysuria. He also denies any peripheral numbness or tingling, vision changes/visual disturbance, dizziness, and syncope. He does report some mild fatigue on exertion. Neck  pain/headache is made worse by movement. He states that ibuprofen/tylenol have helped to alleviate pain. He states that neck pain radiates to the right shoulder. HS Troponin T negative x2 and EKG was without evidence of acute ischemia.  He reports strong family history of Rheumatoid arthritis and mother at bedside states that she has it along with patient's brother who was recently diagnosed with RA at a young age (30s). RF and PEYTON direct ordered as well. We will [facility] patient [facility] for continued monitoring, evaluation, and treatment. Discussed with patient and his mother. They voiced agreement and understanding with no further questions or concerns at this time.     Upon arrival to the ED, vital signs were temperature 98.7, pulse 121, respirations 20, blood pressure 119/55 sitting, SPO2 saturation 96% on room air.  High-sensitivity troponin T negative x2.  CMP with glucose 115, total protein 8.6, otherwise unremarkable.  Lactate 1.2.  Procalcitonin 0.08.  PT 13.6, INR 0.99.  CBC with WBC 16.48, RDW 12.2, otherwise unremarkable.  Urinalysis with trace ketones, 2+ protein, 1+ bilirubin, 35 RBCs, 35 WBCs.  Respiratory panel negative.  Peripheral blood cultures x2 pending.  Meningitis/encephalitis panel PCR negative VZV negative.  Body fluid culture from LP pending.  Cerebrospinal fluid clear, Karalis.  71 glucose, 86.5 protein.  Chest x-ray with no acute process in the chest.  No lobar consolidation or edema.  No features of bronchial inflammation or hilar adenopathy.  No free air in the upper abdomen.  No fracture or foreign body.  CT of the head without contrast unremarkable.    Known Emergency Department medications received prior to my evaluation included 1 g oral Tylenol, 2 g IV Rocephin, 5 mg IV dexamethasone sodium phosphate, 12.5 mg IV Benadryl, 30 mg IV Toradol x2, 5 mg IV Reglan, 3.375 g IV Zosyn, 200 mg IV to prevent, 2190 mL normal saline bolus, 2500 mg IV vancomycin, and milligram ibuprofen. Room  location at the time of my evaluation was 350B.  Discussed with admitting physician, Ananth Garvey DO.    ---------------------------------------------------------------------------------------------------------------------   Review of Systems   Constitutional:  Positive for chills, fatigue and fever.   HENT:  Negative for congestion, sore throat and trouble swallowing.    Respiratory:  Negative for cough, shortness of breath and wheezing.    Cardiovascular:  Negative for chest pain, palpitations and leg swelling.   Gastrointestinal:  Negative for abdominal pain, constipation, diarrhea, nausea and vomiting.   Genitourinary:  Negative for difficulty urinating, flank pain, frequency and urgency.   Musculoskeletal:  Positive for arthralgias (right shoulder), neck pain and neck stiffness. Negative for back pain, gait problem and joint swelling.   Skin:  Negative for color change, rash and wound.   Neurological:  Positive for headaches. Negative for dizziness, tremors, seizures, syncope, facial asymmetry, speech difficulty, weakness, light-headedness and numbness.   Hematological:  Negative for adenopathy. Does not bruise/bleed easily.    ---------------------------------------------------------------------------------------------------------------------   Past Medical History:   Diagnosis Date    Broken leg     Bronchitis     History of ear infections      Past Surgical History:   Procedure Laterality Date    FRACTURE SURGERY Right 1994     Family History   Problem Relation Age of Onset    Cancer Mother     Hypertension Mother     Cancer Father     Anuerysm Father     Diabetes Brother     Diabetes Maternal Grandfather     Heart disease Maternal Grandfather      Social History     Socioeconomic History    Marital status: Single   Tobacco Use    Smoking status: Never    Smokeless tobacco: Never   Vaping Use    Vaping Use: Never used   Substance and Sexual Activity    Alcohol use: Yes     Alcohol/week: 1.0  standard drink     Types: 1 Cans of beer per week     Comment: not weekly, once a month    Drug use: No    Sexual activity: Not Currently     ---------------------------------------------------------------------------------------------------------------------   Allergies:  Patient has no known allergies.  ---------------------------------------------------------------------------------------------------------------------   Home medications:    Medications below are reported home medications pulling from within the system; at this time, these medications have not been reconciled unless otherwise specified and are in the verification process for further verifcation as current home medications.  Medications Prior to Admission   Medication Sig Dispense Refill Last Dose    acetaminophen (TYLENOL) 500 MG tablet Take 1 tablet by mouth Every 6 (Six) Hours As Needed for Mild Pain.   6/16/2023    ibuprofen (ADVIL,MOTRIN) 200 MG tablet Take 4 tablets by mouth Every 6 (Six) Hours As Needed for Mild Pain.   6/16/2023    naproxen sodium (ALEVE) 220 MG tablet Take 1 tablet by mouth 2 (Two) Times a Day As Needed for Mild Pain, Fever or Headache.   Past Week    aspirin 325 MG EC tablet Take 1 tablet by mouth Every 6 (Six) Hours As Needed for Mild Pain or Fever.   Unknown       Hospital Scheduled Meds:  cefTRIAXone, 2 g, Intravenous, Q24H  vancomycin, 2,500 mg, Intravenous, Once   Followed by  [START ON 6/17/2023] vancomycin, 1,500 mg, Intravenous, Q12H           Current listed hospital scheduled medications may not yet reflect those currently placed in orders that are signed and held awaiting patient's arrival to floor.   ---------------------------------------------------------------------------------------------------------------------     Objective     Vital Signs:  Temp:  [98.4 °F (36.9 °C)-99 °F (37.2 °C)] 98.6 °F (37 °C)  Heart Rate:  [] 98  Resp:  [18-20] 20  BP: ()/(55-82) 123/68      06/15/23  1364   Weight: 113  kg (250 lb)     Body mass index is 35.87 kg/m².  ---------------------------------------------------------------------------------------------------------------------       Physical Exam  Vitals reviewed.   Constitutional:       General: He is awake. He is not in acute distress.     Appearance: He is obese. He is not ill-appearing or diaphoretic.      Comments: Room air.   HENT:      Head: Normocephalic and atraumatic.      Mouth/Throat:      Mouth: Mucous membranes are moist.      Pharynx: Oropharynx is clear.   Eyes:      Extraocular Movements: Extraocular movements intact.      Pupils: Pupils are equal, round, and reactive to light.   Cardiovascular:      Rate and Rhythm: Normal rate and regular rhythm.      Pulses: Normal pulses.           Dorsalis pedis pulses are 2+ on the right side and 2+ on the left side.      Heart sounds: Normal heart sounds. No murmur heard.    No friction rub.   Pulmonary:      Effort: Pulmonary effort is normal. No accessory muscle usage, respiratory distress or retractions.      Breath sounds: No wheezing, rhonchi or rales.   Abdominal:      General: Bowel sounds are normal. There is no distension.      Palpations: Abdomen is soft.      Tenderness: There is no abdominal tenderness. There is no guarding.   Musculoskeletal:         General: No swelling or signs of injury.      Cervical back: Rigidity and tenderness present.      Right lower leg: No edema.      Left lower leg: No edema.   Lymphadenopathy:      Cervical: No cervical adenopathy.   Skin:     General: Skin is warm and dry.      Capillary Refill: Capillary refill takes 2 to 3 seconds.      Coloration: Skin is not pale.      Findings: No bruising, erythema, lesion or rash.   Neurological:      General: No focal deficit present.      Mental Status: He is alert and oriented to person, place, and time. Mental status is at baseline.      Cranial Nerves: No cranial nerve deficit, dysarthria or facial asymmetry.      Sensory:  Sensation is intact. No sensory deficit.      Motor: Motor function is intact. No weakness, tremor or seizure activity.      Coordination: Coordination normal. Finger-Nose-Finger Test normal.      Gait: Gait is intact.   Psychiatric:         Attention and Perception: Attention and perception normal.         Mood and Affect: Mood and affect normal.         Speech: Speech normal.         Behavior: Behavior normal. Behavior is cooperative.         Thought Content: Thought content normal.         Cognition and Memory: Cognition and memory normal.         Judgment: Judgment normal.       ---------------------------------------------------------------------------------------------------------------------  EKG:  Pending formal cardiology interpretation. Per my review, appears sinus tachycardia 100s with no evidence of acute ischemia.       Telemetry:  Appearing normal sinus rhythm 90s on my exam.   I have personally looked at both the EKG and the telemetry strips.  ---------------------------------------------------------------------------------------------------------------------   Results from last 7 days   Lab Units 06/15/23  2306 06/15/23  1610   LACTATE mmol/L 1.2  --    WBC 10*3/mm3 16.48* 12.99*   HEMOGLOBIN g/dL 14.4 14.1   HEMATOCRIT % 43.8 42.3   MCV fL 85.5 83.6   MCHC g/dL 32.9 33.3   PLATELETS 10*3/mm3 371 381   INR  0.99  --          Results from last 7 days   Lab Units 06/15/23  2306 06/15/23  1610   SODIUM mmol/L 138 138   POTASSIUM mmol/L 4.2 4.2   CHLORIDE mmol/L 102 103   CO2 mmol/L 22.5 26.0   BUN mg/dL 15 18   CREATININE mg/dL 1.04 0.96   CALCIUM mg/dL 9.7 9.5   GLUCOSE mg/dL 115* 84   ALBUMIN g/dL 4.5 4.7   BILIRUBIN mg/dL 0.5 0.3   ALK PHOS U/L 81 77   AST (SGOT) U/L 23 16   ALT (SGPT) U/L 26 22   Estimated Creatinine Clearance: 128.4 mL/min (by C-G formula based on SCr of 1.04 mg/dL).  No results found for: AMMONIA  Results from last 7 days   Lab Units 06/16/23  0138 06/15/23  2306   HSTROP T ng/L 6  6         Lab Results   Component Value Date    HGBA1C 5.70 02/11/2019     Lab Results   Component Value Date    TSH 2.370 03/16/2023     No results found for: PREGTESTUR, PREGSERUM, HCG, HCGQUANT  Pain Management Panel           No data to display                    ---------------------------------------------------------------------------------------------------------------------  Imaging Results (Last 7 Days)       Procedure Component Value Units Date/Time    CT Head Without Contrast [[id number]] Collected: 06/16/23 0053     Updated: 06/16/23 0101    Narrative:      Examination: CT head without contrast     CLINICAL HISTORY: Fever and headache     COMPARISON: None     TECHNIQUE: Contiguous 3 mm thick slices were obtained from the skull  base to the vertex without contrast. Coronal and sagittal reformats were  performed.     FINDINGS: Brain volume is normal for age. There is no acute intracranial  hemorrhage. No acute territorial infarct. No extra-axial collection. No  shift of midline structures. The visualized paranasal sinuses and  mastoid air cells are largely clear.       Impression:      1. No acute intracranial process.     This report was finalized on 6/16/2023 12:59 AM by Asa Mcnamara MD.       XR Chest 1 View [[id number]] Collected: 06/15/23 2319     Updated: 06/15/23 2322    Narrative:      Procedure: Portable chest x-ray examination performed on 06/15/2023.  Single view. Upright position.     HISTORY: Severe sepsis. Evaluate lungs.     FINDINGS:     Normal heart size.  No lobar consolidation or edema.  No pleural effusion or pneumothorax.  Normal cardiac mediastinal size and contour.  No hilar adenopathy.  Very slightly hypoinflated lungs.  No fracture or foreign body.  No free air in the upper abdomen.       Impression:         1.  No acute process seen in the chest.  2.  No lobar consolidation or edema.  3.  No features of bronchial inflammation or hilar adenopathy.  4.  No free air in the upper  abdomen.  5.  No fracture or foreign body.     This report was finalized on 6/15/2023 11:20 PM by Andrew Crespo MD.             Last echocardiogram: No previous echo on file.      I have personally reviewed the above radiology images and read the final radiology report on 06/16/23  ---------------------------------------------------------------------------------------------------------------------  Assessment / Plan     Active Hospital Problems    Diagnosis  POA    **Viral meningitis [A87.9]  Yes       ASSESSMENT/PLAN:  -SIRS/sepsis criteria met on admission of unclear etiology  -Symptoms concerning for viral meningitis, effectively ruled out  -Concern for possible tick-borne illness  -Met SIRS criteria with HR>90, WBCs>12, and C-RP elevation. Procal and lactate non-elevated.   -UA overall non concerning for UTI. Chest xray unremarkable. No evidence of consolidations/pneumonia.   -CT head without contrast negative for acute intracranial abnormality.   -Peripheral blood cultures x2 pending.   -Received sepsis fluid bolus and IV abx in the ED. Continued Vancomycin and rocephin on admission (per attending provider) for empiric treatment pending further workup.  -Oral doxycycline for empiric treatment of possible tick-borne illness.   -Patient underwent LP in the ED and tolerated procedure well. CSF culture pending. CSF + for glucose and protein. Meningitis/encephalitis panel negative. Respiratory panel negative.   -LFTs, total bili, PT/INR all within normal limits. Tick-borne panel sent, although patient denies known recent contact with ticks.   -PEYTON direct and RF ordered to rule out autoimmune related process as he has strong family history of RA and Lupus. Sed rate mildly elevated at 33.   -Tylenol or Toradol PRN for pain/mild fever.   -Obtain VS per hospital policy.   -Repeat CBC with differential and CMP in the a.m.   -Infectious Disease consult placed. Greatly appreciate their input and assistance.     -DALLIN,  compliant with CPAP  -Obesity by BMI, Body mass index is 37.52 kg/m².  -Affecting all aspects of care.  -Encourage lifestyle modifications.     ----------  -DVT prophylaxis: Lovenox.  -Activity: As tolerated. Fall precautions.   -Expected length of stay:   INPATIENT status due to the need for care which can only be reasonably provided in an hospital setting such as aggressive/expedited ancillary services and/or consultation services, the necessity for IV medications, close physician monitoring and/or the possible need for procedures.  In such, I feel patient's risk for adverse outcomes and need for care warrant INPATIENT evaluation and predict the patient's care encounter to likely last beyond 2 midnights.   -Disposition plans to return home on discharge once medically stable.    High risk secondary to SIRS/sepsis of unclear etiology with c/o neck stiffness, neck pain, fever, and headache. Meningitis effectively ruled out. Pending further workup.       CODE STATUS: FULL CODE      April Stokes, MARY   06/16/23  12:37 EDT  Pager #[phone]  ---------------------------------------------------------------------------------------------------------------------        Electronically signed by Ananth Rahman DO at 06/16/23 1738          Emergency Department Notes        Micky Johnson RN at 06/16/23 1127          Report given to TANYA Maher on 3N.     Electronically signed by Micky Johnson RN at 06/16/23 7895       Mulu Welsh RN at 06/16/23 0456          Informed consent for conscious sedation for lumbar puncture obtained at this time.     Electronically signed by Mulu Welsh, RN at 06/16/23 0457       Gomez Bills MD at 06/16/23 0213        Procedure Orders    1. Procedural Sedation [[id number]] ordered by Gomez Bills MD    2. Lumbar Puncture [[id number]] ordered by Gomez Bills MD                 Subjective   History of Present Illness  53-year-old male  presents to the ER due to concerns for increasing headache and neck pain.  Symptoms have been present for the last 2 to 3 days.  Patient noted he was evaluated by his PCP and started on doxycycline due to concerns for possible otitis media.  He noted his PCP had negative viral screening results as well.  Patient noted symptoms have been persistent and he was counseled to report to the ER for further work-up.  Patient had subjective fever but is afebrile on arrival.  Patient notes decreased ability to flex his neck forward.  Denied significant nausea or vomiting.  No chest pain or shortness of breath.  Headache noted.  Mild diplopia.  Vital signs stable.  Afebrile    Review of Systems   Musculoskeletal:  Positive for neck pain.   Neurological:  Positive for headaches.   All other systems reviewed and are negative.    Past Medical History:   Diagnosis Date    Broken leg     Bronchitis     History of ear infections        No Known Allergies    Past Surgical History:   Procedure Laterality Date    FRACTURE SURGERY Right 1994       Family History   Problem Relation Age of Onset    Cancer Mother     Hypertension Mother     Cancer Father     Anuerysm Father     Diabetes Brother     Diabetes Maternal Grandfather     Heart disease Maternal Grandfather        Social History     Socioeconomic History    Marital status: Single   Tobacco Use    Smoking status: Never    Smokeless tobacco: Never   Vaping Use    Vaping Use: Never used   Substance and Sexual Activity    Alcohol use: Yes     Alcohol/week: 1.0 standard drink     Types: 1 Cans of beer per week     Comment: not weekly, once a month    Drug use: No    Sexual activity: Not Currently           Objective   Physical Exam  Constitutional:       General: He is not in acute distress.     Appearance: He is well-developed. He is not ill-appearing.   HENT:      Head: Normocephalic and atraumatic.   Eyes:      Extraocular Movements: Extraocular movements intact.      Pupils: Pupils  are equal, round, and reactive to light.   Neck:      Vascular: No JVD.   Cardiovascular:      Rate and Rhythm: Normal rate and regular rhythm.      Heart sounds: Normal heart sounds. No murmur heard.  Pulmonary:      Effort: No tachypnea, accessory muscle usage or respiratory distress.      Breath sounds: Normal breath sounds. No stridor. No decreased breath sounds, wheezing, rhonchi or rales.   Chest:      Chest wall: No deformity, tenderness or crepitus.   Abdominal:      General: Bowel sounds are normal.      Palpations: Abdomen is soft.      Tenderness: There is no abdominal tenderness. There is no guarding or rebound.   Musculoskeletal:         General: Normal range of motion.      Cervical back: Normal range of motion and neck supple.      Right lower leg: No tenderness. No edema.      Left lower leg: No tenderness. No edema.   Lymphadenopathy:      Cervical: No cervical adenopathy.   Skin:     General: Skin is warm and dry.      Coloration: Skin is not cyanotic.      Findings: No ecchymosis or erythema.   Neurological:      General: No focal deficit present.      Mental Status: He is alert and oriented to person, place, and time.      Cranial Nerves: No cranial nerve deficit.      Motor: No weakness.      Comments: Patient is able to flex his neck approximately 45 degrees.  Patient notes he is unable to touch his chin to his chest due to neck pain.  Patient has no neck pain with flexion of his hips   Psychiatric:         Mood and Affect: Mood normal. Mood is not anxious.         Behavior: Behavior normal. Behavior is not agitated.       Lumbar Puncture    Date/Time: 6/16/2023 6:28 AM  Performed by: Gomez Bills MD  Authorized by: Gomez Bills MD     Consent:     Consent obtained:  Written    Consent given by:  Patient  Universal protocol:     Patient identity confirmed:  Arm band  Pre-procedure details:     Procedure purpose:  Diagnostic    Preparation: Patient was prepped and draped in usual  sterile fashion    Anesthesia:     Anesthesia method:  Local infiltration    Local anesthetic:  Lidocaine 1% w/o epi  Procedure details:     Lumbar space:  L4-L5 interspace    Patient position:  L lateral decubitus    Needle gauge:  22    Needle type:  Spinal needle - Quincke tip    Needle length (in):  3.5    Ultrasound guidance: no      Number of attempts:  1    Fluid appearance:  Clear    Tubes of fluid:  4    Total volume (ml):  8  Post-procedure details:     Puncture site:  Adhesive bandage applied    Procedure completion:  Tolerated well, no immediate complications  Procedural Sedation    Date/Time: 6/16/2023 6:29 AM  Performed by: Gomez Bills MD  Authorized by: Gomez Bills MD     Consent:     Consent obtained:  Written    Consent given by:  Patient    Alternatives discussed:  Analgesia without sedation  Universal protocol:     Patient identity confirmed:  Arm band  Indications:     Procedure performed:  Lumbar puncture    Procedure necessitating sedation performed by:  Physician performing sedation    Intended level of sedation:  Moderate  Pre-sedation assessment:     Time since last food or drink:  4 hrs    ASA classification: class 1 - normal, healthy patient      Mouth opening:  3 or more finger widths    Thyromental distance:  3 finger widths    Mallampati score:  III - soft palate, base of uvula visible    Neck mobility: normal    Immediate pre-procedure details:     Verified: bag valve mask available, emergency equipment available, intubation equipment available and oxygen available    Procedure details (see MAR for exact dosages):     Sedation:  Propofol (80 mg total)    Intra-procedure monitoring:  Blood pressure monitoring, cardiac monitor and continuous pulse oximetry    Intra-procedure events: none    Post-procedure details:     Foreign bodies recovered: Cerebrospinal fluid.    Patient is stable for discharge or admission: yes      Procedure completion:  Tolerated well, no  immediate complications              ED Course  ED Course as of 06/17/23 0626   Fri Jun 16, 2023   0018 EKG notes sinus tachycardia.  101 bpm.  No acute ST elevation.    Electronically signed by Wally Taylor DO, 06/16/23, 12:19 AM EDT.   [SF]   0149 CT Head Without Contrast [SF]   0215 Care transition to Dr. Bills    Electronically signed by Wally Taylor DO, 06/16/23, 2:15 AM EDT.   [SF]   0220 I assumed patient's care from Dr. Taylor at the end of his shift.  Please see his documentation above for further details. [CM]   0504 Patient has been resting comfortably.  He is awake, alert, well-oriented, in no apparent acute distress.  His neck is soft and supple with full range of motion.  There is no rigidity.  There is no Kernig's or Brudzinski sign.  We discussed a lumbar puncture, he does wish to proceed with this.  He would like sedation for the procedure.  Nursing is setting up for this now. [CM]   0657 Case discussed with and care endorsed to Dr. Salazar at shift change.  Pending CSF results and respiratory PCR [CM]   0921 Pt complaining of a headache currently afebrile provided Motrin 800 mg orally x1  Electronically signed by Saray Salazar DO, 06/16/23, 9:22 AM EDT.   [LK]      ED Course User Index  [CM] Gomez Bills MD  [LK] Saray Salazar DO  [SF] Wally Taylor DO                                           Medical Decision Making  Amount and/or Complexity of Data Reviewed  Labs: ordered.  Radiology: ordered. Decision-making details documented in ED Course.  ECG/medicine tests: ordered.    Risk  OTC drugs.  Prescription drug management.        Final diagnoses:   Viral meningitis       ED Disposition  ED Disposition       ED Disposition   Decision to Admit    Condition   --    Comment   Level of Care: Remote Telemetry [26]   Diagnosis: Viral meningitis [200877]   Certification: I Certify That Inpatient Hospital Services Are Medically Necessary For Greater Than 2 Midnights                 Please note  that portions of this note were completed with a voice recognition program.                Gomez Bills MD  06/17/23 0627      Electronically signed by Gomez Bills MD at 06/17/23 0627       Wally Taylor DO at 06/15/23 2230                   MEDICAL SCREENING:    Reason for Visit: Headache and neck pain subjective fever    Patient initially seen in triage.  The patient was advised further evaluation and diagnostic testing will be needed, some of the treatment and testing will be initiated in the lobby in order to begin the process.  The patient will be returned to the waiting area for the time being and possibly be re-assessed by a subsequent ED provider.  The patient will be brought back to the treatment area in as timely manner as possible.       Wally Taylor DO  06/15/23 2230      Electronically signed by Wally Taylor DO at 06/15/23 2230       No current facility-administered medications for this encounter.     Current Outpatient Medications   Medication Sig Dispense Refill    acetaminophen (TYLENOL) 325 MG tablet Take 2 tablets by mouth Every 4 (Four) Hours As Needed for Mild Pain for up to 7 days. 60 tablet 0    azithromycin (ZITHROMAX) 250 MG tablet Take 1 tablet by mouth Daily for 3 days 3 tablet 0    cyclobenzaprine (FLEXERIL) 10 MG tablet Take 1 tablet by mouth 3 (Three) Times a Day. 21 tablet 0    doxycycline (MONODOX) 100 MG capsule Take 1 capsule by mouth Every 12 (Twelve) Hours for 10 doses 10 capsule 0    ketorolac (TORADOL) 10 MG tablet Take 1 tablet by mouth Every 6 (Six) Hours As Needed for Moderate Pain. 21 tablet 0     Orders (last 24 hrs)        Start     Ordered    06/18/23 0000  acetaminophen (TYLENOL) 325 MG tablet  Every 4 Hours PRN         06/18/23 0916    06/18/23 0000  azithromycin (ZITHROMAX) 250 MG tablet  Daily         06/18/23 0916    06/18/23 0000  cyclobenzaprine (FLEXERIL) 10 MG tablet  3 Times Daily         06/18/23 0916    06/18/23 0000  doxycycline  (MONODOX) 100 MG capsule  Every 12 Hours Scheduled         06/18/23 0916    06/18/23 0000  ketorolac (TORADOL) 10 MG tablet  Every 6 Hours PRN         06/18/23 0916    06/18/23 0000  Discharge Follow-up with PCP         06/18/23 0916    06/18/23 0000  Diet: Regular/House Diet; Regular Texture (IDDSI 7); Thin (IDDSI 0)         06/18/23 0916    06/18/23 0000  Activity as Tolerated         06/18/23 0916    06/18/23 0000  Discharge Follow-up with Specified Provider: Dr. Johns (ID); 1 Week         06/18/23 0916    --  SCANNED - TELEMETRY           06/15/23 0000    --  SCANNED - TELEMETRY           06/15/23 0000    --  SCANNED - TELEMETRY           06/15/23 0000    --  SCANNED - TELEMETRY           06/15/23 0000    --  SCANNED - TELEMETRY           06/15/23 0000    --  SCANNED - TELEMETRY           06/15/23 0000                  Physician Progress Notes (last 24 hours)  Notes from 06/19/23 0831 through 06/20/23 0831   No notes of this type exist for this encounter.       Consult Notes (last 24 hours)  Notes from 06/19/23 0831 through 06/20/23 0831   No notes of this type exist for this encounter.

## 2023-06-21 LAB
A PHAGOCYTOPH DNA BLD QL NAA+PROBE: NEGATIVE
B HENSELAE IGG TITR SER IF: NEGATIVE TITER
B HENSELAE IGM TITR SER IF: NEGATIVE TITER
B QUINTANA IGG TITR SER IF: NEGATIVE TITER
B QUINTANA IGM TITR SER IF: NEGATIVE TITER
BACTERIA FLD CULT: NORMAL
E CHAFFEENSIS DNA BLD QL NAA+PROBE: NEGATIVE
GRAM STN SPEC: NORMAL

## 2023-06-22 PROBLEM — A87.9 VIRAL MENINGITIS: Status: RESOLVED | Noted: 2023-06-16 | Resolved: 2023-06-22

## 2023-06-22 PROBLEM — Z87.438 HISTORY OF PROSTATITIS: Chronic | Status: ACTIVE | Noted: 2023-04-04

## 2023-06-22 LAB
BABESIA IGG TITR SER IF: NORMAL {TITER}
BABESIA IGM TITR SER IF: NORMAL {TITER}
BACTERIA SPEC AEROBE CULT: NORMAL
BACTERIA SPEC AEROBE CULT: NORMAL

## 2023-06-23 LAB — RICKETTSIA RICKETTSII DNA, RT: NOT DETECTED

## 2023-07-26 ENCOUNTER — OFFICE VISIT (OUTPATIENT)
Dept: ORTHOPEDIC SURGERY | Facility: CLINIC | Age: 33
End: 2023-07-26
Payer: COMMERCIAL

## 2023-07-26 VITALS — BODY MASS INDEX: 36.88 KG/M2 | HEIGHT: 69 IN | WEIGHT: 249 LBS

## 2023-07-26 DIAGNOSIS — M54.2 CERVICALGIA: Primary | ICD-10-CM

## 2023-07-26 PROCEDURE — 99213 OFFICE O/P EST LOW 20 MIN: CPT | Performed by: PHYSICIAN ASSISTANT

## 2023-07-26 RX ORDER — NAPROXEN 500 MG/1
500 TABLET ORAL 2 TIMES DAILY WITH MEALS
Qty: 60 TABLET | Refills: 2 | Status: SHIPPED | OUTPATIENT
Start: 2023-07-26

## 2023-08-10 NOTE — PROGRESS NOTES
[facility] Established Patient Visit          Patient: Rupert Bean  YOB: 1990  Date of Encounter: 7/26/2023  PCP: Carol Boswell DO      Subjective     Chief Complaint   Patient presents with    Cervical Spine - Follow-up, Pain           History of Present Illness:     Rupert Bean is a 33 y.o. male presents evaluation cervicalgia.  Patient has undergone formal outpatient physical therapy with modalities to help to alleviate the trapezius spasming and loss of cervical lordosis.  The patient has attempted using a different pillow which seems to be of benefit.  He reports no significant worsening and mild improvement of pain symptoms.  Patient describes no significant worsening since last office visit.  Patient denies any paresthesias currently.  .  Denies any paresthesias.         Patient Active Problem List   Diagnosis    DALLIN on CPAP    Health care maintenance    Scrotal pain    Dysuria    Lesion of skin of nose    History of prostatitis     Past Medical History:   Diagnosis Date    Broken leg     Bronchitis     History of ear infections     Viral meningitis      Past Surgical History:   Procedure Laterality Date    FRACTURE SURGERY Right 1994     Social History     Occupational History    Not on file   Tobacco Use    Smoking status: Never    Smokeless tobacco: Never   Vaping Use    Vaping Use: Never used   Substance and Sexual Activity    Alcohol use: Yes     Alcohol/week: 1.0 standard drink     Types: 1 Cans of beer per week     Comment: not weekly, once a month    Drug use: No    Sexual activity: Not Currently    Rupert Bean  reports that he has never smoked. He has never used smokeless tobacco.. I have educated him on the risk of diseases from using tobacco products such as cancer, COPD and heart disease.        Social History     Social History Narrative    Not on file     Family History   Problem Relation Age of Onset    Cancer Mother     Hypertension Mother   "   Cancer Father     Anuerysm Father     Diabetes Brother     Diabetes Maternal Grandfather     Heart disease Maternal Grandfather      Current Outpatient Medications   Medication Sig Dispense Refill    cyclobenzaprine (FLEXERIL) 10 MG tablet Take 1 tablet by mouth 3 (Three) Times a Day. 21 tablet 0    ketorolac (TORADOL) 10 MG tablet Take 1 tablet by mouth Every 6 (Six) Hours As Needed for Moderate Pain. 21 tablet 0    naproxen (NAPROSYN) 500 MG tablet Take 1 tablet by mouth 2 (Two) Times a Day With Meals. 60 tablet 2     No current facility-administered medications for this visit.     No Known Allergies         Review of Systems   Constitutional: Negative.   HENT: Negative.     Eyes: Negative.    Cardiovascular:  Positive for leg swelling.   Respiratory: Negative.     Endocrine: Negative.    Hematologic/Lymphatic: Negative.    Skin: Negative.    Musculoskeletal:         Pertinent positives listed in HPI   Gastrointestinal: Negative.    Genitourinary: Negative.    Neurological: Negative.    Psychiatric/Behavioral: Negative.     Allergic/Immunologic: Negative.        Objective      Vitals:    07/26/23 0944   Weight: 113 kg (249 lb)   Height: 175.3 cm (69\")      Class 2 Severe Obesity (BMI >=35 and <=39.9). Obesity-related health conditions include the following:  listed in PMH . Obesity is newly identified. BMI is is above average; BMI management plan is completed. We discussed portion control and increasing exercise.      Physical Exam  Vitals and nursing note reviewed.   Constitutional:       General: He is not in acute distress.     Appearance: Normal appearance. He is not ill-appearing.   HENT:      Head: Normocephalic and atraumatic.      Right Ear: External ear normal.      Left Ear: External ear normal.      Nose: Nose normal.      Mouth/Throat:      Mouth: Mucous membranes are moist.      Pharynx: Oropharynx is clear.   Eyes:      Extraocular Movements: Extraocular movements intact.      Conjunctiva/sclera: " Conjunctivae normal.      Pupils: Pupils are equal, round, and reactive to light.   Cardiovascular:      Rate and Rhythm: Normal rate.      Pulses: Normal pulses.   Pulmonary:      Effort: Pulmonary effort is normal.   Abdominal:      General: There is no distension.   Musculoskeletal:      Cervical back: Normal range of motion. No rigidity.      Comments: Cervical spine examination today reveals mild loss of cervical lordosis.  Patient exhibits mild painful forward flexion and lateral rotation with facet loading positive right-sided.  Patient exhibits paraspinal muscular spasm and tenderness.  Neurovascular status grossly intact bilateral upper extremities.   Skin:     General: Skin is warm and dry.      Capillary Refill: Capillary refill takes less than 2 seconds.   Neurological:      General: No focal deficit present.      Mental Status: He is alert and oriented to person, place, and time.   Psychiatric:         Mood and Affect: Mood normal.         Behavior: Behavior normal.               Radiology:      CT Head Without Contrast    Result Date: 6/16/2023  1. No acute intracranial process.  This report was finalized on 6/16/2023 12:59 AM by Asa Mcnamara MD.      CT Soft Tissue Neck With Contrast    Result Date: 6/17/2023  Impression: 1.  There might be minimal left tonsillitis with no evidence of parapharyngeal or peritonsillar abscess. 2.  No lymphadenopathy is identified. 3.  Straightening of the normal cervical lordosis is likely due to muscle spasm.  This report was finalized on 6/17/2023 3:11 PM by Raymond Patel MD.      XR Chest 1 View    Result Date: 6/15/2023   1.  No acute process seen in the chest. 2.  No lobar consolidation or edema. 3.  No features of bronchial inflammation or hilar adenopathy. 4.  No free air in the upper abdomen. 5.  No fracture or foreign body.  This report was finalized on 6/15/2023 11:20 PM by Andrew Crespo MD.               Assessment/Plan      No diagnosis  found.    33-year-old male with notable cervicalgia and loss of cervical lordosis.  The patient has undergone formal outpatient physical therapy with some improvement of his previous spasming pain.  Patient has yet to undergo any anti-inflammatory medication as result the patient was provided with a prescription for Naprosyn 500 mg 1 p.o. twice daily with meals dispense #60 with 2 refills.  The patient will continue to finish out the remainder of physical therapy as well as implementation of the medication.  No direct surgical indication.  The patient will return back in 4 weeks for further evaluation of the efficacy of the conservative treatment.                    This document was signed by Wilmer Starkey PA-C July 26, 2023    CC: Carol Boswell DO      Dictated Utilizing Dragon Dictation:   Please note that portions of this note were completed with a voice recognition program.   Part of this note may be an electronic transcription/translation of spoken language to printed text using the Dragon Dictation System.

## 2023-08-31 ENCOUNTER — OFFICE VISIT (OUTPATIENT)
Dept: ORTHOPEDIC SURGERY | Facility: CLINIC | Age: 33
End: 2023-08-31
Payer: COMMERCIAL

## 2023-08-31 VITALS — BODY MASS INDEX: 36.88 KG/M2 | HEIGHT: 69 IN | WEIGHT: 249 LBS

## 2023-08-31 DIAGNOSIS — M54.2 CERVICALGIA: Primary | ICD-10-CM

## 2023-08-31 PROCEDURE — 99213 OFFICE O/P EST LOW 20 MIN: CPT | Performed by: PHYSICIAN ASSISTANT

## 2023-08-31 NOTE — PROGRESS NOTES
[facility] [place] [facility] Established Patient Visit          Patient: Rupert Bean  YOB: 1990  Date of Encounter: 8/31/2023  PCP: Carol Boswell DO      Subjective     Chief Complaint   Patient presents with    Cervical Spine - Follow-up           History of Present Illness:     Rupert Bean is a 33 y.o. male presents evaluation cervicalgia.  Patient has undergone formal outpatient physical therapy with modalities to help to alleviate the trapezius spasming and loss of cervical lordosis.  The patient has attempted using a different pillow which seems to be of benefit.  He reports no significant worsening and continued improvement of pain symptoms.  Patient describes no significant worsening since last office visit.  Patient denies any paresthesias currently.  .  Denies any paresthesias.         Patient Active Problem List   Diagnosis    DALLIN on CPAP    Health care maintenance    Scrotal pain    Dysuria    Lesion of skin of nose    History of prostatitis     Past Medical History:   Diagnosis Date    Broken leg     Bronchitis     History of ear infections     Viral meningitis      Past Surgical History:   Procedure Laterality Date    FRACTURE SURGERY Right 1994     Social History     Occupational History    Not on file   Tobacco Use    Smoking status: Never    Smokeless tobacco: Never   Vaping Use    Vaping Use: Never used   Substance and Sexual Activity    Alcohol use: Yes     Alcohol/week: 1.0 standard drink     Types: 1 Cans of beer per week     Comment: not weekly, once a month    Drug use: No    Sexual activity: Not Currently    Rupert Bean  reports that he has never smoked. He has never used smokeless tobacco.. I have educated him on the risk of diseases from using tobacco products such as cancer, COPD and heart disease.        Social History     Social History Narrative    Not on file     Family History   Problem Relation Age of Onset    Cancer Mother     Hypertension Mother      "Cancer Father     Anuerysm Father     Diabetes Brother     Diabetes Maternal Grandfather     Heart disease Maternal Grandfather      Current Outpatient Medications   Medication Sig Dispense Refill    naproxen (NAPROSYN) 500 MG tablet Take 1 tablet by mouth 2 (Two) Times a Day With Meals. 60 tablet 2    cyclobenzaprine (FLEXERIL) 10 MG tablet Take 1 tablet by mouth 3 (Three) Times a Day. (Patient not taking: Reported on 8/31/2023) 21 tablet 0    ketorolac (TORADOL) 10 MG tablet Take 1 tablet by mouth Every 6 (Six) Hours As Needed for Moderate Pain. (Patient not taking: Reported on 8/31/2023) 21 tablet 0     No current facility-administered medications for this visit.     No Known Allergies         Review of Systems   Constitutional: Negative.   HENT: Negative.     Eyes: Negative.    Cardiovascular:  Positive for leg swelling.   Respiratory: Negative.     Endocrine: Negative.    Hematologic/Lymphatic: Negative.    Skin: Negative.    Musculoskeletal:         Pertinent positives listed in HPI   Gastrointestinal: Negative.    Genitourinary: Negative.    Neurological: Negative.    Psychiatric/Behavioral: Negative.     Allergic/Immunologic: Negative.        Objective      Vitals:    08/31/23 0921   Weight: 113 kg (249 lb)   Height: 175.3 cm (69\")      Class 2 Severe Obesity (BMI >=35 and <=39.9). Obesity-related health conditions include the following:  listed in PMH . Obesity is newly identified. BMI is is above average; BMI management plan is completed. We discussed portion control and increasing exercise.      Physical Exam  Vitals and nursing note reviewed.   Constitutional:       General: He is not in acute distress.     Appearance: Normal appearance. He is not ill-appearing.   HENT:      Head: Normocephalic and atraumatic.      Right Ear: External ear normal.      Left Ear: External ear normal.      Nose: Nose normal.      Mouth/Throat:      Mouth: Mucous membranes are moist.      Pharynx: Oropharynx is clear. "   Eyes:      Extraocular Movements: Extraocular movements intact.      Conjunctiva/sclera: Conjunctivae normal.      Pupils: Pupils are equal, round, and reactive to light.   Cardiovascular:      Rate and Rhythm: Normal rate.      Pulses: Normal pulses.   Pulmonary:      Effort: Pulmonary effort is normal.   Abdominal:      General: There is no distension.   Musculoskeletal:      Cervical back: Normal range of motion. No rigidity.      Comments: Cervical spine examination today reveals mild loss of cervical lordosis.  Patient exhibits mild painful forward flexion and lateral rotation with facet loading positive right-sided.  Patient exhibits paraspinal muscular spasm and tenderness.  Neurovascular status grossly intact bilateral upper extremities.   Skin:     General: Skin is warm and dry.      Capillary Refill: Capillary refill takes less than 2 seconds.   Neurological:      General: No focal deficit present.      Mental Status: He is alert and oriented to person, place, and time.   Psychiatric:         Mood and Affect: Mood normal.         Behavior: Behavior normal.               Radiology:      CT Head Without Contrast    Result Date: 6/16/2023  1. No acute intracranial process.  This report was finalized on 6/16/2023 12:59 AM by Asa Mcnamara MD.      CT Soft Tissue Neck With Contrast    Result Date: 6/17/2023  Impression: 1.  There might be minimal left tonsillitis with no evidence of parapharyngeal or peritonsillar abscess. 2.  No lymphadenopathy is identified. 3.  Straightening of the normal cervical lordosis is likely due to muscle spasm.  This report was finalized on 6/17/2023 3:11 PM by Raymond Patel MD.               Assessment/Plan        ICD-10-CM ICD-9-CM   1. Cervicalgia  M54.2 723.1     33-year-old male with notable cervicalgia and loss of cervical lordosis.  The patient has undergone formal outpatient physical therapy with improvement of his overall alignment as well as a previous spasming pain  and stiffness.  Patient has continue with the Naprosyn with alleviation and has finished at the entirety of the physical therapy.  Patient will continue with daily strengthening and stabilization exercises and stretching.  The patient will follow-up on an as-needed basis upon any further complication or worsening of pain/symptoms.  No direct surgical indication.                  This document was signed by Wilmer Starkey PA-C August 31 , 2023    CC: Carol Boswell DO      Dictated Utilizing Dragon Dictation:   Please note that portions of this note were completed with a voice recognition program.   Part of this note may be an electronic transcription/translation of spoken language to printed text using the Dragon Dictation System.

## 2023-09-25 ENCOUNTER — TELEPHONE (OUTPATIENT)
Dept: ORTHOPEDIC SURGERY | Facility: CLINIC | Age: 33
End: 2023-09-25
Payer: COMMERCIAL

## 2023-09-25 ENCOUNTER — TELEPHONE (OUTPATIENT)
Dept: ORTHOPEDIC SURGERY | Facility: CLINIC | Age: 33
End: 2023-09-25

## 2023-09-25 NOTE — TELEPHONE ENCOUNTER
Caller: Rupert Bean    Relationship: Self    Best call back number: [phone] (home)       What orders are you requesting (i.e. lab or imaging): MRI RT ANKLE    In what timeframe would the patient need to come in: ASAP    Where will you receive your lab/imaging services: [facility]     Additional notes: LAST SEEN 8.31.23

## 2023-09-25 NOTE — TELEPHONE ENCOUNTER
Caller: Rupert Bean    Relationship: Self    Best call back number: [phone] (home)       What is the best time to reach you: EARLY AM     Who are you requesting to speak with (clinical staff, provider,  specific staff member): KODAK    Do you know the name of the person who called: KODAK    What was the call regarding: MRI SCHEDULING     Is it okay if the provider responds through MyChart: YES

## 2023-09-26 NOTE — TELEPHONE ENCOUNTER
UNABLE TO WARM TRANSFER   Caller: Rupert Bean    Relationship: Self     Best call back number: [phone] (home)      What is the best time to reach you: EARLY AM      Who are you requesting to speak with (clinical staff, provider,  specific staff member): KODAK     Do you know the name of the person who called: KODAK     What was the call regarding: MRI SCHEDULING      Is it okay if the provider responds through MyChart: YES

## 2023-10-02 ENCOUNTER — OFFICE VISIT (OUTPATIENT)
Dept: ORTHOPEDIC SURGERY | Facility: CLINIC | Age: 33
End: 2023-10-02
Payer: COMMERCIAL

## 2023-10-02 VITALS — WEIGHT: 249 LBS | BODY MASS INDEX: 36.88 KG/M2 | HEIGHT: 69 IN

## 2023-10-02 DIAGNOSIS — M76.61 ACHILLES TENDINITIS OF RIGHT LOWER EXTREMITY: ICD-10-CM

## 2023-10-02 DIAGNOSIS — M25.571 RIGHT ANKLE PAIN, UNSPECIFIED CHRONICITY: ICD-10-CM

## 2023-10-02 DIAGNOSIS — S86.111A STRAIN OF RIGHT GASTROCNEMIUS MUSCLE, INITIAL ENCOUNTER: Primary | ICD-10-CM

## 2023-10-15 NOTE — PROGRESS NOTES
[facility] [place] Orthopaedic Surgery Established Patient Visit          Patient: Rupert Bean  YOB: 1990  Date of Encounter: 10/2/2023  PCP: Carol Boswell DO      Subjective     Chief Complaint   Patient presents with    Right Ankle - Follow-up, Pain           History of Present Illness:     Rupert Bean is a 33 y.o. male presents evaluation right posterior right ankle and calf pain following stretching sensation whenever he was attempting to stretch and felt a pull and snap into the posterior aspect of the right ankle and calf.  He had immediate pain and swelling and bruising and difficulty with range of motion most notably dorsiflexion.  Patient has attempted to discontinue activity until being seen today.  He reports that the majority of swelling and bruising has dissipated however he still has stiffness upon certain range of motion most notably dorsiflexion.  Denies any other new complaints     Patient Active Problem List   Diagnosis    DALLIN on CPAP    Health care maintenance    Scrotal pain    Dysuria    Lesion of skin of nose    History of prostatitis     Past Medical History:   Diagnosis Date    Broken leg     Bronchitis     History of ear infections     Viral meningitis      Past Surgical History:   Procedure Laterality Date    FRACTURE SURGERY Right 1994     Social History     Occupational History    Not on file   Tobacco Use    Smoking status: Never    Smokeless tobacco: Never   Vaping Use    Vaping Use: Never used   Substance and Sexual Activity    Alcohol use: Yes     Alcohol/week: 1.0 standard drink of alcohol     Types: 1 Cans of beer per week     Comment: not weekly, once a month    Drug use: No    Sexual activity: Not Currently    Rupert Bean  reports that he has never smoked. He has never used smokeless tobacco.. I have educated him on the risk of diseases from using tobacco products such as cancer, COPD and heart disease.        Social History     Social History Narrative     "Not on file     Family History   Problem Relation Age of Onset    Cancer Mother     Hypertension Mother     Cancer Father     Anuerysm Father     Diabetes Brother     Diabetes Maternal Grandfather     Heart disease Maternal Grandfather      Current Outpatient Medications   Medication Sig Dispense Refill    cyclobenzaprine (FLEXERIL) 10 MG tablet Take 1 tablet by mouth 3 (Three) Times a Day. 21 tablet 0    ketorolac (TORADOL) 10 MG tablet Take 1 tablet by mouth Every 6 (Six) Hours As Needed for Moderate Pain. 21 tablet 0    naproxen (NAPROSYN) 500 MG tablet Take 1 tablet by mouth 2 (Two) Times a Day With Meals. 60 tablet 2     No current facility-administered medications for this visit.     No Known Allergies         Review of Systems   Constitutional: Negative.   HENT: Negative.     Eyes: Negative.    Cardiovascular:  Positive for leg swelling.   Respiratory: Negative.     Endocrine: Negative.    Hematologic/Lymphatic: Negative.    Skin: Negative.    Musculoskeletal:         Pertinent positives listed in HPI   Gastrointestinal: Negative.    Genitourinary: Negative.    Neurological: Negative.    Psychiatric/Behavioral: Negative.     Allergic/Immunologic: Negative.          Objective      Vitals:    10/02/23 1106   Weight: 113 kg (249 lb)   Height: 175.3 cm (69\")      Class 2 Severe Obesity (BMI >=35 and <=39.9). Obesity-related health conditions include the following:  listed in PMH . Obesity is newly identified. BMI is is above average; BMI management plan is completed. We discussed portion control and increasing exercise.      Physical Exam  Vitals and nursing note reviewed.   Constitutional:       General: He is not in acute distress.     Appearance: Normal appearance. He is not ill-appearing.   HENT:      Head: Normocephalic and atraumatic.      Right Ear: External ear normal.      Left Ear: External ear normal.      Nose: Nose normal.      Mouth/Throat:      Mouth: Mucous membranes are moist.      Pharynx: " Oropharynx is clear.   Eyes:      Extraocular Movements: Extraocular movements intact.      Conjunctiva/sclera: Conjunctivae normal.      Pupils: Pupils are equal, round, and reactive to light.   Cardiovascular:      Rate and Rhythm: Normal rate.      Pulses: Normal pulses.   Pulmonary:      Effort: Pulmonary effort is normal.   Abdominal:      General: There is no distension.   Musculoskeletal:      Cervical back: Normal range of motion. No rigidity.      Comments: Examination today of patient's right foot and ankle and lower leg reveals tenderness to palpation along the musculotendinous junction at the gastrocnemius and Achilles tendon.  Patient has pain with palpation however full intact range of motion with mild pain upon full dorsiflexion and plantarflexion.  Canales test and Homans test negative.  There is no significant swelling, ecchymosis or erythema.  Neurovascular status grossly intact right lower extremity.   Skin:     General: Skin is warm and dry.      Capillary Refill: Capillary refill takes less than 2 seconds.   Neurological:      General: No focal deficit present.      Mental Status: He is alert and oriented to person, place, and time.   Psychiatric:         Mood and Affect: Mood normal.         Behavior: Behavior normal.                 Radiology:      No radiology results for the last 90 days.              Assessment/Plan        ICD-10-CM ICD-9-CM   1. Strain of right gastrocnemius muscle, initial encounter  S86.111A 844.8   2. Achilles tendinitis of right lower extremity  M76.61 726.71   3. Right ankle pain, unspecified chronicity  M25.571 719.47         33-year-old male with notable several week history of a right musculotendinous junction gastrocnemius strain.  Further discussion was had with the patient and we discussed implementation of equalizer boot and conservative treatment options in an effort to forego and reduce the stress of the insertional strain.  He will forego his training and  walking over the next 2 weeks.  He may implement the NSAID usage.  Patient was instructed to return at that time for further evaluation.              This document was signed by Wilmer Starkey PA-C October 2, 2023    CC: Carol Boswell DO      Dictated Utilizing Dragon Dictation:   Please note that portions of this note were completed with a voice recognition program.   Part of this note may be an electronic transcription/translation of spoken language to printed text using the Dragon Dictation System.

## 2023-10-18 ENCOUNTER — OFFICE VISIT (OUTPATIENT)
Dept: ORTHOPEDIC SURGERY | Facility: CLINIC | Age: 33
End: 2023-10-18
Payer: COMMERCIAL

## 2023-10-18 VITALS — WEIGHT: 249 LBS | HEIGHT: 69 IN | BODY MASS INDEX: 36.88 KG/M2

## 2023-10-18 DIAGNOSIS — S86.111D STRAIN OF RIGHT GASTROCNEMIUS MUSCLE, SUBSEQUENT ENCOUNTER: Primary | ICD-10-CM

## 2023-10-18 DIAGNOSIS — M76.61 ACHILLES TENDINITIS OF RIGHT LOWER EXTREMITY: ICD-10-CM

## 2023-10-18 NOTE — PROGRESS NOTES
[facility] [place] Orthopaedic Surgery Established Patient Visit          Patient: Rupert Bean  YOB: 1990  Date of Encounter: 10/18/2023  PCP: Carol Boswell DO      Subjective     Chief Complaint   Patient presents with    Right Ankle - Follow-up, Pain           History of Present Illness:     Rupert Bean is a 33 y.o. male presents for follow-up evaluation right posterior right ankle and calf pain following stretching sensation whenever he was attempting to stretch and felt a pull and snap into the posterior aspect of the right ankle and calf.  He has progressed with activity with decreasing pain and improving overall mobility.  The patient states that he was able to walk 4 miles just the other day without significant complication.  He has been implementing the Naprosyn to good benefit.  He has been implementing formal outpatient physical therapy as well.  He reports no other new complaints and reports continued dissipation.         Patient Active Problem List   Diagnosis    DALLIN on CPAP    Health care maintenance    Scrotal pain    Dysuria    Lesion of skin of nose    History of prostatitis     Past Medical History:   Diagnosis Date    Broken leg     Bronchitis     History of ear infections     Viral meningitis      Past Surgical History:   Procedure Laterality Date    FRACTURE SURGERY Right 1994     Social History     Occupational History    Not on file   Tobacco Use    Smoking status: Never    Smokeless tobacco: Never   Vaping Use    Vaping Use: Never used   Substance and Sexual Activity    Alcohol use: Yes     Alcohol/week: 1.0 standard drink of alcohol     Types: 1 Cans of beer per week     Comment: not weekly, once a month    Drug use: No    Sexual activity: Not Currently    Rupert Bean  reports that he has never smoked. He has never used smokeless tobacco.. I have educated him on the risk of diseases from using tobacco products such as cancer, COPD and heart disease.        Social  "History     Social History Narrative    Not on file     Family History   Problem Relation Age of Onset    Cancer Mother     Hypertension Mother     Cancer Father     Anuerysm Father     Diabetes Brother     Diabetes Maternal Grandfather     Heart disease Maternal Grandfather      Current Outpatient Medications   Medication Sig Dispense Refill    cyclobenzaprine (FLEXERIL) 10 MG tablet Take 1 tablet by mouth 3 (Three) Times a Day. 21 tablet 0    ketorolac (TORADOL) 10 MG tablet Take 1 tablet by mouth Every 6 (Six) Hours As Needed for Moderate Pain. 21 tablet 0    naproxen (NAPROSYN) 500 MG tablet Take 1 tablet by mouth 2 (Two) Times a Day With Meals. 60 tablet 2     No current facility-administered medications for this visit.     No Known Allergies         Review of Systems   Constitutional: Negative.   HENT: Negative.     Eyes: Negative.    Cardiovascular:  Positive for leg swelling.   Respiratory: Negative.     Endocrine: Negative.    Hematologic/Lymphatic: Negative.    Skin: Negative.    Musculoskeletal:         Pertinent positives listed in HPI   Gastrointestinal: Negative.    Genitourinary: Negative.    Neurological: Negative.    Psychiatric/Behavioral: Negative.     Allergic/Immunologic: Negative.          Objective      Vitals:    10/18/23 0856   Weight: 113 kg (249 lb)   Height: 175.3 cm (69\")      Class 2 Severe Obesity (BMI >=35 and <=39.9). Obesity-related health conditions include the following:  listed in PMH . Obesity is newly identified. BMI is is above average; BMI management plan is completed. We discussed portion control and increasing exercise.      Physical Exam  Vitals and nursing note reviewed.   Constitutional:       General: He is not in acute distress.     Appearance: Normal appearance. He is not ill-appearing.   HENT:      Head: Normocephalic and atraumatic.      Right Ear: External ear normal.      Left Ear: External ear normal.      Nose: Nose normal.      Mouth/Throat:      Mouth: Mucous " membranes are moist.      Pharynx: Oropharynx is clear.   Eyes:      Extraocular Movements: Extraocular movements intact.      Conjunctiva/sclera: Conjunctivae normal.      Pupils: Pupils are equal, round, and reactive to light.   Cardiovascular:      Rate and Rhythm: Normal rate.      Pulses: Normal pulses.   Pulmonary:      Effort: Pulmonary effort is normal.   Abdominal:      General: There is no distension.   Musculoskeletal:      Cervical back: Normal range of motion. No rigidity.      Comments: Examination today of patient's right foot and ankle and lower leg reveals reduction of previous tenderness to palpation along the musculotendinous junction at the gastrocnemius and Achilles tendon.  Patient has no pain with palpation however full intact range of motion with mild pain upon full dorsiflexion and plantarflexion.  Canales test and Homans test negative.  There is no significant swelling, ecchymosis or erythema. Neurovascular status grossly intact right lower extremity.   Skin:     General: Skin is warm and dry.      Capillary Refill: Capillary refill takes less than 2 seconds.   Neurological:      General: No focal deficit present.      Mental Status: He is alert and oriented to person, place, and time.   Psychiatric:         Mood and Affect: Mood normal.         Behavior: Behavior normal.             Radiology:      No radiology results for the last 90 days.              Assessment/Plan        ICD-10-CM ICD-9-CM   1. Strain of right gastrocnemius muscle, subsequent encounter  S86.111D V58.89     844.8   2. Achilles tendinitis of right lower extremity  M76.61 726.71         33-year-old male with several week history of previous right musculotendinous junction gastrocnemius strain.  The patient has responded well and has had progression and resolution of the insertional Achilles tendinitis as well.  Patient will finish out the remainder the physical therapy and slowly wean from the anti-inflammatory  medications pain and swelling are his guide.  The patient will return back on an as-needed basis upon any complication or worsening of pain/symptoms.              This document was signed by Wilmer Starkey PA-C October 18, 2023    CC: Carol Boswell DO      Dictated Utilizing Dragon Dictation:   Please note that portions of this note were completed with a voice recognition program.   Part of this note may be an electronic transcription/translation of spoken language to printed text using the Dragon Dictation System.

## 2023-12-28 ENCOUNTER — OFFICE VISIT (OUTPATIENT)
Dept: FAMILY MEDICINE CLINIC | Facility: CLINIC | Age: 33
End: 2023-12-28
Payer: COMMERCIAL

## 2023-12-28 VITALS
HEART RATE: 101 BPM | RESPIRATION RATE: 18 BRPM | BODY MASS INDEX: 35.4 KG/M2 | DIASTOLIC BLOOD PRESSURE: 70 MMHG | WEIGHT: 239 LBS | HEIGHT: 69 IN | OXYGEN SATURATION: 95 % | SYSTOLIC BLOOD PRESSURE: 118 MMHG | TEMPERATURE: 98 F

## 2023-12-28 DIAGNOSIS — M79.632 LEFT FOREARM PAIN: Primary | ICD-10-CM

## 2023-12-28 PROCEDURE — 99213 OFFICE O/P EST LOW 20 MIN: CPT | Performed by: NURSE PRACTITIONER

## 2023-12-28 NOTE — PROGRESS NOTES
"Subjective   Rupert Bean is a 33 y.o. male.     Chief Complaint   Patient presents with    Arm Pain       History of Present Illness  He presents with c/o left arm pain for the past couple weeks. He was doing [employer] and got caught up and heard it pop. Pain is worse with heavy lifting or playing guitar. He has tried heat and cold therapy. He states it was swollen the first day. Pain is described as achy. Pain is rated 5 on a scale of 0-10.        The following portions of the patient's history were reviewed and updated as appropriate: allergies, current medications, past family history, past medical history, past social history, past surgical history and problem list.    Review of Systems   Constitutional:  Negative for fever.   Respiratory:  Negative for cough, shortness of breath and wheezing.    Cardiovascular:  Negative for chest pain and palpitations.   Gastrointestinal:  Negative for diarrhea, nausea and vomiting.   Genitourinary:  Negative for dysuria and hematuria.   Musculoskeletal:  Positive for arthralgias, joint swelling and myalgias.   Hematological:  Negative for adenopathy.       Objective     /70 (BP Location: Left arm, Patient Position: Sitting, Cuff Size: Adult)   Pulse 101   Temp 98 °F (36.7 °C) (Temporal)   Resp 18   Ht 175.3 cm (69.02\")   Wt 108 kg (239 lb)   SpO2 95%   BMI 35.28 kg/m²     Physical Exam  Vitals reviewed.   Constitutional:       General: He is not in acute distress.     Appearance: He is well-developed. He is not diaphoretic.   HENT:      Head: Normocephalic and atraumatic.   Cardiovascular:      Rate and Rhythm: Normal rate and regular rhythm.      Heart sounds: Normal heart sounds. No murmur heard.     No friction rub. No gallop.   Pulmonary:      Effort: Pulmonary effort is normal. No respiratory distress.      Breath sounds: Normal breath sounds.   Abdominal:      General: Bowel sounds are normal. There is no distension.      Palpations: Abdomen is soft.    "   Tenderness: There is no abdominal tenderness.   Musculoskeletal:      Left forearm: Tenderness present. No swelling.   Skin:     General: Skin is warm and dry.   Neurological:      Mental Status: He is alert and oriented to person, place, and time.   Psychiatric:         Behavior: Behavior normal.         Thought Content: Thought content normal.         Judgment: Judgment normal.         Current Outpatient Medications   Medication Sig Dispense Refill    cyclobenzaprine (FLEXERIL) 10 MG tablet Take 1 tablet by mouth 3 (Three) Times a Day. 21 tablet 0    naproxen (NAPROSYN) 500 MG tablet Take 1 tablet by mouth 2 (Two) Times a Day With Meals. 60 tablet 2     No current facility-administered medications for this visit.            Assessment & Plan     Problem List Items Addressed This Visit    None  Visit Diagnoses       Left forearm pain    -  Primary    Relevant Orders    XR forearm 2 vw left    Ambulatory Referral to Physical Therapy Evaluate and treat (Completed)              ICD-10-CM ICD-9-CM   1. Left forearm pain  M79.632 729.5       Plan: Get xray of left forearm and start physical therapy. Rest. Ice. Follow up in 6 weeks if no better.    @Body mass index is 35.28 kg/m².              Understands disease processes and need for medications.  Understands reasons for urgent and emergent care.  Patient (& family) verbalized agreement for treatment plan.   Emotional support and active listening provided.  Patient provided time to verbalize feelings.                  This document has been electronically signed by MARY Rivera   December 29, 2023 12:33 EST

## 2023-12-29 ENCOUNTER — HOSPITAL ENCOUNTER (OUTPATIENT)
Dept: GENERAL RADIOLOGY | Facility: HOSPITAL | Age: 33
Discharge: HOME | End: 2023-12-29
Admitting: NURSE PRACTITIONER
Payer: COMMERCIAL

## 2023-12-29 DIAGNOSIS — M79.632 LEFT FOREARM PAIN: ICD-10-CM

## 2023-12-29 PROCEDURE — 73090 X-RAY EXAM OF FOREARM: CPT

## 2024-01-12 ENCOUNTER — TELEPHONE (OUTPATIENT)
Dept: UROLOGY | Facility: CLINIC | Age: 34
End: 2024-01-12

## 2024-01-22 ENCOUNTER — OFFICE VISIT (OUTPATIENT)
Dept: UROLOGY | Facility: CLINIC | Age: 34
End: 2024-01-22
Payer: COMMERCIAL

## 2024-01-22 VITALS
SYSTOLIC BLOOD PRESSURE: 124 MMHG | HEART RATE: 64 BPM | HEIGHT: 69 IN | DIASTOLIC BLOOD PRESSURE: 77 MMHG | BODY MASS INDEX: 35.9 KG/M2 | WEIGHT: 242.4 LBS

## 2024-01-22 DIAGNOSIS — N41.0 ACUTE PROSTATITIS: Primary | ICD-10-CM

## 2024-01-22 DIAGNOSIS — Z31.41 FERTILITY TESTING: ICD-10-CM

## 2024-01-22 DIAGNOSIS — N52.8 OTHER MALE ERECTILE DYSFUNCTION: ICD-10-CM

## 2024-01-22 DIAGNOSIS — R30.0 DYSURIA: ICD-10-CM

## 2024-01-22 PROCEDURE — 99214 OFFICE O/P EST MOD 30 MIN: CPT

## 2024-01-22 RX ORDER — DOXYCYCLINE HYCLATE 100 MG/1
CAPSULE ORAL
Qty: 20 CAPSULE | Refills: 0 | Status: SHIPPED | OUTPATIENT
Start: 2024-01-22

## 2024-01-22 NOTE — PROGRESS NOTES
"Chief Complaint:    Chief Complaint   Patient presents with    Right Testicle pain       Vital Signs:   /77   Pulse 64   Ht 175.3 cm (69.02\")   Wt 110 kg (242 lb 6.4 oz)   BMI 35.78 kg/m²   Body mass index is 35.78 kg/m².      HPI:  Rupert Bean is a 33 y.o. male who presents today for follow up    History of Present Illness  Mr. Bean presents to the clinic for follow-up.  He was last seen in office by me in April 2023 for left testicular pain, dysuria, and acute prostatitis.  He was started on Bactrim twice daily for 2 weeks at last office visit and had significant improvement.  Patient states that over the past 1 to 2 months he has had an increase in perineum and penile shaft pain.  States that he is been exercising and has had significant weight loss with improvement in his testicular pain.  He also endorses intermittent dysuria.  He denies any fever, chills, nausea, vomiting, back pain, flank pain, frequency, urgency, difficulty urinating, scrotal swelling, or penile swelling.  Patient is also concerned for low testosterone.  Patient states he has had some difficulty maintaining obtaining erections over the past month as well.  He states this has improved as pain has improved.  He also endorses concerns for infertility.  He has fathered 1 child in the past.  He would like appropriate workup.  Will obtain a semen analysis for infertility checking his soon as possible.  Advised patient to stop by our clinic between the hours of 8 to 10 AM to have his testosterone drawn.  Did advise patient checking testosterone at this time may give false test results.      Past Medical History:  Past Medical History:   Diagnosis Date    Broken leg     Bronchitis     History of ear infections     Viral meningitis        Current Meds:  Current Outpatient Medications   Medication Sig Dispense Refill    doxycycline (VIBRAMYCIN) 100 MG capsule Take one tablet by mouth every 12 hours for 5 days and then one tablet by " mouth nightly after. 20 capsule 0     No current facility-administered medications for this visit.        Allergies:   No Known Allergies     Past Surgical History:  Past Surgical History:   Procedure Laterality Date    FRACTURE SURGERY Right 1994       Social History:  Social History     Socioeconomic History    Marital status: Single   Tobacco Use    Smoking status: Never    Smokeless tobacco: Never   Vaping Use    Vaping Use: Never used   Substance and Sexual Activity    Alcohol use: Yes     Alcohol/week: 1.0 standard drink of alcohol     Types: 1 Cans of beer per week     Comment: not weekly, once a month    Drug use: No    Sexual activity: Not Currently       Family History:  Family History   Problem Relation Age of Onset    Cancer Mother     Hypertension Mother     Cancer Father     Anuerysm Father     Diabetes Brother     Diabetes Maternal Grandfather     Heart disease Maternal Grandfather        Review of Systems:  Review of Systems   Constitutional:  Negative for fatigue, fever and unexpected weight change.   Respiratory:  Negative for chest tightness and shortness of breath.    Cardiovascular:  Negative for chest pain.   Gastrointestinal:  Negative for abdominal pain, constipation, diarrhea, nausea and vomiting.   Genitourinary:  Positive for difficulty urinating, dysuria, penile pain and testicular pain. Negative for flank pain, frequency, penile swelling, scrotal swelling and urgency.   Musculoskeletal:  Negative for back pain.   Skin:  Negative for rash.   Psychiatric/Behavioral:  Negative for confusion and suicidal ideas.        Physical Exam:  Physical Exam  Constitutional:       General: He is not in acute distress.     Appearance: Normal appearance.   HENT:      Head: Normocephalic and atraumatic.      Nose: Nose normal.      Mouth/Throat:      Mouth: Mucous membranes are moist.   Eyes:      Conjunctiva/sclera: Conjunctivae normal.   Cardiovascular:      Rate and Rhythm: Normal rate and regular  rhythm.      Pulses: Normal pulses.      Heart sounds: Normal heart sounds.   Pulmonary:      Effort: Pulmonary effort is normal.      Breath sounds: Normal breath sounds.   Abdominal:      General: Bowel sounds are normal.      Palpations: Abdomen is soft.   Genitourinary:     Comments: Denied prostate exam at this time  Musculoskeletal:         General: Normal range of motion.      Cervical back: Normal range of motion.   Skin:     General: Skin is warm.   Neurological:      General: No focal deficit present.      Mental Status: He is alert and oriented to person, place, and time.   Psychiatric:         Mood and Affect: Mood normal.         Behavior: Behavior normal.         Thought Content: Thought content normal.         Judgment: Judgment normal.           Recent Image (CT and/or KUB):   CT Abdomen and Pelvis: No results found for this or any previous visit.     CT Stone Protocol: Results for orders placed during the hospital encounter of 09/27/18    CT Abdomen Pelvis Stone Protocol    Narrative  CT ABDOMEN PELVIS STONE PROTOCOL-    REASON FOR EXAM: Flank pain, stone disease suspected; N20.0-Calculus of  kidney.    FINDINGS: Spiral scans were obtained through the kidneys, ureter and  bladder. The kidneys were normal in size and shape. There were no  calcifications in the intrarenal collecting systems of the kidneys. The  intrarenal collecting systems were not dilated. The ureters course  normally through the abdomen and pelvis. There were no stones along the  course of the ureters. The urinary bladder was smooth in contour.    Impression  No evidence of stone or obstruction was seen involving the  collecting system of either kidney. A source for the patient's left  flank pain was not identified.    937.14 mGy.cm  The radiation dose reduction device was utilized for each scan per the  ALARA (as low as reasonably achievable) protocol.    This report was finalized on 9/27/2018 9:18 AM by Dr. Fish Coreas  MD HENRIK.     KUB: No results found for this or any previous visit.       Labs:  Brief Urine Lab Results  (Last result in the past 365 days)        Color   Clarity   Blood   Leuk Est   Nitrite   Protein   CREAT   Urine HCG        06/15/23 2335 Dark Yellow   Clear   Negative   Negative   Negative   100 mg/dL (2+)                 No visits with results within 3 Month(s) from this visit.   Latest known visit with results is:   No results displayed because visit has over 200 results.           Procedure: None  Procedures     I have reviewed and agree with the above PMH, PSH, FMH, social history, medications, allergies, and labs.     Assessment/Plan:   Problem List Items Addressed This Visit          Genitourinary and Reproductive     Dysuria     Other Visit Diagnoses       Acute prostatitis    -  Primary    Relevant Medications    doxycycline (VIBRAMYCIN) 100 MG capsule    Fertility testing        Relevant Orders    Semen Analysis - Semen, Voided            Health Maintenance:   Health Maintenance Due   Topic Date Due    ANNUAL PHYSICAL  Never done    TDAP/TD VACCINES (2 - Td or Tdap) 03/08/2023    COVID-19 Vaccine (3 - 2023-24 season) 09/01/2023        Smoking Counseling: Never smoked or use smokeless tobacco    Urine Incontinence: Patient reports that he is not currently experiencing any symptoms of urinary incontinence.    Patient was given instructions and counseling regarding his condition or for health maintenance advice. Please see specific information pulled into the AVS if appropriate.    Patient Education:   Prostatitis -I discussed the pathophysiology of prostatitis.  Discussed the difference between acute versus chronic prostatitis.  I discussed treatment methods which can include conservative versus antimicrobial medications.  Discussed with the patient the use of antibiotics such as sulfa versus fluoroquinolones.  I discussed the risk and benefits of both of these medications.  Advised patient the risks of  tendinitis/tendon rupture with use of fluoroquinolones.  Also discussed the use of warm soaks/compresses twice daily for 10 to 15 minutes.  Given patient's concurrent symptoms of dysuria, penile pain, perineum pain, and low back pain did discuss the use of antimicrobial therapy.  Patient does not wish to start Bactrim at this time.  Also discussed the use of doxycycline.  Discussed this medication in detail including risk and benefits.  Will start the patient on doxycycline 1 tablet by mouth every 12 hours for 5 days and then 1 tablet nightly after.  Patient verbalized understanding.  Fertility testing -patient is concern for infertility at this time.  I discussed the causes of infertility which can include primary versus secondary hypogonadism.  I discussed the workup including lab work as well as semen analysis.  At this time recommending to have a semen analysis completed first before beginning any new lab work.  Will schedule the patient appropriately soon as possible for this and I will call with results once obtained.  Erectile dysfunction -discussed with the patient the pathophysiology of erectile dysfunction.  I explained to the patient that erectile dysfunction is a symptom and no disorder.  I educated the patient that erectile dysfunction is often secondary to significant arterial insufficiency, diabetes mellitus, medications, trauma, low testosterone, or psychological factors.  I discussed with the patient ways to help treat erectile dysfunction which include but are not limited to medications, mechanical devices, penile injections, and penile implants.  I believe patient's erectile dysfunction is most likely secondary to flareup of acute prostatitis.  However patient would like appropriate workup with testosterone lab check.  I did advise patient that if we check testosterone at this however it may lead to false results.  Advised him it is best to check testosterone between the hours of 8 to 10 AM.   Advised patient to stop by our clinic at any time to have blood work drawn.  Otherwise I will see patient back pending test results    Visit Diagnoses:    ICD-10-CM ICD-9-CM   1. Acute prostatitis  N41.0 601.0   2. Fertility testing  Z31.41 V26.21   3. Dysuria  R30.0 788.1     A total of 30 minutes were spent coordinating this patient’s care in clinic today; 15 minutes of which were face-to-face with the patient, reviewing medical history and counseling on the current treatment and followup plan.  All questions were answered to patient's satisfaction.    Meds Ordered During Visit:  New Medications Ordered This Visit   Medications    doxycycline (VIBRAMYCIN) 100 MG capsule     Sig: Take one tablet by mouth every 12 hours for 5 days and then one tablet by mouth nightly after.     Dispense:  20 capsule     Refill:  0       Follow Up Appointment: Pending test results  No follow-ups on file.      This document has been electronically signed by Angel Spring PA-C   January 23, 2024 08:20 EST    Part of this note may be an electronic transcription/translation of spoken language to printed text using the Dragon Dictation System.

## 2024-01-23 ENCOUNTER — TELEPHONE (OUTPATIENT)
Dept: FAMILY MEDICINE CLINIC | Facility: CLINIC | Age: 34
End: 2024-01-23
Payer: COMMERCIAL

## 2024-01-23 NOTE — TELEPHONE ENCOUNTER
I have not evaluated patient for right shoulder pain.  Please have him schedule an appointment to discuss

## 2024-01-23 NOTE — TELEPHONE ENCOUNTER
REINALDO WITH [facility] IS REQUESTING A NEW PT ORDER FOR PATIENT WITH THE ADDED DIAGNOSIS OF PAIN IN RIGHT SHOULDER. KEEP CURRENT DIAGNOSIS OF LEFT FOREARM PAIN.

## 2024-01-24 NOTE — TELEPHONE ENCOUNTER
SPOKE WITH PATIENT AND NOTIFIED HE WOULD NEED TO BE EVALUATED TO GET A REFERRAL, HE VOICED UNDERSTANDING. AND STATES HE WILL CALL OFFICE BACK TO SCHEDULED APPOINTMENT.

## 2024-01-25 ENCOUNTER — LAB (OUTPATIENT)
Dept: LAB | Facility: HOSPITAL | Age: 34
End: 2024-01-25
Payer: COMMERCIAL

## 2024-01-25 DIAGNOSIS — Z31.41 FERTILITY TESTING: ICD-10-CM

## 2024-01-25 LAB
CHARACTER SMN: ABNORMAL
COLOR SMN: ABNORMAL
FORWARD PROGRESSION: ABNORMAL
ROUND CELLS, SEMINAL FLUID: ABNORMAL /HPF
SPECIMEN VOL SMN: 4 ML (ref 2–5)
SPERM # SMN: 14.8 MILLIONS/ML (ref 20–999)
SPERM MOTILE NFR SMN: 49 % MOTILE (ref 41–100)
SPERM PRECURSORS/100 SPERM: 1 /100 SPERM
SPERM SMN: 26 % NORMAL (ref 30–100)
VIABLE CELLS NFR SPEC: ABNORMAL %
VISC SMN: ABNORMAL CP
WBC/100 SPERM: 3 /100 SPERM

## 2024-01-25 PROCEDURE — 89320 SEMEN ANAL VOL/COUNT/MOT: CPT

## 2024-01-26 ENCOUNTER — TELEPHONE (OUTPATIENT)
Dept: UROLOGY | Facility: CLINIC | Age: 34
End: 2024-01-26
Payer: COMMERCIAL

## 2024-01-26 NOTE — TELEPHONE ENCOUNTER
Called patient to discuss semen analysis results.  Advised him that semen total sperm count was slightly low at 14 million and morphology was slightly low at 26%.  Recommend to increase fiber and fruits and vegetables.  Also recommended exercise and increase water intake.  Advised patient he can stop by the clinic and have his testosterone, LH, FSH, and estradiol checked.  He verbalized understanding.

## 2024-03-22 ENCOUNTER — OFFICE VISIT (OUTPATIENT)
Dept: UROLOGY | Facility: CLINIC | Age: 34
End: 2024-03-22
Payer: COMMERCIAL

## 2024-03-22 VITALS
SYSTOLIC BLOOD PRESSURE: 134 MMHG | DIASTOLIC BLOOD PRESSURE: 69 MMHG | BODY MASS INDEX: 35.16 KG/M2 | HEIGHT: 69 IN | HEART RATE: 83 BPM | WEIGHT: 237.4 LBS

## 2024-03-22 DIAGNOSIS — N52.8 OTHER MALE ERECTILE DYSFUNCTION: ICD-10-CM

## 2024-03-22 DIAGNOSIS — N48.1 BALANITIS: Primary | ICD-10-CM

## 2024-03-22 DIAGNOSIS — Z31.41 FERTILITY TESTING: ICD-10-CM

## 2024-03-22 DIAGNOSIS — R86.9 ABNORMAL SEMEN ANALYSIS: ICD-10-CM

## 2024-03-22 LAB
ESTRADIOL SERPL HS-MCNC: 32.9 PG/ML
FSH SERPL-ACNC: 2.04 MIU/ML
LH SERPL-ACNC: 4.28 MIU/ML
TESTOST SERPL-MCNC: 385 NG/DL (ref 249–836)

## 2024-03-22 PROCEDURE — 84403 ASSAY OF TOTAL TESTOSTERONE: CPT

## 2024-03-22 PROCEDURE — 83002 ASSAY OF GONADOTROPIN (LH): CPT

## 2024-03-22 PROCEDURE — 83001 ASSAY OF GONADOTROPIN (FSH): CPT

## 2024-03-22 PROCEDURE — 82670 ASSAY OF TOTAL ESTRADIOL: CPT

## 2024-03-22 RX ORDER — CLOTRIMAZOLE AND BETAMETHASONE DIPROPIONATE 10; .64 MG/G; MG/G
1 CREAM TOPICAL 2 TIMES DAILY
Qty: 45 G | Refills: 2 | Status: SHIPPED | OUTPATIENT
Start: 2024-03-22

## 2024-03-22 NOTE — PROGRESS NOTES
"Chief Complaint:    Chief Complaint   Patient presents with    Follow-up     Rash on penis         Vital Signs:   /69   Pulse 83   Ht 175.3 cm (69.02\")   Wt 108 kg (237 lb 6.4 oz)   BMI 35.04 kg/m²   Body mass index is 35.04 kg/m².      HPI:  Rupert Bean is a 33 y.o. male who presents today for follow up    History of Present Illness  Mr. Bean presents to the clinic for follow-up and concerns of rash on penis.  Has been seen previously for right testicular pain and acute prostatitis.  He was last seen in January 2024 was concerned about fertility issues at that time.  Semen analysis completed that showed a low total sperm count roughly 14 million.  His viscosity was monitored, motility and forward progression were normal, and morphology was slightly decreased at roughly 26%.  States that over the past 3 weeks he has noticed an intermittent rash on his penis.  He reports this has improved.  He denies any new sexual partners.  He denies any penile discharge, dysuria, difficulty urinating, testicular pain, hesitancy, gross hematuria, or fever/chills.  Physical exam today he has some notable balanitis around the glans and on skin of the shaft of the penis.  He does wish to proceed forward with testing in office today for concerns of fertility issues.      Past Medical History:  Past Medical History:   Diagnosis Date    Broken leg     Bronchitis     History of ear infections     Viral meningitis        Current Meds:  Current Outpatient Medications   Medication Sig Dispense Refill    clotrimazole-betamethasone (Lotrisone) 1-0.05 % cream Apply 1 Application topically to the appropriate area as directed 2 (Two) Times a Day. 45 g 2    doxycycline (VIBRAMYCIN) 100 MG capsule Take one tablet by mouth every 12 hours for 5 days and then one tablet by mouth nightly after. (Patient not taking: Reported on 3/22/2024) 20 capsule 0     No current facility-administered medications for this visit.        Allergies: "   No Known Allergies     Past Surgical History:  Past Surgical History:   Procedure Laterality Date    FRACTURE SURGERY Right 1994       Social History:  Social History     Socioeconomic History    Marital status: Single   Tobacco Use    Smoking status: Never    Smokeless tobacco: Never   Vaping Use    Vaping status: Never Used   Substance and Sexual Activity    Alcohol use: Yes     Alcohol/week: 1.0 standard drink of alcohol     Types: 1 Cans of beer per week     Comment: not weekly, once a month    Drug use: No    Sexual activity: Not Currently       Family History:  Family History   Problem Relation Age of Onset    Cancer Mother     Hypertension Mother     Cancer Father     Anuerysm Father     Diabetes Brother     Diabetes Maternal Grandfather     Heart disease Maternal Grandfather        Review of Systems:  Review of Systems   Constitutional:  Negative for fatigue, fever and unexpected weight change.   Respiratory:  Negative for chest tightness and shortness of breath.    Cardiovascular:  Negative for chest pain.   Gastrointestinal:  Negative for abdominal pain, constipation, diarrhea, nausea and vomiting.   Genitourinary:  Positive for penile pain. Negative for difficulty urinating, dysuria, frequency and urgency.   Skin:  Negative for rash.   Psychiatric/Behavioral:  Negative for confusion and suicidal ideas.        Physical Exam:  Physical Exam  Constitutional:       General: He is not in acute distress.     Appearance: Normal appearance.   HENT:      Head: Normocephalic and atraumatic.      Nose: Nose normal.      Mouth/Throat:      Mouth: Mucous membranes are moist.   Eyes:      Conjunctiva/sclera: Conjunctivae normal.   Cardiovascular:      Rate and Rhythm: Normal rate and regular rhythm.      Pulses: Normal pulses.      Heart sounds: Normal heart sounds.   Pulmonary:      Effort: Pulmonary effort is normal.      Breath sounds: Normal breath sounds.   Abdominal:      General: Bowel sounds are normal.       Palpations: Abdomen is soft.   Genitourinary:     Comments: Notable balanitis with normal testicles bilaterally.  Musculoskeletal:         General: Normal range of motion.      Cervical back: Normal range of motion.   Skin:     General: Skin is warm.   Neurological:      General: No focal deficit present.      Mental Status: He is alert and oriented to person, place, and time.   Psychiatric:         Mood and Affect: Mood normal.         Behavior: Behavior normal.         Thought Content: Thought content normal.         Judgment: Judgment normal.         Recent Image (CT and/or KUB):   CT Abdomen and Pelvis: No results found for this or any previous visit.     CT Stone Protocol: Results for orders placed during the hospital encounter of 09/27/18    CT Abdomen Pelvis Stone Protocol    Narrative  CT ABDOMEN PELVIS STONE PROTOCOL-    REASON FOR EXAM: Flank pain, stone disease suspected; N20.0-Calculus of  kidney.    FINDINGS: Spiral scans were obtained through the kidneys, ureter and  bladder. The kidneys were normal in size and shape. There were no  calcifications in the intrarenal collecting systems of the kidneys. The  intrarenal collecting systems were not dilated. The ureters course  normally through the abdomen and pelvis. There were no stones along the  course of the ureters. The urinary bladder was smooth in contour.    Impression  No evidence of stone or obstruction was seen involving the  collecting system of either kidney. A source for the patient's left  flank pain was not identified.    937.14 mGy.cm  The radiation dose reduction device was utilized for each scan per the  ALARA (as low as reasonably achievable) protocol.    This report was finalized on 9/27/2018 9:18 AM by Dr. Fish Coreas II, MD.     KUB: No results found for this or any previous visit.       Labs:  Brief Urine Lab Results  (Last result in the past 365 days)        Color   Clarity   Blood   Leuk Est   Nitrite   Protein   CREAT   Urine  HCG        06/15/23 2335 Dark Yellow   Clear   Negative   Negative   Negative   100 mg/dL (2+)                 Lab on 01/25/2024   Component Date Value Ref Range Status    Semen Color 01/25/2024 Gray  White, Felipe Final    Volume, Semen 01/25/2024 4.0  2.0 - 5.0 mL Final    Viscosity 01/25/2024 Minor (A)  Normal Final    Motility 01/25/2024 49  41 - 100 % Motile Final    Forward Progression 01/25/2024 2.5 (Normal) The majority of the motile sperm have good, normal rapid forward progression.   Final    Viability 01/25/2024    Final    Not performed per protocol.        Sperm Morphology 01/25/2024 26.0 (L)  30.0 - 100.0 % Normal Final    Sperm, Total Count 01/25/2024 14.8 (L)  20.0 - 999.0 Millions/mL Final    Round Cells, Seminal Fluid 01/25/2024 0-7  0 - 7 /HPF Final    WBC/100 sperm 01/25/2024 3  /100 Sperm Final    Sperm Precursors/100 sperm 01/25/2024 1  /100 Sperm Final    Character, Semen 01/25/2024 Translucent but turbid  Translucent but turbid Final        Procedure: None  Procedures     I have reviewed and agree with the above PMH, PSH, FMH, social history, medications, allergies, and labs.     Assessment/Plan:   Problem List Items Addressed This Visit          Genitourinary and Reproductive     Balanitis - Primary    Relevant Medications    clotrimazole-betamethasone (Lotrisone) 1-0.05 % cream    Abnormal semen analysis     Other Visit Diagnoses       Fertility testing        Relevant Orders    Luteinizing Hormone    Follicle Stimulating Hormone    Estradiol    Testosterone    Other male erectile dysfunction        Relevant Orders    Testosterone            Health Maintenance:   Health Maintenance Due   Topic Date Due    ANNUAL PHYSICAL  Never done    TDAP/TD VACCINES (2 - Td or Tdap) 03/08/2023    COVID-19 Vaccine (3 - 2023-24 season) 09/01/2023        Smoking Counseling: Never smoked use smokeless tobacco    Urine Incontinence: Patient reports that he is not currently experiencing any symptoms of urinary  incontinence.    Patient was given instructions and counseling regarding his condition or for health maintenance advice. Please see specific information pulled into the AVS if appropriate.    Patient Education:   Balanitis -discussed with the patient the pathophysiology of this condition in detail.  Discussed this is caused by yeast that was often found in dark damp areas.  Advised patient risk factors which can include improper hygiene.  Recommend to clean area with warm soap and water twice daily.  Advised patient to keep area dry.  I will start the patient on a trial of Lotrisone cream once every 12 hours.  Advised patient to clean with warm soap and water dry area and apply cream.  Advised patient if symptoms do not improve in 2 weeks to call and we will change medications.  Patient verbalized understanding.  Abnormal semen analysis/infertility testing -patient had a semen analysis completed roughly 1 month ago that showed overall low sperm count roughly 14 million with a decreased morphology.  Did discuss conservative methods to treat this such as increasing fiber and fruits and vegetables daily.  Also discussed the use of Mucinex over-the-counter to help with viscosity and motility issues.  This time recommend further workup including an LH, FSH, testosterone, and estradiol.  Discussed the pathophysiology of these test in detail.  Discussed secondary versus primary hypogonadism with the patient.  I will call patient with blood test results once available.  Did discuss the use of Clomid and off label setting.  Discussed the clinical indications for this as well.  Patient verbalized understanding.  Erectile dysfunction -given patient's intermittent problems with erectile dysfunction we will also check a testosterone in office today.  Otherwise I will see the patient back in 3 months for reevaluation    Visit Diagnoses:    ICD-10-CM ICD-9-CM   1. Balanitis  N48.1 607.1   2. Fertility testing  Z31.41 V26.21   3.  Other male erectile dysfunction  N52.8 607.84   4. Abnormal semen analysis  R86.9 792.2       Meds Ordered During Visit:  New Medications Ordered This Visit   Medications    clotrimazole-betamethasone (Lotrisone) 1-0.05 % cream     Sig: Apply 1 Application topically to the appropriate area as directed 2 (Two) Times a Day.     Dispense:  45 g     Refill:  2       Follow Up Appointment: 3 months pending blood tests.  No follow-ups on file.      This document has been electronically signed by Angel Spring PA-C   March 22, 2024 10:20 EDT    Part of this note may be an electronic transcription/translation of spoken language to printed text using the Dragon Dictation System.

## 2024-03-25 ENCOUNTER — TELEPHONE (OUTPATIENT)
Dept: UROLOGY | Facility: CLINIC | Age: 34
End: 2024-03-25
Payer: COMMERCIAL

## 2024-03-25 NOTE — TELEPHONE ENCOUNTER
Called patient to advise him lab results came back all within normal range.  Advised testosterone was slightly on the lower end of scale however still normal at 385.  Recommend to continue with diet and exercise.  He verbalized understanding.

## 2024-07-19 ENCOUNTER — OFFICE VISIT (OUTPATIENT)
Dept: ORTHOPEDIC SURGERY | Facility: CLINIC | Age: 34
End: 2024-07-19
Payer: COMMERCIAL

## 2024-07-19 ENCOUNTER — HOSPITAL ENCOUNTER (OUTPATIENT)
Dept: GENERAL RADIOLOGY | Facility: HOSPITAL | Age: 34
Discharge: HOME | End: 2024-07-19
Admitting: PHYSICIAN ASSISTANT
Payer: COMMERCIAL

## 2024-07-19 DIAGNOSIS — M25.531 RIGHT WRIST PAIN: ICD-10-CM

## 2024-07-19 DIAGNOSIS — M25.531 RIGHT WRIST PAIN: Primary | ICD-10-CM

## 2024-07-19 PROCEDURE — 73110 X-RAY EXAM OF WRIST: CPT

## 2024-07-19 PROCEDURE — 99213 OFFICE O/P EST LOW 20 MIN: CPT | Performed by: PHYSICIAN ASSISTANT

## 2024-07-22 VITALS — WEIGHT: 237 LBS | BODY MASS INDEX: 35.1 KG/M2 | HEIGHT: 69 IN

## 2024-08-02 NOTE — PROGRESS NOTES
[facility] [place] Patient Visit          Patient: Rupert Bean  YOB: 1990  Date of Encounter: 7/19/2024  PCP: Carol Boswell DO      Subjective     Chief Complaint   Patient presents with    Right Wrist - Pain     New-Problem           History of Present Illness:     Rupert Bean is a 34 y.o. male presents for follow-up evaluation new complaint right wrist injury as result of performing jujitsu approximately 6 weeks ago in which he twisted his right wrist and Samp the patient has had pain to the ulnar aspect of the wrist following this.  He reports popping at times as well as pain with repetitive motion and activity.  Patient declines any significant ecchymosis or erythema.  Patient presents today for radiographic review and further evaluation.  Patient denies any current paresthesias.           Patient Active Problem List   Diagnosis    DALLIN on CPAP    Health care maintenance    Scrotal pain    Dysuria    Lesion of skin of nose    History of prostatitis    Balanitis    Abnormal semen analysis     Past Medical History:   Diagnosis Date    Broken leg     Bronchitis     History of ear infections     Viral meningitis      Past Surgical History:   Procedure Laterality Date    FRACTURE SURGERY Right 1994     Social History     Occupational History    Not on file   Tobacco Use    Smoking status: Never    Smokeless tobacco: Never   Vaping Use    Vaping status: Never Used   Substance and Sexual Activity    Alcohol use: Yes     Alcohol/week: 1.0 standard drink of alcohol     Types: 1 Cans of beer per week     Comment: not weekly, once a month    Drug use: No    Sexual activity: Not Currently    Rupert Bean  reports that he has never smoked. He has never used smokeless tobacco.. I have educated him on the risk of diseases from using tobacco products such as cancer, COPD and heart disease.        Social History     Social History Narrative    Not on file     Family History  "  Problem Relation Age of Onset    Cancer Mother     Hypertension Mother     Cancer Father     Anuerysm Father     Diabetes Brother     Diabetes Maternal Grandfather     Heart disease Maternal Grandfather      Current Outpatient Medications   Medication Sig Dispense Refill    clotrimazole-betamethasone (Lotrisone) 1-0.05 % cream Apply 1 Application topically to the appropriate area as directed 2 (Two) Times a Day. 45 g 2     No current facility-administered medications for this visit.     No Known Allergies         Review of Systems   Constitutional: Negative.   HENT: Negative.     Eyes: Negative.    Cardiovascular:  Positive for leg swelling.   Respiratory: Negative.     Endocrine: Negative.    Hematologic/Lymphatic: Negative.    Skin: Negative.    Musculoskeletal:         Pertinent positives listed in HPI   Gastrointestinal: Negative.    Genitourinary: Negative.    Neurological: Negative.    Psychiatric/Behavioral: Negative.     Allergic/Immunologic: Negative.          Objective      Vitals:    07/22/24 0811   Weight: 108 kg (237 lb)   Height: 175.3 cm (69\")      Class 2 Severe Obesity (BMI >=35 and <=39.9). Obesity-related health conditions include the following:  listed in PMH . Obesity is newly identified. BMI is is above average; BMI management plan is completed. We discussed portion control and increasing exercise.      Physical Exam  Vitals and nursing note reviewed.   Constitutional:       General: He is not in acute distress.     Appearance: Normal appearance. He is not ill-appearing.   HENT:      Head: Normocephalic and atraumatic.      Right Ear: External ear normal.      Left Ear: External ear normal.      Nose: Nose normal.      Mouth/Throat:      Mouth: Mucous membranes are moist.      Pharynx: Oropharynx is clear.   Eyes:      Extraocular Movements: Extraocular movements intact.      Conjunctiva/sclera: Conjunctivae normal.      Pupils: Pupils are equal, round, and reactive to light. "   Cardiovascular:      Rate and Rhythm: Normal rate.      Pulses: Normal pulses.   Pulmonary:      Effort: Pulmonary effort is normal.   Abdominal:      General: There is no distension.   Musculoskeletal:      Cervical back: Normal range of motion. No rigidity.      Comments: Examination today of the patient's right wrist reveals there is mild ulnar wrist pain worse upon full wrist extension and flexion.  Patient does have pain with ulnar deviation with strength intact.  Full intact pronation supination.  Direct point tender along the TFCC and ulnar wrist.  No significant instability.  Neurovascular status grossly intact right upper extremity   Skin:     General: Skin is warm and dry.      Capillary Refill: Capillary refill takes less than 2 seconds.   Neurological:      General: No focal deficit present.      Mental Status: He is alert and oriented to person, place, and time.   Psychiatric:         Mood and Affect: Mood normal.         Behavior: Behavior normal.             Radiology:      XR Wrist 3+ View Right    Result Date: 7/21/2024    Lucency in the distal radius laterally. I cannot exclude nondisplaced fracture but I do recommend clinical correlation for point tenderness.   This report was finalized on 7/21/2024 3:10 PM by Dr. Bandar Mercado MD.                 Assessment/Plan        ICD-10-CM ICD-9-CM   1. Right wrist pain  M25.531 719.43     34-year-old male with notable right wrist pain approximate 6-week history in which the patient was performing [employer] and suffered a right ulnar wrist injury.  The patient had questionable concern for ulnar wrist strain and radiographic evidence revealing lucency in the distal radius laterally.  This does not specifically correlate with his pain profile and symptoms.  As result the patient was given a prescription for Naprosyn 500 mg 1 p.o. twice daily dispense #60 with no refills.  Patient will also undergo cock-up wrist bracing over the course the next 2 weeks.   Patient will return back upon completion of this for further evaluation and repeat radiographs.          This document was signed by Wilmer Starkey PA-C July 19, 2024    CC: Carol Boswell DO      Dictated Utilizing Dragon Dictation:   Please note that portions of this note were completed with a voice recognition program.   Part of this note may be an electronic transcription/translation of spoken language to printed text using the Dragon Dictation System.

## 2024-08-16 ENCOUNTER — OFFICE VISIT (OUTPATIENT)
Dept: ORTHOPEDIC SURGERY | Facility: CLINIC | Age: 34
End: 2024-08-16
Payer: COMMERCIAL

## 2024-08-16 VITALS — WEIGHT: 237 LBS | HEIGHT: 69 IN | BODY MASS INDEX: 35.1 KG/M2

## 2024-08-16 DIAGNOSIS — M25.531 RIGHT WRIST PAIN: Primary | ICD-10-CM

## 2024-08-16 PROCEDURE — 99213 OFFICE O/P EST LOW 20 MIN: CPT | Performed by: PHYSICIAN ASSISTANT

## 2024-08-16 NOTE — PROGRESS NOTES
[facility] [place] [facility] Established Patient Visit          Patient: Rupert Bean  YOB: 1990  Date of Encounter: 8/16/2024  PCP: Carol Boswell DO      Subjective     Chief Complaint   Patient presents with    Right Wrist - Pain, Follow-up           History of Present Illness:     Rupert Bean is a 34 y.o. male presents for follow-up evaluation follow complaint right wrist injury as result of performing jujitsu approximately 9 weeks ago in which he twisted his right wrist and hand  the patient has had pain to the ulnar aspect of the wrist following this. He reports popping at times as well as pain with repetitive motion and activity.  Patient reports he has tolerated the bracing and hands over the last week and is slowly progressed with range of motion activity.  With pushing motions he seems to be  doing well however with supination and pulling he still seems to have some mild pain.         Patient Active Problem List   Diagnosis    DALLIN on CPAP    Health care maintenance    Scrotal pain    Dysuria    Lesion of skin of nose    History of prostatitis    Balanitis    Abnormal semen analysis     Past Medical History:   Diagnosis Date    Broken leg     Bronchitis     History of ear infections     Viral meningitis      Past Surgical History:   Procedure Laterality Date    FRACTURE SURGERY Right 1994     Social History     Occupational History    Not on file   Tobacco Use    Smoking status: Never    Smokeless tobacco: Never   Vaping Use    Vaping status: Never Used   Substance and Sexual Activity    Alcohol use: Yes     Alcohol/week: 1.0 standard drink of alcohol     Types: 1 Cans of beer per week     Comment: not weekly, once a month    Drug use: No    Sexual activity: Not Currently    Rupert Bean  reports that he has never smoked. He has never used smokeless tobacco.. I have educated him on the risk of diseases from using tobacco products such as cancer, COPD and heart disease.       "  Social History     Social History Narrative    Not on file     Family History   Problem Relation Age of Onset    Cancer Mother     Hypertension Mother     Cancer Father     Anuerysm Father     Diabetes Brother     Diabetes Maternal Grandfather     Heart disease Maternal Grandfather      Current Outpatient Medications   Medication Sig Dispense Refill    clotrimazole-betamethasone (Lotrisone) 1-0.05 % cream Apply 1 Application topically to the appropriate area as directed 2 (Two) Times a Day. (Patient not taking: Reported on 8/16/2024) 45 g 2     No current facility-administered medications for this visit.     No Known Allergies         Review of Systems   Constitutional: Negative.   HENT: Negative.     Eyes: Negative.    Cardiovascular:  Positive for leg swelling.   Respiratory: Negative.     Endocrine: Negative.    Hematologic/Lymphatic: Negative.    Skin: Negative.    Musculoskeletal:         Pertinent positives listed in HPI   Gastrointestinal: Negative.    Genitourinary: Negative.    Neurological: Negative.    Psychiatric/Behavioral: Negative.     Allergic/Immunologic: Negative.          Objective      Vitals:    08/16/24 0847   Weight: 108 kg (237 lb)   Height: 175.3 cm (69.02\")      Class 2 Severe Obesity (BMI >=35 and <=39.9). Obesity-related health conditions include the following:  listed in PMH . Obesity is newly identified. BMI is is above average; BMI management plan is completed. We discussed portion control and increasing exercise.      Physical Exam  Vitals and nursing note reviewed.   Constitutional:       General: He is not in acute distress.     Appearance: Normal appearance. He is not ill-appearing.   HENT:      Head: Normocephalic and atraumatic.      Right Ear: External ear normal.      Left Ear: External ear normal.      Nose: Nose normal.      Mouth/Throat:      Mouth: Mucous membranes are moist.      Pharynx: Oropharynx is clear.   Eyes:      Extraocular Movements: Extraocular movements " intact.      Conjunctiva/sclera: Conjunctivae normal.      Pupils: Pupils are equal, round, and reactive to light.   Cardiovascular:      Rate and Rhythm: Normal rate.      Pulses: Normal pulses.   Pulmonary:      Effort: Pulmonary effort is normal.   Abdominal:      General: There is no distension.   Musculoskeletal:      Cervical back: Normal range of motion. No rigidity.      Comments: Examination today of the patient's right wrist reveals there is mild ulnar wrist pain worse upon full wrist extension and flexion.  Patient does have pain with ulnar deviation with strength intact.  Full intact pronation supination.  Direct point tender along the TFCC and ulnar wrist.  No significant instability.  Neurovascular status grossly intact right upper extremity   Skin:     General: Skin is warm and dry.      Capillary Refill: Capillary refill takes less than 2 seconds.   Neurological:      General: No focal deficit present.      Mental Status: He is alert and oriented to person, place, and time.   Psychiatric:         Mood and Affect: Mood normal.         Behavior: Behavior normal.           Radiology:      XR Wrist 3+ View Right    Result Date: 7/21/2024    Lucency in the distal radius laterally. I cannot exclude nondisplaced fracture but I do recommend clinical correlation for point tenderness.   This report was finalized on 7/21/2024 3:10 PM by Dr. Bandar Mercado MD.                 Assessment/Plan        ICD-10-CM ICD-9-CM   1. Right wrist pain  M25.531 719.43       34-year-old male with notable right wrist pain approximate 9 week history in which the patient was performing [employer] and suffered a right ulnar wrist injury.  The patient had questionable concern for ulnar wrist strain and radiographic evidence revealing lucency in the distal radius laterally.  This does not specifically correlate with his pain profile and symptoms.  Patient has been tolerant of the Naprosyn which she will slowly begin to wean from this as  pain and swelling are his guide.  The patient will also discontinue the cock-up wrist brace and return back in his normal activities with activity modification and avoidance of aggravating factors over the course of the next 2 weeks. Follow up as needed.          This document was signed by Wilmer Starkey PA-C August 16, 2024    CC: Carol Boswell DO      Dictated Utilizing Dragon Dictation:   Please note that portions of this note were completed with a voice recognition program.   Part of this note may be an electronic transcription/translation of spoken language to printed text using the Dragon Dictation System.